# Patient Record
Sex: FEMALE | Race: OTHER | NOT HISPANIC OR LATINO | URBAN - METROPOLITAN AREA
[De-identification: names, ages, dates, MRNs, and addresses within clinical notes are randomized per-mention and may not be internally consistent; named-entity substitution may affect disease eponyms.]

---

## 2017-01-01 ENCOUNTER — INPATIENT (INPATIENT)
Facility: HOSPITAL | Age: 82
LOS: 6 days | Discharge: NURSING, REHAB OR LONG-TERM CARE | DRG: 482 | End: 2017-11-13
Attending: INTERNAL MEDICINE | Admitting: INTERNAL MEDICINE
Payer: MEDICARE

## 2017-01-01 ENCOUNTER — INPATIENT (INPATIENT)
Facility: HOSPITAL | Age: 82
LOS: 12 days | DRG: 870 | End: 2017-12-11
Attending: INTERNAL MEDICINE | Admitting: INTERNAL MEDICINE
Payer: MEDICARE

## 2017-01-01 VITALS
TEMPERATURE: 97 F | HEART RATE: 84 BPM | OXYGEN SATURATION: 99 % | SYSTOLIC BLOOD PRESSURE: 136 MMHG | RESPIRATION RATE: 16 BRPM | DIASTOLIC BLOOD PRESSURE: 61 MMHG

## 2017-01-01 VITALS
DIASTOLIC BLOOD PRESSURE: 73 MMHG | RESPIRATION RATE: 26 BRPM | OXYGEN SATURATION: 94 % | HEART RATE: 130 BPM | SYSTOLIC BLOOD PRESSURE: 126 MMHG | TEMPERATURE: 100 F

## 2017-01-01 VITALS
HEART RATE: 66 BPM | HEIGHT: 63 IN | TEMPERATURE: 98 F | OXYGEN SATURATION: 99 % | RESPIRATION RATE: 18 BRPM | WEIGHT: 130.07 LBS | DIASTOLIC BLOOD PRESSURE: 73 MMHG | SYSTOLIC BLOOD PRESSURE: 182 MMHG

## 2017-01-01 DIAGNOSIS — A41.9 SEPSIS, UNSPECIFIED ORGANISM: ICD-10-CM

## 2017-01-01 DIAGNOSIS — R19.7 DIARRHEA, UNSPECIFIED: ICD-10-CM

## 2017-01-01 DIAGNOSIS — M25.551 PAIN IN RIGHT HIP: ICD-10-CM

## 2017-01-01 DIAGNOSIS — R53.81 OTHER MALAISE: ICD-10-CM

## 2017-01-01 DIAGNOSIS — E05.90 THYROTOXICOSIS, UNSPECIFIED WITHOUT THYROTOXIC CRISIS OR STORM: ICD-10-CM

## 2017-01-01 DIAGNOSIS — Z29.9 ENCOUNTER FOR PROPHYLACTIC MEASURES, UNSPECIFIED: ICD-10-CM

## 2017-01-01 DIAGNOSIS — R94.31 ABNORMAL ELECTROCARDIOGRAM [ECG] [EKG]: ICD-10-CM

## 2017-01-01 DIAGNOSIS — L40.9 PSORIASIS, UNSPECIFIED: ICD-10-CM

## 2017-01-01 DIAGNOSIS — S72.001A FRACTURE OF UNSPECIFIED PART OF NECK OF RIGHT FEMUR, INITIAL ENCOUNTER FOR CLOSED FRACTURE: ICD-10-CM

## 2017-01-01 DIAGNOSIS — F03.90 UNSPECIFIED DEMENTIA WITHOUT BEHAVIORAL DISTURBANCE: ICD-10-CM

## 2017-01-01 DIAGNOSIS — G89.18 OTHER ACUTE POSTPROCEDURAL PAIN: ICD-10-CM

## 2017-01-01 DIAGNOSIS — Z51.5 ENCOUNTER FOR PALLIATIVE CARE: ICD-10-CM

## 2017-01-01 DIAGNOSIS — I50.21 ACUTE SYSTOLIC (CONGESTIVE) HEART FAILURE: ICD-10-CM

## 2017-01-01 DIAGNOSIS — E87.0 HYPEROSMOLALITY AND HYPERNATREMIA: ICD-10-CM

## 2017-01-01 DIAGNOSIS — J96.90 RESPIRATORY FAILURE, UNSPECIFIED, UNSPECIFIED WHETHER WITH HYPOXIA OR HYPERCAPNIA: ICD-10-CM

## 2017-01-01 DIAGNOSIS — G30.1 ALZHEIMER'S DISEASE WITH LATE ONSET: ICD-10-CM

## 2017-01-01 DIAGNOSIS — J96.01 ACUTE RESPIRATORY FAILURE WITH HYPOXIA: ICD-10-CM

## 2017-01-01 DIAGNOSIS — D72.829 ELEVATED WHITE BLOOD CELL COUNT, UNSPECIFIED: ICD-10-CM

## 2017-01-01 LAB
-  AMPICILLIN: SIGNIFICANT CHANGE UP
-  CEFTRIAXONE: SIGNIFICANT CHANGE UP
-  CHLORAMPHENICOL: SIGNIFICANT CHANGE UP
-  LEVOFLOXACIN: SIGNIFICANT CHANGE UP
-  MEROPENEM: SIGNIFICANT CHANGE UP
24R-OH-CALCIDIOL SERPL-MCNC: 14.5 NG/ML — LOW (ref 30–80)
ABO RH CONFIRMATION: SIGNIFICANT CHANGE UP
ALBUMIN SERPL ELPH-MCNC: 1.7 G/DL — LOW (ref 3.5–5)
ALBUMIN SERPL ELPH-MCNC: 2.5 G/DL — LOW (ref 3.5–5)
ALBUMIN SERPL ELPH-MCNC: 2.6 G/DL — LOW (ref 3.5–5)
ALBUMIN SERPL ELPH-MCNC: 2.7 G/DL — LOW (ref 3.5–5)
ALBUMIN SERPL ELPH-MCNC: 3 G/DL — LOW (ref 3.5–5)
ALP SERPL-CCNC: 119 U/L — SIGNIFICANT CHANGE UP (ref 40–120)
ALP SERPL-CCNC: 119 U/L — SIGNIFICANT CHANGE UP (ref 40–120)
ALP SERPL-CCNC: 65 U/L — SIGNIFICANT CHANGE UP (ref 40–120)
ALP SERPL-CCNC: 66 U/L — SIGNIFICANT CHANGE UP (ref 40–120)
ALP SERPL-CCNC: 75 U/L — SIGNIFICANT CHANGE UP (ref 40–120)
ALT FLD-CCNC: 18 U/L DA — SIGNIFICANT CHANGE UP (ref 10–60)
ALT FLD-CCNC: 19 U/L DA — SIGNIFICANT CHANGE UP (ref 10–60)
ALT FLD-CCNC: 20 U/L DA — SIGNIFICANT CHANGE UP (ref 10–60)
ALT FLD-CCNC: 24 U/L DA — SIGNIFICANT CHANGE UP (ref 10–60)
ALT FLD-CCNC: 29 U/L DA — SIGNIFICANT CHANGE UP (ref 10–60)
ANION GAP SERPL CALC-SCNC: 10 MMOL/L — SIGNIFICANT CHANGE UP (ref 5–17)
ANION GAP SERPL CALC-SCNC: 10 MMOL/L — SIGNIFICANT CHANGE UP (ref 5–17)
ANION GAP SERPL CALC-SCNC: 15 MMOL/L — SIGNIFICANT CHANGE UP (ref 5–17)
ANION GAP SERPL CALC-SCNC: 2 MMOL/L — LOW (ref 5–17)
ANION GAP SERPL CALC-SCNC: 3 MMOL/L — LOW (ref 5–17)
ANION GAP SERPL CALC-SCNC: 4 MMOL/L — LOW (ref 5–17)
ANION GAP SERPL CALC-SCNC: 4 MMOL/L — LOW (ref 5–17)
ANION GAP SERPL CALC-SCNC: 5 MMOL/L — SIGNIFICANT CHANGE UP (ref 5–17)
ANION GAP SERPL CALC-SCNC: 6 MMOL/L — SIGNIFICANT CHANGE UP (ref 5–17)
ANION GAP SERPL CALC-SCNC: 7 MMOL/L — SIGNIFICANT CHANGE UP (ref 5–17)
ANION GAP SERPL CALC-SCNC: 8 MMOL/L — SIGNIFICANT CHANGE UP (ref 5–17)
APPEARANCE UR: ABNORMAL
APTT BLD: 30.9 SEC — SIGNIFICANT CHANGE UP (ref 27.5–37.4)
APTT BLD: 41.2 SEC — HIGH (ref 27.5–37.4)
AST SERPL-CCNC: 13 U/L — SIGNIFICANT CHANGE UP (ref 10–40)
AST SERPL-CCNC: 22 U/L — SIGNIFICANT CHANGE UP (ref 10–40)
AST SERPL-CCNC: 24 U/L — SIGNIFICANT CHANGE UP (ref 10–40)
AST SERPL-CCNC: 24 U/L — SIGNIFICANT CHANGE UP (ref 10–40)
AST SERPL-CCNC: 39 U/L — SIGNIFICANT CHANGE UP (ref 10–40)
B PERT IGG+IGM PNL SER: ABNORMAL
BASE EXCESS BLDA CALC-SCNC: -0.3 MMOL/L — SIGNIFICANT CHANGE UP (ref -2–2)
BASE EXCESS BLDA CALC-SCNC: -1.5 MMOL/L — SIGNIFICANT CHANGE UP (ref -2–2)
BASE EXCESS BLDA CALC-SCNC: -2.4 MMOL/L — LOW (ref -2–2)
BASE EXCESS BLDA CALC-SCNC: 0.4 MMOL/L — SIGNIFICANT CHANGE UP (ref -2–2)
BASE EXCESS BLDA CALC-SCNC: 1.7 MMOL/L — SIGNIFICANT CHANGE UP (ref -2–2)
BASE EXCESS BLDA CALC-SCNC: 12.9 MMOL/L — HIGH (ref -2–2)
BASE EXCESS BLDA CALC-SCNC: 13 MMOL/L — HIGH (ref -2–2)
BASE EXCESS BLDA CALC-SCNC: 13.5 MMOL/L — HIGH (ref -2–2)
BASE EXCESS BLDA CALC-SCNC: 4.7 MMOL/L — HIGH (ref -2–2)
BASE EXCESS BLDA CALC-SCNC: 5.8 MMOL/L — HIGH (ref -2–2)
BASE EXCESS BLDA CALC-SCNC: 6 MMOL/L — HIGH (ref -2–2)
BASE EXCESS BLDA CALC-SCNC: 8.3 MMOL/L — HIGH (ref -2–2)
BASE EXCESS BLDA CALC-SCNC: 9.1 MMOL/L — HIGH (ref -2–2)
BASE EXCESS BLDA CALC-SCNC: 9.6 MMOL/L — HIGH (ref -2–2)
BASE EXCESS BLDV CALC-SCNC: -0.9 MMOL/L — SIGNIFICANT CHANGE UP (ref -2–2)
BASOPHILS # BLD AUTO: 0 K/UL — SIGNIFICANT CHANGE UP (ref 0–0.2)
BASOPHILS # BLD AUTO: 0.1 K/UL — SIGNIFICANT CHANGE UP (ref 0–0.2)
BASOPHILS # BLD AUTO: 0.2 K/UL — SIGNIFICANT CHANGE UP (ref 0–0.2)
BASOPHILS NFR BLD AUTO: 0.1 % — SIGNIFICANT CHANGE UP (ref 0–2)
BASOPHILS NFR BLD AUTO: 0.7 % — SIGNIFICANT CHANGE UP (ref 0–2)
BASOPHILS NFR BLD AUTO: 0.7 % — SIGNIFICANT CHANGE UP (ref 0–2)
BASOPHILS NFR BLD AUTO: 1.2 % — SIGNIFICANT CHANGE UP (ref 0–2)
BASOPHILS NFR BLD AUTO: 1.4 % — SIGNIFICANT CHANGE UP (ref 0–2)
BILIRUB DIRECT SERPL-MCNC: 0.3 MG/DL — HIGH (ref 0–0.2)
BILIRUB INDIRECT FLD-MCNC: 1 MG/DL — SIGNIFICANT CHANGE UP (ref 0.2–1)
BILIRUB SERPL-MCNC: 0.3 MG/DL — SIGNIFICANT CHANGE UP (ref 0.2–1.2)
BILIRUB SERPL-MCNC: 0.6 MG/DL — SIGNIFICANT CHANGE UP (ref 0.2–1.2)
BILIRUB SERPL-MCNC: 0.8 MG/DL — SIGNIFICANT CHANGE UP (ref 0.2–1.2)
BILIRUB SERPL-MCNC: 1.3 MG/DL — HIGH (ref 0.2–1.2)
BILIRUB SERPL-MCNC: 1.3 MG/DL — HIGH (ref 0.2–1.2)
BILIRUB UR-MCNC: NEGATIVE — SIGNIFICANT CHANGE UP
BLD GP AB SCN SERPL QL: SIGNIFICANT CHANGE UP
BLOOD GAS COMMENTS ARTERIAL: SIGNIFICANT CHANGE UP
BLOOD GAS COMMENTS, VENOUS: SIGNIFICANT CHANGE UP
BUN SERPL-MCNC: 11 MG/DL — SIGNIFICANT CHANGE UP (ref 7–18)
BUN SERPL-MCNC: 11 MG/DL — SIGNIFICANT CHANGE UP (ref 7–18)
BUN SERPL-MCNC: 12 MG/DL — SIGNIFICANT CHANGE UP (ref 7–18)
BUN SERPL-MCNC: 13 MG/DL — SIGNIFICANT CHANGE UP (ref 7–18)
BUN SERPL-MCNC: 14 MG/DL — SIGNIFICANT CHANGE UP (ref 7–18)
BUN SERPL-MCNC: 16 MG/DL — SIGNIFICANT CHANGE UP (ref 7–18)
BUN SERPL-MCNC: 18 MG/DL — SIGNIFICANT CHANGE UP (ref 7–18)
BUN SERPL-MCNC: 19 MG/DL — HIGH (ref 7–18)
BUN SERPL-MCNC: 22 MG/DL — HIGH (ref 7–18)
BUN SERPL-MCNC: 24 MG/DL — HIGH (ref 7–18)
BUN SERPL-MCNC: 24 MG/DL — HIGH (ref 7–18)
BUN SERPL-MCNC: 25 MG/DL — HIGH (ref 7–18)
BUN SERPL-MCNC: 29 MG/DL — HIGH (ref 7–18)
BUN SERPL-MCNC: 29 MG/DL — HIGH (ref 7–18)
BUN SERPL-MCNC: 31 MG/DL — HIGH (ref 7–18)
BUN SERPL-MCNC: 33 MG/DL — HIGH (ref 7–18)
BUN SERPL-MCNC: 41 MG/DL — HIGH (ref 7–18)
BUN SERPL-MCNC: 45 MG/DL — HIGH (ref 7–18)
BUN SERPL-MCNC: 7 MG/DL — SIGNIFICANT CHANGE UP (ref 7–18)
BUN SERPL-MCNC: 9 MG/DL — SIGNIFICANT CHANGE UP (ref 7–18)
C DIFF BY PCR RESULT: DETECTED
C DIFF TOX GENS STL QL NAA+PROBE: SIGNIFICANT CHANGE UP
CALCIUM SERPL-MCNC: 6.2 MG/DL — CRITICAL LOW (ref 8.4–10.5)
CALCIUM SERPL-MCNC: 7.3 MG/DL — LOW (ref 8.4–10.5)
CALCIUM SERPL-MCNC: 7.8 MG/DL — LOW (ref 8.4–10.5)
CALCIUM SERPL-MCNC: 7.8 MG/DL — LOW (ref 8.4–10.5)
CALCIUM SERPL-MCNC: 7.9 MG/DL — LOW (ref 8.4–10.5)
CALCIUM SERPL-MCNC: 8 MG/DL — LOW (ref 8.4–10.5)
CALCIUM SERPL-MCNC: 8.1 MG/DL — LOW (ref 8.4–10.5)
CALCIUM SERPL-MCNC: 8.1 MG/DL — LOW (ref 8.4–10.5)
CALCIUM SERPL-MCNC: 8.2 MG/DL — LOW (ref 8.4–10.5)
CALCIUM SERPL-MCNC: 8.3 MG/DL — LOW (ref 8.4–10.5)
CALCIUM SERPL-MCNC: 8.3 MG/DL — LOW (ref 8.4–10.5)
CALCIUM SERPL-MCNC: 8.5 MG/DL — SIGNIFICANT CHANGE UP (ref 8.4–10.5)
CALCIUM SERPL-MCNC: 8.6 MG/DL — SIGNIFICANT CHANGE UP (ref 8.4–10.5)
CALCIUM SERPL-MCNC: 8.6 MG/DL — SIGNIFICANT CHANGE UP (ref 8.4–10.5)
CALCIUM SERPL-MCNC: 8.7 MG/DL — SIGNIFICANT CHANGE UP (ref 8.4–10.5)
CALCIUM SERPL-MCNC: 9.5 MG/DL — SIGNIFICANT CHANGE UP (ref 8.4–10.5)
CHLORIDE SERPL-SCNC: 103 MMOL/L — SIGNIFICANT CHANGE UP (ref 96–108)
CHLORIDE SERPL-SCNC: 103 MMOL/L — SIGNIFICANT CHANGE UP (ref 96–108)
CHLORIDE SERPL-SCNC: 104 MMOL/L — SIGNIFICANT CHANGE UP (ref 96–108)
CHLORIDE SERPL-SCNC: 104 MMOL/L — SIGNIFICANT CHANGE UP (ref 96–108)
CHLORIDE SERPL-SCNC: 106 MMOL/L — SIGNIFICANT CHANGE UP (ref 96–108)
CHLORIDE SERPL-SCNC: 106 MMOL/L — SIGNIFICANT CHANGE UP (ref 96–108)
CHLORIDE SERPL-SCNC: 107 MMOL/L — SIGNIFICANT CHANGE UP (ref 96–108)
CHLORIDE SERPL-SCNC: 108 MMOL/L — SIGNIFICANT CHANGE UP (ref 96–108)
CHLORIDE SERPL-SCNC: 109 MMOL/L — HIGH (ref 96–108)
CHLORIDE SERPL-SCNC: 112 MMOL/L — HIGH (ref 96–108)
CHLORIDE SERPL-SCNC: 113 MMOL/L — HIGH (ref 96–108)
CHLORIDE SERPL-SCNC: 114 MMOL/L — HIGH (ref 96–108)
CHLORIDE SERPL-SCNC: 115 MMOL/L — HIGH (ref 96–108)
CHLORIDE SERPL-SCNC: 116 MMOL/L — HIGH (ref 96–108)
CHLORIDE SERPL-SCNC: 118 MMOL/L — HIGH (ref 96–108)
CHOLEST SERPL-MCNC: 133 MG/DL — SIGNIFICANT CHANGE UP (ref 10–199)
CK MB BLD-MCNC: 1.9 % — SIGNIFICANT CHANGE UP (ref 0–3.5)
CK MB BLD-MCNC: 12.1 % — HIGH (ref 0–3.5)
CK MB BLD-MCNC: 2.1 % — SIGNIFICANT CHANGE UP (ref 0–3.5)
CK MB BLD-MCNC: 5.7 % — HIGH (ref 0–3.5)
CK MB CFR SERPL CALC: 3.6 NG/ML — SIGNIFICANT CHANGE UP (ref 0–3.6)
CK MB CFR SERPL CALC: 3.6 NG/ML — SIGNIFICANT CHANGE UP (ref 0–3.6)
CK MB CFR SERPL CALC: 4.7 NG/ML — HIGH (ref 0–3.6)
CK MB CFR SERPL CALC: 5.2 NG/ML — HIGH (ref 0–3.6)
CK SERPL-CCNC: 175 U/L — SIGNIFICANT CHANGE UP (ref 21–215)
CK SERPL-CCNC: 187 U/L — SIGNIFICANT CHANGE UP (ref 21–215)
CK SERPL-CCNC: 39 U/L — SIGNIFICANT CHANGE UP (ref 21–215)
CK SERPL-CCNC: 91 U/L — SIGNIFICANT CHANGE UP (ref 21–215)
CO2 SERPL-SCNC: 15 MMOL/L — LOW (ref 22–31)
CO2 SERPL-SCNC: 25 MMOL/L — SIGNIFICANT CHANGE UP (ref 22–31)
CO2 SERPL-SCNC: 25 MMOL/L — SIGNIFICANT CHANGE UP (ref 22–31)
CO2 SERPL-SCNC: 26 MMOL/L — SIGNIFICANT CHANGE UP (ref 22–31)
CO2 SERPL-SCNC: 26 MMOL/L — SIGNIFICANT CHANGE UP (ref 22–31)
CO2 SERPL-SCNC: 27 MMOL/L — SIGNIFICANT CHANGE UP (ref 22–31)
CO2 SERPL-SCNC: 27 MMOL/L — SIGNIFICANT CHANGE UP (ref 22–31)
CO2 SERPL-SCNC: 28 MMOL/L — SIGNIFICANT CHANGE UP (ref 22–31)
CO2 SERPL-SCNC: 29 MMOL/L — SIGNIFICANT CHANGE UP (ref 22–31)
CO2 SERPL-SCNC: 30 MMOL/L — SIGNIFICANT CHANGE UP (ref 22–31)
CO2 SERPL-SCNC: 30 MMOL/L — SIGNIFICANT CHANGE UP (ref 22–31)
CO2 SERPL-SCNC: 31 MMOL/L — SIGNIFICANT CHANGE UP (ref 22–31)
CO2 SERPL-SCNC: 31 MMOL/L — SIGNIFICANT CHANGE UP (ref 22–31)
CO2 SERPL-SCNC: 33 MMOL/L — HIGH (ref 22–31)
CO2 SERPL-SCNC: 34 MMOL/L — HIGH (ref 22–31)
CO2 SERPL-SCNC: 34 MMOL/L — HIGH (ref 22–31)
CO2 SERPL-SCNC: 35 MMOL/L — HIGH (ref 22–31)
CO2 SERPL-SCNC: 36 MMOL/L — HIGH (ref 22–31)
CO2 SERPL-SCNC: 37 MMOL/L — HIGH (ref 22–31)
COLOR FLD: YELLOW — SIGNIFICANT CHANGE UP
COLOR SPEC: YELLOW — SIGNIFICANT CHANGE UP
CREAT SERPL-MCNC: 0.2 MG/DL — LOW (ref 0.5–1.3)
CREAT SERPL-MCNC: 0.2 MG/DL — LOW (ref 0.5–1.3)
CREAT SERPL-MCNC: 0.22 MG/DL — LOW (ref 0.5–1.3)
CREAT SERPL-MCNC: 0.22 MG/DL — LOW (ref 0.5–1.3)
CREAT SERPL-MCNC: 0.24 MG/DL — LOW (ref 0.5–1.3)
CREAT SERPL-MCNC: 0.28 MG/DL — LOW (ref 0.5–1.3)
CREAT SERPL-MCNC: 0.29 MG/DL — LOW (ref 0.5–1.3)
CREAT SERPL-MCNC: 0.3 MG/DL — LOW (ref 0.5–1.3)
CREAT SERPL-MCNC: 0.3 MG/DL — LOW (ref 0.5–1.3)
CREAT SERPL-MCNC: 0.31 MG/DL — LOW (ref 0.5–1.3)
CREAT SERPL-MCNC: 0.32 MG/DL — LOW (ref 0.5–1.3)
CREAT SERPL-MCNC: 0.33 MG/DL — LOW (ref 0.5–1.3)
CREAT SERPL-MCNC: 0.34 MG/DL — LOW (ref 0.5–1.3)
CREAT SERPL-MCNC: 0.36 MG/DL — LOW (ref 0.5–1.3)
CREAT SERPL-MCNC: 0.44 MG/DL — LOW (ref 0.5–1.3)
CREAT SERPL-MCNC: 0.44 MG/DL — LOW (ref 0.5–1.3)
CREAT SERPL-MCNC: 0.46 MG/DL — LOW (ref 0.5–1.3)
CREAT SERPL-MCNC: 0.58 MG/DL — SIGNIFICANT CHANGE UP (ref 0.5–1.3)
CREAT SERPL-MCNC: 0.71 MG/DL — SIGNIFICANT CHANGE UP (ref 0.5–1.3)
CREAT SERPL-MCNC: <0.2 MG/DL — LOW (ref 0.5–1.3)
CREAT SERPL-MCNC: <0.2 MG/DL — LOW (ref 0.5–1.3)
CRP SERPL-MCNC: 7.9 MG/DL — HIGH (ref 0–0.4)
CULTURE RESULTS: NO GROWTH — SIGNIFICANT CHANGE UP
CULTURE RESULTS: NO GROWTH — SIGNIFICANT CHANGE UP
CULTURE RESULTS: SIGNIFICANT CHANGE UP
DIFF PNL FLD: ABNORMAL
EOSINOPHIL # BLD AUTO: 0 K/UL — SIGNIFICANT CHANGE UP (ref 0–0.5)
EOSINOPHIL # BLD AUTO: 0 K/UL — SIGNIFICANT CHANGE UP (ref 0–0.5)
EOSINOPHIL # BLD AUTO: 0.1 K/UL — SIGNIFICANT CHANGE UP (ref 0–0.5)
EOSINOPHIL # BLD AUTO: 0.1 K/UL — SIGNIFICANT CHANGE UP (ref 0–0.5)
EOSINOPHIL # BLD AUTO: 0.2 K/UL — SIGNIFICANT CHANGE UP (ref 0–0.5)
EOSINOPHIL NFR BLD AUTO: 0 % — SIGNIFICANT CHANGE UP (ref 0–6)
EOSINOPHIL NFR BLD AUTO: 0 % — SIGNIFICANT CHANGE UP (ref 0–6)
EOSINOPHIL NFR BLD AUTO: 0.7 % — SIGNIFICANT CHANGE UP (ref 0–6)
EOSINOPHIL NFR BLD AUTO: 1 % — SIGNIFICANT CHANGE UP (ref 0–6)
EOSINOPHIL NFR BLD AUTO: 1.6 % — SIGNIFICANT CHANGE UP (ref 0–6)
ERYTHROCYTE [SEDIMENTATION RATE] IN BLOOD: 12 MM/HR — SIGNIFICANT CHANGE UP (ref 0–20)
FLUID INTAKE SUBSTANCE CLASS: SIGNIFICANT CHANGE UP
FLUID SEGMENTED GRANULOCYTES: 78 % — SIGNIFICANT CHANGE UP
FOLATE SERPL-MCNC: 6.1 NG/ML — SIGNIFICANT CHANGE UP (ref 4.8–24.2)
GLUCOSE BLDC GLUCOMTR-MCNC: 101 MG/DL — HIGH (ref 70–99)
GLUCOSE BLDC GLUCOMTR-MCNC: 117 MG/DL — HIGH (ref 70–99)
GLUCOSE BLDC GLUCOMTR-MCNC: 67 MG/DL — LOW (ref 70–99)
GLUCOSE BLDC GLUCOMTR-MCNC: 98 MG/DL — SIGNIFICANT CHANGE UP (ref 70–99)
GLUCOSE FLD-MCNC: 86 MG/DL — SIGNIFICANT CHANGE UP
GLUCOSE SERPL-MCNC: 109 MG/DL — HIGH (ref 70–99)
GLUCOSE SERPL-MCNC: 111 MG/DL — HIGH (ref 70–99)
GLUCOSE SERPL-MCNC: 118 MG/DL — HIGH (ref 70–99)
GLUCOSE SERPL-MCNC: 118 MG/DL — HIGH (ref 70–99)
GLUCOSE SERPL-MCNC: 123 MG/DL — HIGH (ref 70–99)
GLUCOSE SERPL-MCNC: 123 MG/DL — HIGH (ref 70–99)
GLUCOSE SERPL-MCNC: 135 MG/DL — HIGH (ref 70–99)
GLUCOSE SERPL-MCNC: 139 MG/DL — HIGH (ref 70–99)
GLUCOSE SERPL-MCNC: 144 MG/DL — HIGH (ref 70–99)
GLUCOSE SERPL-MCNC: 148 MG/DL — HIGH (ref 70–99)
GLUCOSE SERPL-MCNC: 185 MG/DL — HIGH (ref 70–99)
GLUCOSE SERPL-MCNC: 79 MG/DL — SIGNIFICANT CHANGE UP (ref 70–99)
GLUCOSE SERPL-MCNC: 80 MG/DL — SIGNIFICANT CHANGE UP (ref 70–99)
GLUCOSE SERPL-MCNC: 82 MG/DL — SIGNIFICANT CHANGE UP (ref 70–99)
GLUCOSE SERPL-MCNC: 85 MG/DL — SIGNIFICANT CHANGE UP (ref 70–99)
GLUCOSE SERPL-MCNC: 87 MG/DL — SIGNIFICANT CHANGE UP (ref 70–99)
GLUCOSE SERPL-MCNC: 88 MG/DL — SIGNIFICANT CHANGE UP (ref 70–99)
GLUCOSE SERPL-MCNC: 90 MG/DL — SIGNIFICANT CHANGE UP (ref 70–99)
GLUCOSE SERPL-MCNC: 91 MG/DL — SIGNIFICANT CHANGE UP (ref 70–99)
GLUCOSE SERPL-MCNC: 93 MG/DL — SIGNIFICANT CHANGE UP (ref 70–99)
GLUCOSE SERPL-MCNC: 94 MG/DL — SIGNIFICANT CHANGE UP (ref 70–99)
GLUCOSE SERPL-MCNC: 94 MG/DL — SIGNIFICANT CHANGE UP (ref 70–99)
GLUCOSE SERPL-MCNC: 98 MG/DL — SIGNIFICANT CHANGE UP (ref 70–99)
GLUCOSE SERPL-MCNC: 99 MG/DL — SIGNIFICANT CHANGE UP (ref 70–99)
GLUCOSE SERPL-MCNC: 99 MG/DL — SIGNIFICANT CHANGE UP (ref 70–99)
GLUCOSE UR QL: NEGATIVE — SIGNIFICANT CHANGE UP
GRAM STN FLD: SIGNIFICANT CHANGE UP
HAEM INFLU DNA BLD POS QL NAA+NON-PROBE: SIGNIFICANT CHANGE UP
HBA1C BLD-MCNC: 4.8 % — SIGNIFICANT CHANGE UP (ref 4–5.6)
HBA1C BLD-MCNC: 5.3 % — SIGNIFICANT CHANGE UP (ref 4–5.6)
HCO3 BLDA-SCNC: 24 MMOL/L — SIGNIFICANT CHANGE UP (ref 23–27)
HCO3 BLDA-SCNC: 24 MMOL/L — SIGNIFICANT CHANGE UP (ref 23–27)
HCO3 BLDA-SCNC: 26 MMOL/L — SIGNIFICANT CHANGE UP (ref 23–27)
HCO3 BLDA-SCNC: 27 MMOL/L — SIGNIFICANT CHANGE UP (ref 23–27)
HCO3 BLDA-SCNC: 27 MMOL/L — SIGNIFICANT CHANGE UP (ref 23–27)
HCO3 BLDA-SCNC: 29 MMOL/L — HIGH (ref 23–27)
HCO3 BLDA-SCNC: 29 MMOL/L — HIGH (ref 23–27)
HCO3 BLDA-SCNC: 30 MMOL/L — HIGH (ref 23–27)
HCO3 BLDA-SCNC: 32 MMOL/L — HIGH (ref 23–27)
HCO3 BLDA-SCNC: 34 MMOL/L — HIGH (ref 23–27)
HCO3 BLDA-SCNC: 34 MMOL/L — HIGH (ref 23–27)
HCO3 BLDA-SCNC: 37 MMOL/L — HIGH (ref 23–27)
HCO3 BLDA-SCNC: 38 MMOL/L — HIGH (ref 23–27)
HCO3 BLDA-SCNC: 38 MMOL/L — HIGH (ref 23–27)
HCO3 BLDV-SCNC: 24 MMOL/L — SIGNIFICANT CHANGE UP (ref 21–29)
HCT VFR BLD CALC: 25.4 % — LOW (ref 34.5–45)
HCT VFR BLD CALC: 29.3 % — LOW (ref 34.5–45)
HCT VFR BLD CALC: 30.3 % — LOW (ref 34.5–45)
HCT VFR BLD CALC: 30.9 % — LOW (ref 34.5–45)
HCT VFR BLD CALC: 31 % — LOW (ref 34.5–45)
HCT VFR BLD CALC: 31.4 % — LOW (ref 34.5–45)
HCT VFR BLD CALC: 31.6 % — LOW (ref 34.5–45)
HCT VFR BLD CALC: 31.6 % — LOW (ref 34.5–45)
HCT VFR BLD CALC: 31.9 % — LOW (ref 34.5–45)
HCT VFR BLD CALC: 32.5 % — LOW (ref 34.5–45)
HCT VFR BLD CALC: 33 % — LOW (ref 34.5–45)
HCT VFR BLD CALC: 34.3 % — LOW (ref 34.5–45)
HCT VFR BLD CALC: 34.8 % — SIGNIFICANT CHANGE UP (ref 34.5–45)
HCT VFR BLD CALC: 35.6 % — SIGNIFICANT CHANGE UP (ref 34.5–45)
HCT VFR BLD CALC: 36.8 % — SIGNIFICANT CHANGE UP (ref 34.5–45)
HCT VFR BLD CALC: 37.2 % — SIGNIFICANT CHANGE UP (ref 34.5–45)
HCT VFR BLD CALC: 37.6 % — SIGNIFICANT CHANGE UP (ref 34.5–45)
HCT VFR BLD CALC: 38.3 % — SIGNIFICANT CHANGE UP (ref 34.5–45)
HCT VFR BLD CALC: 38.6 % — SIGNIFICANT CHANGE UP (ref 34.5–45)
HCT VFR BLD CALC: 43.1 % — SIGNIFICANT CHANGE UP (ref 34.5–45)
HCT VFR BLD CALC: 46.8 % — HIGH (ref 34.5–45)
HDLC SERPL-MCNC: 58 MG/DL — SIGNIFICANT CHANGE UP (ref 40–125)
HGB BLD-MCNC: 10 G/DL — LOW (ref 11.5–15.5)
HGB BLD-MCNC: 10.1 G/DL — LOW (ref 11.5–15.5)
HGB BLD-MCNC: 10.5 G/DL — LOW (ref 11.5–15.5)
HGB BLD-MCNC: 10.6 G/DL — LOW (ref 11.5–15.5)
HGB BLD-MCNC: 10.8 G/DL — LOW (ref 11.5–15.5)
HGB BLD-MCNC: 11.3 G/DL — LOW (ref 11.5–15.5)
HGB BLD-MCNC: 11.4 G/DL — LOW (ref 11.5–15.5)
HGB BLD-MCNC: 11.6 G/DL — SIGNIFICANT CHANGE UP (ref 11.5–15.5)
HGB BLD-MCNC: 11.6 G/DL — SIGNIFICANT CHANGE UP (ref 11.5–15.5)
HGB BLD-MCNC: 11.8 G/DL — SIGNIFICANT CHANGE UP (ref 11.5–15.5)
HGB BLD-MCNC: 12.2 G/DL — SIGNIFICANT CHANGE UP (ref 11.5–15.5)
HGB BLD-MCNC: 13.5 G/DL — SIGNIFICANT CHANGE UP (ref 11.5–15.5)
HGB BLD-MCNC: 13.9 G/DL — SIGNIFICANT CHANGE UP (ref 11.5–15.5)
HGB BLD-MCNC: 8.5 G/DL — LOW (ref 11.5–15.5)
HGB BLD-MCNC: 9.1 G/DL — LOW (ref 11.5–15.5)
HGB BLD-MCNC: 9.6 G/DL — LOW (ref 11.5–15.5)
HGB BLD-MCNC: 9.7 G/DL — LOW (ref 11.5–15.5)
HGB BLD-MCNC: 9.9 G/DL — LOW (ref 11.5–15.5)
HOROWITZ INDEX BLDA+IHG-RTO: 100 — SIGNIFICANT CHANGE UP
HOROWITZ INDEX BLDA+IHG-RTO: 40 — SIGNIFICANT CHANGE UP
HOROWITZ INDEX BLDA+IHG-RTO: 50 — SIGNIFICANT CHANGE UP
HOROWITZ INDEX BLDA+IHG-RTO: 50 — SIGNIFICANT CHANGE UP
HOROWITZ INDEX BLDA+IHG-RTO: 60 — SIGNIFICANT CHANGE UP
HOROWITZ INDEX BLDA+IHG-RTO: SIGNIFICANT CHANGE UP
HOROWITZ INDEX BLDV+IHG-RTO: 50 — SIGNIFICANT CHANGE UP
INR BLD: 1.04 RATIO — SIGNIFICANT CHANGE UP (ref 0.88–1.16)
INR BLD: 1.61 RATIO — HIGH (ref 0.88–1.16)
KETONES UR-MCNC: ABNORMAL
LACTATE SERPL-SCNC: 1.7 MMOL/L — SIGNIFICANT CHANGE UP (ref 0.7–2)
LACTATE SERPL-SCNC: 2.4 MMOL/L — HIGH (ref 0.7–2)
LACTATE SERPL-SCNC: 2.5 MMOL/L — HIGH (ref 0.7–2)
LACTATE SERPL-SCNC: 3.2 MMOL/L — HIGH (ref 0.7–2)
LACTATE SERPL-SCNC: 3.6 MMOL/L — HIGH (ref 0.7–2)
LACTATE SERPL-SCNC: 7.7 MMOL/L — CRITICAL HIGH (ref 0.7–2)
LDH SERPL L TO P-CCNC: 271 U/L — SIGNIFICANT CHANGE UP
LEGIONELLA AG UR QL: NEGATIVE — SIGNIFICANT CHANGE UP
LEUKOCYTE ESTERASE UR-ACNC: NEGATIVE — SIGNIFICANT CHANGE UP
LIDOCAIN IGE QN: 130 U/L — SIGNIFICANT CHANGE UP (ref 73–393)
LIPID PNL WITH DIRECT LDL SERPL: 62 MG/DL — SIGNIFICANT CHANGE UP
LYMPHOCYTES # BLD AUTO: 0.4 K/UL — LOW (ref 1–3.3)
LYMPHOCYTES # BLD AUTO: 0.6 K/UL — LOW (ref 1–3.3)
LYMPHOCYTES # BLD AUTO: 1.4 K/UL — SIGNIFICANT CHANGE UP (ref 1–3.3)
LYMPHOCYTES # BLD AUTO: 1.5 K/UL — SIGNIFICANT CHANGE UP (ref 1–3.3)
LYMPHOCYTES # BLD AUTO: 1.6 K/UL — SIGNIFICANT CHANGE UP (ref 1–3.3)
LYMPHOCYTES # BLD AUTO: 15.8 % — SIGNIFICANT CHANGE UP (ref 13–44)
LYMPHOCYTES # BLD AUTO: 3 % — LOW (ref 13–44)
LYMPHOCYTES # BLD AUTO: 4 % — LOW (ref 13–44)
LYMPHOCYTES # BLD AUTO: 4.6 % — LOW (ref 13–44)
LYMPHOCYTES # BLD AUTO: 5.3 % — LOW (ref 13–44)
LYMPHOCYTES # BLD AUTO: 7 % — LOW (ref 13–44)
LYMPHOCYTES # BLD AUTO: 9.7 % — LOW (ref 13–44)
LYMPHOCYTES # FLD: 15 % — SIGNIFICANT CHANGE UP
MAGNESIUM SERPL-MCNC: 1.2 MG/DL — LOW (ref 1.6–2.6)
MAGNESIUM SERPL-MCNC: 1.8 MG/DL — SIGNIFICANT CHANGE UP (ref 1.6–2.6)
MAGNESIUM SERPL-MCNC: 1.8 MG/DL — SIGNIFICANT CHANGE UP (ref 1.6–2.6)
MAGNESIUM SERPL-MCNC: 1.9 MG/DL — SIGNIFICANT CHANGE UP (ref 1.6–2.6)
MAGNESIUM SERPL-MCNC: 2 MG/DL — SIGNIFICANT CHANGE UP (ref 1.6–2.6)
MAGNESIUM SERPL-MCNC: 2.2 MG/DL — SIGNIFICANT CHANGE UP (ref 1.6–2.6)
MAGNESIUM SERPL-MCNC: 2.3 MG/DL — SIGNIFICANT CHANGE UP (ref 1.6–2.6)
MAGNESIUM SERPL-MCNC: 2.3 MG/DL — SIGNIFICANT CHANGE UP (ref 1.6–2.6)
MAGNESIUM SERPL-MCNC: 3.2 MG/DL — HIGH (ref 1.6–2.6)
MCHC RBC-ENTMCNC: 29.6 GM/DL — LOW (ref 32–36)
MCHC RBC-ENTMCNC: 30 PG — SIGNIFICANT CHANGE UP (ref 27–34)
MCHC RBC-ENTMCNC: 30.1 GM/DL — LOW (ref 32–36)
MCHC RBC-ENTMCNC: 30.2 GM/DL — LOW (ref 32–36)
MCHC RBC-ENTMCNC: 30.4 GM/DL — LOW (ref 32–36)
MCHC RBC-ENTMCNC: 30.5 PG — SIGNIFICANT CHANGE UP (ref 27–34)
MCHC RBC-ENTMCNC: 30.5 PG — SIGNIFICANT CHANGE UP (ref 27–34)
MCHC RBC-ENTMCNC: 30.6 GM/DL — LOW (ref 32–36)
MCHC RBC-ENTMCNC: 30.7 GM/DL — LOW (ref 32–36)
MCHC RBC-ENTMCNC: 30.7 PG — SIGNIFICANT CHANGE UP (ref 27–34)
MCHC RBC-ENTMCNC: 30.7 PG — SIGNIFICANT CHANGE UP (ref 27–34)
MCHC RBC-ENTMCNC: 30.8 PG — SIGNIFICANT CHANGE UP (ref 27–34)
MCHC RBC-ENTMCNC: 30.8 PG — SIGNIFICANT CHANGE UP (ref 27–34)
MCHC RBC-ENTMCNC: 30.9 GM/DL — LOW (ref 32–36)
MCHC RBC-ENTMCNC: 31 GM/DL — LOW (ref 32–36)
MCHC RBC-ENTMCNC: 31 PG — SIGNIFICANT CHANGE UP (ref 27–34)
MCHC RBC-ENTMCNC: 31 PG — SIGNIFICANT CHANGE UP (ref 27–34)
MCHC RBC-ENTMCNC: 31.2 GM/DL — LOW (ref 32–36)
MCHC RBC-ENTMCNC: 31.2 GM/DL — LOW (ref 32–36)
MCHC RBC-ENTMCNC: 31.2 PG — SIGNIFICANT CHANGE UP (ref 27–34)
MCHC RBC-ENTMCNC: 31.2 PG — SIGNIFICANT CHANGE UP (ref 27–34)
MCHC RBC-ENTMCNC: 31.3 PG — SIGNIFICANT CHANGE UP (ref 27–34)
MCHC RBC-ENTMCNC: 31.4 GM/DL — LOW (ref 32–36)
MCHC RBC-ENTMCNC: 31.4 PG — SIGNIFICANT CHANGE UP (ref 27–34)
MCHC RBC-ENTMCNC: 31.5 GM/DL — LOW (ref 32–36)
MCHC RBC-ENTMCNC: 31.5 PG — SIGNIFICANT CHANGE UP (ref 27–34)
MCHC RBC-ENTMCNC: 31.6 PG — SIGNIFICANT CHANGE UP (ref 27–34)
MCHC RBC-ENTMCNC: 31.7 GM/DL — LOW (ref 32–36)
MCHC RBC-ENTMCNC: 31.7 PG — SIGNIFICANT CHANGE UP (ref 27–34)
MCHC RBC-ENTMCNC: 31.8 GM/DL — LOW (ref 32–36)
MCHC RBC-ENTMCNC: 31.8 PG — SIGNIFICANT CHANGE UP (ref 27–34)
MCHC RBC-ENTMCNC: 32.8 GM/DL — SIGNIFICANT CHANGE UP (ref 32–36)
MCHC RBC-ENTMCNC: 32.8 PG — SIGNIFICANT CHANGE UP (ref 27–34)
MCHC RBC-ENTMCNC: 33 GM/DL — SIGNIFICANT CHANGE UP (ref 32–36)
MCHC RBC-ENTMCNC: 33.4 GM/DL — SIGNIFICANT CHANGE UP (ref 32–36)
MCHC RBC-ENTMCNC: 35.1 PG — HIGH (ref 27–34)
MCV RBC AUTO: 100 FL — SIGNIFICANT CHANGE UP (ref 80–100)
MCV RBC AUTO: 100.5 FL — HIGH (ref 80–100)
MCV RBC AUTO: 100.6 FL — HIGH (ref 80–100)
MCV RBC AUTO: 100.7 FL — HIGH (ref 80–100)
MCV RBC AUTO: 101.8 FL — HIGH (ref 80–100)
MCV RBC AUTO: 102 FL — HIGH (ref 80–100)
MCV RBC AUTO: 103.1 FL — HIGH (ref 80–100)
MCV RBC AUTO: 103.4 FL — HIGH (ref 80–100)
MCV RBC AUTO: 103.9 FL — HIGH (ref 80–100)
MCV RBC AUTO: 104.1 FL — HIGH (ref 80–100)
MCV RBC AUTO: 104.3 FL — HIGH (ref 80–100)
MCV RBC AUTO: 105.3 FL — HIGH (ref 80–100)
MCV RBC AUTO: 94.6 FL — SIGNIFICANT CHANGE UP (ref 80–100)
MCV RBC AUTO: 95.6 FL — SIGNIFICANT CHANGE UP (ref 80–100)
MCV RBC AUTO: 95.9 FL — SIGNIFICANT CHANGE UP (ref 80–100)
MCV RBC AUTO: 97.5 FL — SIGNIFICANT CHANGE UP (ref 80–100)
MCV RBC AUTO: 98.6 FL — SIGNIFICANT CHANGE UP (ref 80–100)
MCV RBC AUTO: 99.2 FL — SIGNIFICANT CHANGE UP (ref 80–100)
MCV RBC AUTO: 99.4 FL — SIGNIFICANT CHANGE UP (ref 80–100)
MCV RBC AUTO: 99.5 FL — SIGNIFICANT CHANGE UP (ref 80–100)
MCV RBC AUTO: 99.6 FL — SIGNIFICANT CHANGE UP (ref 80–100)
METHOD TYPE: SIGNIFICANT CHANGE UP
METHOD TYPE: SIGNIFICANT CHANGE UP
MONOCYTES # BLD AUTO: 0.6 K/UL — SIGNIFICANT CHANGE UP (ref 0–0.9)
MONOCYTES # BLD AUTO: 0.8 K/UL — SIGNIFICANT CHANGE UP (ref 0–0.9)
MONOCYTES # BLD AUTO: 1.4 K/UL — HIGH (ref 0–0.9)
MONOCYTES # BLD AUTO: 1.5 K/UL — HIGH (ref 0–0.9)
MONOCYTES # BLD AUTO: 1.5 K/UL — HIGH (ref 0–0.9)
MONOCYTES NFR BLD AUTO: 1 % — LOW (ref 2–14)
MONOCYTES NFR BLD AUTO: 10.4 % — SIGNIFICANT CHANGE UP (ref 2–14)
MONOCYTES NFR BLD AUTO: 10.5 % — SIGNIFICANT CHANGE UP (ref 2–14)
MONOCYTES NFR BLD AUTO: 3 % — SIGNIFICANT CHANGE UP (ref 2–14)
MONOCYTES NFR BLD AUTO: 6.8 % — SIGNIFICANT CHANGE UP (ref 2–14)
MONOCYTES NFR BLD AUTO: 7 % — SIGNIFICANT CHANGE UP (ref 2–14)
MONOCYTES NFR BLD AUTO: 9.6 % — SIGNIFICANT CHANGE UP (ref 2–14)
MONOS+MACROS # FLD: 7 % — SIGNIFICANT CHANGE UP
NEUTROPHILS # BLD AUTO: 11.3 K/UL — HIGH (ref 1.8–7.4)
NEUTROPHILS # BLD AUTO: 12.7 K/UL — HIGH (ref 1.8–7.4)
NEUTROPHILS # BLD AUTO: 17.7 K/UL — HIGH (ref 1.8–7.4)
NEUTROPHILS # BLD AUTO: 6.4 K/UL — SIGNIFICANT CHANGE UP (ref 1.8–7.4)
NEUTROPHILS # BLD AUTO: 6.9 K/UL — SIGNIFICANT CHANGE UP (ref 1.8–7.4)
NEUTROPHILS NFR BLD AUTO: 75.2 % — SIGNIFICANT CHANGE UP (ref 43–77)
NEUTROPHILS NFR BLD AUTO: 79.1 % — HIGH (ref 43–77)
NEUTROPHILS NFR BLD AUTO: 82.8 % — HIGH (ref 43–77)
NEUTROPHILS NFR BLD AUTO: 83.7 % — HIGH (ref 43–77)
NEUTROPHILS NFR BLD AUTO: 85.2 % — HIGH (ref 43–77)
NEUTROPHILS NFR BLD AUTO: 87 % — HIGH (ref 43–77)
NEUTROPHILS NFR BLD AUTO: 93 % — HIGH (ref 43–77)
NITRITE UR-MCNC: NEGATIVE — SIGNIFICANT CHANGE UP
ORGANISM # SPEC MICROSCOPIC CNT: SIGNIFICANT CHANGE UP
PCO2 BLDA: 39 MMHG — SIGNIFICANT CHANGE UP (ref 32–46)
PCO2 BLDA: 40 MMHG — SIGNIFICANT CHANGE UP (ref 32–46)
PCO2 BLDA: 41 MMHG — SIGNIFICANT CHANGE UP (ref 32–46)
PCO2 BLDA: 43 MMHG — SIGNIFICANT CHANGE UP (ref 32–46)
PCO2 BLDA: 44 MMHG — SIGNIFICANT CHANGE UP (ref 32–46)
PCO2 BLDA: 45 MMHG — SIGNIFICANT CHANGE UP (ref 32–46)
PCO2 BLDA: 46 MMHG — SIGNIFICANT CHANGE UP (ref 32–46)
PCO2 BLDA: 47 MMHG — HIGH (ref 32–46)
PCO2 BLDA: 50 MMHG — HIGH (ref 32–46)
PCO2 BLDA: 51 MMHG — HIGH (ref 32–46)
PCO2 BLDA: 52 MMHG — HIGH (ref 32–46)
PCO2 BLDA: 53 MMHG — HIGH (ref 32–46)
PCO2 BLDA: 53 MMHG — HIGH (ref 32–46)
PCO2 BLDA: 66 MMHG — HIGH (ref 32–46)
PCO2 BLDV: 45 MMHG — SIGNIFICANT CHANGE UP (ref 35–50)
PH BLDA: 7.22 — LOW (ref 7.35–7.45)
PH BLDA: 7.32 — LOW (ref 7.35–7.45)
PH BLDA: 7.34 — LOW (ref 7.35–7.45)
PH BLDA: 7.37 — SIGNIFICANT CHANGE UP (ref 7.35–7.45)
PH BLDA: 7.38 — SIGNIFICANT CHANGE UP (ref 7.35–7.45)
PH BLDA: 7.43 — SIGNIFICANT CHANGE UP (ref 7.35–7.45)
PH BLDA: 7.43 — SIGNIFICANT CHANGE UP (ref 7.35–7.45)
PH BLDA: 7.45 — SIGNIFICANT CHANGE UP (ref 7.35–7.45)
PH BLDA: 7.48 — HIGH (ref 7.35–7.45)
PH BLDA: 7.49 — HIGH (ref 7.35–7.45)
PH BLDA: 7.51 — HIGH (ref 7.35–7.45)
PH BLDA: 7.52 — HIGH (ref 7.35–7.45)
PH BLDV: 7.35 — SIGNIFICANT CHANGE UP (ref 7.35–7.45)
PH FLD: 7.8 — SIGNIFICANT CHANGE UP
PH UR: 5 — SIGNIFICANT CHANGE UP (ref 5–8)
PHOSPHATE SERPL-MCNC: 0.8 MG/DL — CRITICAL LOW (ref 2.5–4.5)
PHOSPHATE SERPL-MCNC: 1.3 MG/DL — LOW (ref 2.5–4.5)
PHOSPHATE SERPL-MCNC: 1.5 MG/DL — LOW (ref 2.5–4.5)
PHOSPHATE SERPL-MCNC: 1.7 MG/DL — LOW (ref 2.5–4.5)
PHOSPHATE SERPL-MCNC: 1.8 MG/DL — LOW (ref 2.5–4.5)
PHOSPHATE SERPL-MCNC: 1.9 MG/DL — LOW (ref 2.5–4.5)
PHOSPHATE SERPL-MCNC: 2 MG/DL — LOW (ref 2.5–4.5)
PHOSPHATE SERPL-MCNC: 2.1 MG/DL — LOW (ref 2.5–4.5)
PHOSPHATE SERPL-MCNC: 2.2 MG/DL — LOW (ref 2.5–4.5)
PHOSPHATE SERPL-MCNC: 2.2 MG/DL — LOW (ref 2.5–4.5)
PHOSPHATE SERPL-MCNC: 2.3 MG/DL — LOW (ref 2.5–4.5)
PHOSPHATE SERPL-MCNC: 2.5 MG/DL — SIGNIFICANT CHANGE UP (ref 2.5–4.5)
PHOSPHATE SERPL-MCNC: 2.5 MG/DL — SIGNIFICANT CHANGE UP (ref 2.5–4.5)
PLATELET # BLD AUTO: 127 K/UL — LOW (ref 150–400)
PLATELET # BLD AUTO: 127 K/UL — LOW (ref 150–400)
PLATELET # BLD AUTO: 128 K/UL — LOW (ref 150–400)
PLATELET # BLD AUTO: 135 K/UL — LOW (ref 150–400)
PLATELET # BLD AUTO: 136 K/UL — LOW (ref 150–400)
PLATELET # BLD AUTO: 140 K/UL — LOW (ref 150–400)
PLATELET # BLD AUTO: 154 K/UL — SIGNIFICANT CHANGE UP (ref 150–400)
PLATELET # BLD AUTO: 167 K/UL — SIGNIFICANT CHANGE UP (ref 150–400)
PLATELET # BLD AUTO: 179 K/UL — SIGNIFICANT CHANGE UP (ref 150–400)
PLATELET # BLD AUTO: 196 K/UL — SIGNIFICANT CHANGE UP (ref 150–400)
PLATELET # BLD AUTO: 206 K/UL — SIGNIFICANT CHANGE UP (ref 150–400)
PLATELET # BLD AUTO: 220 K/UL — SIGNIFICANT CHANGE UP (ref 150–400)
PLATELET # BLD AUTO: 222 K/UL — SIGNIFICANT CHANGE UP (ref 150–400)
PLATELET # BLD AUTO: 227 K/UL — SIGNIFICANT CHANGE UP (ref 150–400)
PLATELET # BLD AUTO: 246 K/UL — SIGNIFICANT CHANGE UP (ref 150–400)
PLATELET # BLD AUTO: 280 K/UL — SIGNIFICANT CHANGE UP (ref 150–400)
PLATELET # BLD AUTO: 313 K/UL — SIGNIFICANT CHANGE UP (ref 150–400)
PLATELET # BLD AUTO: 346 K/UL — SIGNIFICANT CHANGE UP (ref 150–400)
PLATELET # BLD AUTO: 426 K/UL — HIGH (ref 150–400)
PLATELET # BLD AUTO: 432 K/UL — HIGH (ref 150–400)
PLATELET # BLD AUTO: 445 K/UL — HIGH (ref 150–400)
PO2 BLDA: 102 MMHG — SIGNIFICANT CHANGE UP (ref 74–108)
PO2 BLDA: 106 MMHG — SIGNIFICANT CHANGE UP (ref 74–108)
PO2 BLDA: 111 MMHG — HIGH (ref 74–108)
PO2 BLDA: 116 MMHG — HIGH (ref 74–108)
PO2 BLDA: 45 MMHG — CRITICAL LOW (ref 74–108)
PO2 BLDA: 50 MMHG — CRITICAL LOW (ref 74–108)
PO2 BLDA: 67 MMHG — LOW (ref 74–108)
PO2 BLDA: 69 MMHG — LOW (ref 74–108)
PO2 BLDA: 69 MMHG — LOW (ref 74–108)
PO2 BLDA: 81 MMHG — SIGNIFICANT CHANGE UP (ref 74–108)
PO2 BLDA: 86 MMHG — SIGNIFICANT CHANGE UP (ref 74–108)
PO2 BLDA: 95 MMHG — SIGNIFICANT CHANGE UP (ref 74–108)
PO2 BLDA: 99 MMHG — SIGNIFICANT CHANGE UP (ref 74–108)
PO2 BLDA: 99 MMHG — SIGNIFICANT CHANGE UP (ref 74–108)
PO2 BLDV: 44 MMHG — SIGNIFICANT CHANGE UP (ref 25–45)
POTASSIUM SERPL-MCNC: 3.3 MMOL/L — LOW (ref 3.5–5.3)
POTASSIUM SERPL-MCNC: 3.3 MMOL/L — LOW (ref 3.5–5.3)
POTASSIUM SERPL-MCNC: 3.4 MMOL/L — LOW (ref 3.5–5.3)
POTASSIUM SERPL-MCNC: 3.5 MMOL/L — SIGNIFICANT CHANGE UP (ref 3.5–5.3)
POTASSIUM SERPL-MCNC: 3.5 MMOL/L — SIGNIFICANT CHANGE UP (ref 3.5–5.3)
POTASSIUM SERPL-MCNC: 3.6 MMOL/L — SIGNIFICANT CHANGE UP (ref 3.5–5.3)
POTASSIUM SERPL-MCNC: 3.7 MMOL/L — SIGNIFICANT CHANGE UP (ref 3.5–5.3)
POTASSIUM SERPL-MCNC: 3.8 MMOL/L — SIGNIFICANT CHANGE UP (ref 3.5–5.3)
POTASSIUM SERPL-MCNC: 3.9 MMOL/L — SIGNIFICANT CHANGE UP (ref 3.5–5.3)
POTASSIUM SERPL-MCNC: 3.9 MMOL/L — SIGNIFICANT CHANGE UP (ref 3.5–5.3)
POTASSIUM SERPL-MCNC: 4 MMOL/L — SIGNIFICANT CHANGE UP (ref 3.5–5.3)
POTASSIUM SERPL-MCNC: 4.2 MMOL/L — SIGNIFICANT CHANGE UP (ref 3.5–5.3)
POTASSIUM SERPL-MCNC: 4.2 MMOL/L — SIGNIFICANT CHANGE UP (ref 3.5–5.3)
POTASSIUM SERPL-MCNC: 4.4 MMOL/L — SIGNIFICANT CHANGE UP (ref 3.5–5.3)
POTASSIUM SERPL-SCNC: 3.3 MMOL/L — LOW (ref 3.5–5.3)
POTASSIUM SERPL-SCNC: 3.3 MMOL/L — LOW (ref 3.5–5.3)
POTASSIUM SERPL-SCNC: 3.4 MMOL/L — LOW (ref 3.5–5.3)
POTASSIUM SERPL-SCNC: 3.5 MMOL/L — SIGNIFICANT CHANGE UP (ref 3.5–5.3)
POTASSIUM SERPL-SCNC: 3.5 MMOL/L — SIGNIFICANT CHANGE UP (ref 3.5–5.3)
POTASSIUM SERPL-SCNC: 3.6 MMOL/L — SIGNIFICANT CHANGE UP (ref 3.5–5.3)
POTASSIUM SERPL-SCNC: 3.7 MMOL/L — SIGNIFICANT CHANGE UP (ref 3.5–5.3)
POTASSIUM SERPL-SCNC: 3.8 MMOL/L — SIGNIFICANT CHANGE UP (ref 3.5–5.3)
POTASSIUM SERPL-SCNC: 3.9 MMOL/L — SIGNIFICANT CHANGE UP (ref 3.5–5.3)
POTASSIUM SERPL-SCNC: 3.9 MMOL/L — SIGNIFICANT CHANGE UP (ref 3.5–5.3)
POTASSIUM SERPL-SCNC: 4 MMOL/L — SIGNIFICANT CHANGE UP (ref 3.5–5.3)
POTASSIUM SERPL-SCNC: 4.2 MMOL/L — SIGNIFICANT CHANGE UP (ref 3.5–5.3)
POTASSIUM SERPL-SCNC: 4.2 MMOL/L — SIGNIFICANT CHANGE UP (ref 3.5–5.3)
POTASSIUM SERPL-SCNC: 4.4 MMOL/L — SIGNIFICANT CHANGE UP (ref 3.5–5.3)
PROT FLD-MCNC: 2.7 G/DL — SIGNIFICANT CHANGE UP
PROT SERPL-MCNC: 4.8 G/DL — LOW (ref 6–8.3)
PROT SERPL-MCNC: 5.3 G/DL — LOW (ref 6–8.3)
PROT SERPL-MCNC: 5.7 G/DL — LOW (ref 6–8.3)
PROT SERPL-MCNC: 6.3 G/DL — SIGNIFICANT CHANGE UP (ref 6–8.3)
PROT SERPL-MCNC: 6.5 G/DL — SIGNIFICANT CHANGE UP (ref 6–8.3)
PROT UR-MCNC: 100
PROTHROM AB SERPL-ACNC: 11.4 SEC — SIGNIFICANT CHANGE UP (ref 9.8–12.7)
PROTHROM AB SERPL-ACNC: 17.7 SEC — HIGH (ref 9.8–12.7)
RAPID RVP RESULT: DETECTED
RBC # BLD: 2.41 M/UL — LOW (ref 3.8–5.2)
RBC # BLD: 2.95 M/UL — LOW (ref 3.8–5.2)
RBC # BLD: 3.07 M/UL — LOW (ref 3.8–5.2)
RBC # BLD: 3.07 M/UL — LOW (ref 3.8–5.2)
RBC # BLD: 3.1 M/UL — LOW (ref 3.8–5.2)
RBC # BLD: 3.14 M/UL — LOW (ref 3.8–5.2)
RBC # BLD: 3.15 M/UL — LOW (ref 3.8–5.2)
RBC # BLD: 3.17 M/UL — LOW (ref 3.8–5.2)
RBC # BLD: 3.2 M/UL — LOW (ref 3.8–5.2)
RBC # BLD: 3.23 M/UL — LOW (ref 3.8–5.2)
RBC # BLD: 3.27 M/UL — LOW (ref 3.8–5.2)
RBC # BLD: 3.36 M/UL — LOW (ref 3.8–5.2)
RBC # BLD: 3.45 M/UL — LOW (ref 3.8–5.2)
RBC # BLD: 3.55 M/UL — LOW (ref 3.8–5.2)
RBC # BLD: 3.69 M/UL — LOW (ref 3.8–5.2)
RBC # BLD: 3.7 M/UL — LOW (ref 3.8–5.2)
RBC # BLD: 3.76 M/UL — LOW (ref 3.8–5.2)
RBC # BLD: 3.76 M/UL — LOW (ref 3.8–5.2)
RBC # BLD: 3.88 M/UL — SIGNIFICANT CHANGE UP (ref 3.8–5.2)
RBC # BLD: 4.5 M/UL — SIGNIFICANT CHANGE UP (ref 3.8–5.2)
RBC # BLD: 4.51 M/UL — SIGNIFICANT CHANGE UP (ref 3.8–5.2)
RBC # FLD: 15 % — HIGH (ref 10.3–14.5)
RBC # FLD: 15.1 % — HIGH (ref 10.3–14.5)
RBC # FLD: 15.3 % — HIGH (ref 10.3–14.5)
RBC # FLD: 15.6 % — HIGH (ref 10.3–14.5)
RBC # FLD: 15.6 % — HIGH (ref 10.3–14.5)
RBC # FLD: 17.3 % — HIGH (ref 10.3–14.5)
RBC # FLD: 17.4 % — HIGH (ref 10.3–14.5)
RBC # FLD: 17.4 % — HIGH (ref 10.3–14.5)
RBC # FLD: 17.5 % — HIGH (ref 10.3–14.5)
RBC # FLD: 17.7 % — HIGH (ref 10.3–14.5)
RBC # FLD: 17.8 % — HIGH (ref 10.3–14.5)
RBC # FLD: 17.8 % — HIGH (ref 10.3–14.5)
RCV VOL RI: HIGH /UL (ref 0–5)
RSV RNA SPEC QL NAA+PROBE: DETECTED
SAO2 % BLDA: 80 % — LOW (ref 92–96)
SAO2 % BLDA: 83 % — LOW (ref 92–96)
SAO2 % BLDA: 94 % — SIGNIFICANT CHANGE UP (ref 92–96)
SAO2 % BLDA: 96 % — SIGNIFICANT CHANGE UP (ref 92–96)
SAO2 % BLDA: 97 % — HIGH (ref 92–96)
SAO2 % BLDA: 97 % — HIGH (ref 92–96)
SAO2 % BLDA: 98 % — HIGH (ref 92–96)
SAO2 % BLDV: 79 % — SIGNIFICANT CHANGE UP (ref 67–88)
SODIUM SERPL-SCNC: 140 MMOL/L — SIGNIFICANT CHANGE UP (ref 135–145)
SODIUM SERPL-SCNC: 140 MMOL/L — SIGNIFICANT CHANGE UP (ref 135–145)
SODIUM SERPL-SCNC: 141 MMOL/L — SIGNIFICANT CHANGE UP (ref 135–145)
SODIUM SERPL-SCNC: 141 MMOL/L — SIGNIFICANT CHANGE UP (ref 135–145)
SODIUM SERPL-SCNC: 142 MMOL/L — SIGNIFICANT CHANGE UP (ref 135–145)
SODIUM SERPL-SCNC: 143 MMOL/L — SIGNIFICANT CHANGE UP (ref 135–145)
SODIUM SERPL-SCNC: 144 MMOL/L — SIGNIFICANT CHANGE UP (ref 135–145)
SODIUM SERPL-SCNC: 145 MMOL/L — SIGNIFICANT CHANGE UP (ref 135–145)
SODIUM SERPL-SCNC: 146 MMOL/L — HIGH (ref 135–145)
SODIUM SERPL-SCNC: 147 MMOL/L — HIGH (ref 135–145)
SODIUM SERPL-SCNC: 148 MMOL/L — HIGH (ref 135–145)
SODIUM SERPL-SCNC: 149 MMOL/L — HIGH (ref 135–145)
SODIUM SERPL-SCNC: 150 MMOL/L — HIGH (ref 135–145)
SODIUM SERPL-SCNC: 151 MMOL/L — HIGH (ref 135–145)
SP GR SPEC: 1.02 — SIGNIFICANT CHANGE UP (ref 1.01–1.02)
SPECIMEN SOURCE: SIGNIFICANT CHANGE UP
T3 SERPL-MCNC: 72 NG/DL — LOW (ref 80–200)
T3FREE SERPL-MCNC: 2.15 PG/ML — SIGNIFICANT CHANGE UP (ref 1.8–4.6)
T4 AB SER-ACNC: 5.2 UG/DL — SIGNIFICANT CHANGE UP (ref 4.6–12)
T4 FREE SERPL-MCNC: 0.8 NG/DL — LOW (ref 0.9–1.8)
TOTAL CHOLESTEROL/HDL RATIO MEASUREMENT: 2.3 RATIO — LOW (ref 3.3–7.1)
TOTAL NUCLEATED CELL COUNT, BODY FLUID: 2500 /UL — HIGH (ref 0–5)
TRIGL SERPL-MCNC: 67 MG/DL — SIGNIFICANT CHANGE UP (ref 10–149)
TROPONIN I SERPL-MCNC: 0.03 NG/ML — SIGNIFICANT CHANGE UP (ref 0–0.04)
TROPONIN I SERPL-MCNC: 1.03 NG/ML — HIGH (ref 0–0.04)
TROPONIN I SERPL-MCNC: 1.54 NG/ML — HIGH (ref 0–0.04)
TROPONIN I SERPL-MCNC: 1.56 NG/ML — HIGH (ref 0–0.04)
TROPONIN I SERPL-MCNC: <0.015 NG/ML — SIGNIFICANT CHANGE UP (ref 0–0.04)
TSH SERPL-MCNC: 0.01 UU/ML — LOW (ref 0.34–4.82)
TSH SERPL-MCNC: <0.01 UU/ML — LOW (ref 0.34–4.82)
TUBE TYPE: SIGNIFICANT CHANGE UP
UROBILINOGEN FLD QL: 4
VIT B12 SERPL-MCNC: 311 PG/ML — SIGNIFICANT CHANGE UP (ref 243–894)
WBC # BLD: 11.1 K/UL — HIGH (ref 3.8–10.5)
WBC # BLD: 12.4 K/UL — HIGH (ref 3.8–10.5)
WBC # BLD: 13.2 K/UL — HIGH (ref 3.8–10.5)
WBC # BLD: 14.8 K/UL — HIGH (ref 3.8–10.5)
WBC # BLD: 16.1 K/UL — HIGH (ref 3.8–10.5)
WBC # BLD: 17.4 K/UL — HIGH (ref 3.8–10.5)
WBC # BLD: 18.3 K/UL — HIGH (ref 3.8–10.5)
WBC # BLD: 18.5 K/UL — HIGH (ref 3.8–10.5)
WBC # BLD: 20.8 K/UL — HIGH (ref 3.8–10.5)
WBC # BLD: 21.5 K/UL — HIGH (ref 3.8–10.5)
WBC # BLD: 21.9 K/UL — HIGH (ref 3.8–10.5)
WBC # BLD: 22.5 K/UL — HIGH (ref 3.8–10.5)
WBC # BLD: 24.2 K/UL — HIGH (ref 3.8–10.5)
WBC # BLD: 25.6 K/UL — HIGH (ref 3.8–10.5)
WBC # BLD: 25.8 K/UL — HIGH (ref 3.8–10.5)
WBC # BLD: 32.6 K/UL — HIGH (ref 3.8–10.5)
WBC # BLD: 40.2 K/UL — CRITICAL HIGH (ref 3.8–10.5)
WBC # BLD: 7.7 K/UL — SIGNIFICANT CHANGE UP (ref 3.8–10.5)
WBC # BLD: 8.5 K/UL — SIGNIFICANT CHANGE UP (ref 3.8–10.5)
WBC # BLD: 9.2 K/UL — SIGNIFICANT CHANGE UP (ref 3.8–10.5)
WBC # BLD: 9.6 K/UL — SIGNIFICANT CHANGE UP (ref 3.8–10.5)
WBC # FLD AUTO: 11.1 K/UL — HIGH (ref 3.8–10.5)
WBC # FLD AUTO: 12.4 K/UL — HIGH (ref 3.8–10.5)
WBC # FLD AUTO: 13.2 K/UL — HIGH (ref 3.8–10.5)
WBC # FLD AUTO: 14.8 K/UL — HIGH (ref 3.8–10.5)
WBC # FLD AUTO: 16.1 K/UL — HIGH (ref 3.8–10.5)
WBC # FLD AUTO: 17.4 K/UL — HIGH (ref 3.8–10.5)
WBC # FLD AUTO: 18.3 K/UL — HIGH (ref 3.8–10.5)
WBC # FLD AUTO: 18.5 K/UL — HIGH (ref 3.8–10.5)
WBC # FLD AUTO: 20.8 K/UL — HIGH (ref 3.8–10.5)
WBC # FLD AUTO: 21.5 K/UL — HIGH (ref 3.8–10.5)
WBC # FLD AUTO: 21.9 K/UL — HIGH (ref 3.8–10.5)
WBC # FLD AUTO: 22.5 K/UL — HIGH (ref 3.8–10.5)
WBC # FLD AUTO: 24.2 K/UL — HIGH (ref 3.8–10.5)
WBC # FLD AUTO: 25.6 K/UL — HIGH (ref 3.8–10.5)
WBC # FLD AUTO: 25.8 K/UL — HIGH (ref 3.8–10.5)
WBC # FLD AUTO: 32.6 K/UL — HIGH (ref 3.8–10.5)
WBC # FLD AUTO: 40.2 K/UL — CRITICAL HIGH (ref 3.8–10.5)
WBC # FLD AUTO: 7.7 K/UL — SIGNIFICANT CHANGE UP (ref 3.8–10.5)
WBC # FLD AUTO: 8.5 K/UL — SIGNIFICANT CHANGE UP (ref 3.8–10.5)
WBC # FLD AUTO: 9.2 K/UL — SIGNIFICANT CHANGE UP (ref 3.8–10.5)
WBC # FLD AUTO: 9.6 K/UL — SIGNIFICANT CHANGE UP (ref 3.8–10.5)

## 2017-01-01 PROCEDURE — 76705 ECHO EXAM OF ABDOMEN: CPT | Mod: 26

## 2017-01-01 PROCEDURE — 84100 ASSAY OF PHOSPHORUS: CPT

## 2017-01-01 PROCEDURE — 93306 TTE W/DOPPLER COMPLETE: CPT

## 2017-01-01 PROCEDURE — 71010: CPT | Mod: 26

## 2017-01-01 PROCEDURE — 87449 NOS EACH ORGANISM AG IA: CPT

## 2017-01-01 PROCEDURE — 31500 INSERT EMERGENCY AIRWAY: CPT

## 2017-01-01 PROCEDURE — 71010: CPT | Mod: 26,77

## 2017-01-01 PROCEDURE — 87184 SC STD DISK METHOD PER PLATE: CPT

## 2017-01-01 PROCEDURE — 87205 SMEAR GRAM STAIN: CPT

## 2017-01-01 PROCEDURE — 82803 BLOOD GASES ANY COMBINATION: CPT

## 2017-01-01 PROCEDURE — 99233 SBSQ HOSP IP/OBS HIGH 50: CPT

## 2017-01-01 PROCEDURE — 89051 BODY FLUID CELL COUNT: CPT

## 2017-01-01 PROCEDURE — 82550 ASSAY OF CK (CPK): CPT

## 2017-01-01 PROCEDURE — 84443 ASSAY THYROID STIM HORMONE: CPT

## 2017-01-01 PROCEDURE — 99223 1ST HOSP IP/OBS HIGH 75: CPT

## 2017-01-01 PROCEDURE — 99291 CRITICAL CARE FIRST HOUR: CPT | Mod: 25

## 2017-01-01 PROCEDURE — 73502 X-RAY EXAM HIP UNI 2-3 VIEWS: CPT | Mod: 26,RT

## 2017-01-01 PROCEDURE — 82962 GLUCOSE BLOOD TEST: CPT

## 2017-01-01 PROCEDURE — 87086 URINE CULTURE/COLONY COUNT: CPT

## 2017-01-01 PROCEDURE — 87486 CHLMYD PNEUM DNA AMP PROBE: CPT

## 2017-01-01 PROCEDURE — 99285 EMERGENCY DEPT VISIT HI MDM: CPT | Mod: 25

## 2017-01-01 PROCEDURE — 87581 M.PNEUMON DNA AMP PROBE: CPT

## 2017-01-01 PROCEDURE — 87075 CULTR BACTERIA EXCEPT BLOOD: CPT

## 2017-01-01 PROCEDURE — 83036 HEMOGLOBIN GLYCOSYLATED A1C: CPT

## 2017-01-01 PROCEDURE — 80053 COMPREHEN METABOLIC PANEL: CPT

## 2017-01-01 PROCEDURE — 71010: CPT | Mod: 26,76

## 2017-01-01 PROCEDURE — 76705 ECHO EXAM OF ABDOMEN: CPT

## 2017-01-01 PROCEDURE — 73700 CT LOWER EXTREMITY W/O DYE: CPT | Mod: 26,RT

## 2017-01-01 PROCEDURE — 87798 DETECT AGENT NOS DNA AMP: CPT

## 2017-01-01 PROCEDURE — 85610 PROTHROMBIN TIME: CPT

## 2017-01-01 PROCEDURE — 71260 CT THORAX DX C+: CPT

## 2017-01-01 PROCEDURE — 71260 CT THORAX DX C+: CPT | Mod: 26

## 2017-01-01 PROCEDURE — 93005 ELECTROCARDIOGRAM TRACING: CPT

## 2017-01-01 PROCEDURE — 81001 URINALYSIS AUTO W/SCOPE: CPT

## 2017-01-01 PROCEDURE — 74177 CT ABD & PELVIS W/CONTRAST: CPT

## 2017-01-01 PROCEDURE — 87493 C DIFF AMPLIFIED PROBE: CPT

## 2017-01-01 PROCEDURE — 84157 ASSAY OF PROTEIN OTHER: CPT

## 2017-01-01 PROCEDURE — 94003 VENT MGMT INPAT SUBQ DAY: CPT

## 2017-01-01 PROCEDURE — 83735 ASSAY OF MAGNESIUM: CPT

## 2017-01-01 PROCEDURE — 87070 CULTURE OTHR SPECIMN AEROBIC: CPT

## 2017-01-01 PROCEDURE — 74177 CT ABD & PELVIS W/CONTRAST: CPT | Mod: 26

## 2017-01-01 PROCEDURE — 99292 CRITICAL CARE ADDL 30 MIN: CPT

## 2017-01-01 PROCEDURE — 94002 VENT MGMT INPAT INIT DAY: CPT

## 2017-01-01 PROCEDURE — 84480 ASSAY TRIIODOTHYRONINE (T3): CPT

## 2017-01-01 PROCEDURE — 83615 LACTATE (LD) (LDH) ENZYME: CPT

## 2017-01-01 PROCEDURE — 84436 ASSAY OF TOTAL THYROXINE: CPT

## 2017-01-01 PROCEDURE — 82945 GLUCOSE OTHER FLUID: CPT

## 2017-01-01 PROCEDURE — P9047: CPT

## 2017-01-01 PROCEDURE — 87633 RESP VIRUS 12-25 TARGETS: CPT

## 2017-01-01 PROCEDURE — 71045 X-RAY EXAM CHEST 1 VIEW: CPT

## 2017-01-01 PROCEDURE — 80048 BASIC METABOLIC PNL TOTAL CA: CPT

## 2017-01-01 PROCEDURE — 99291 CRITICAL CARE FIRST HOUR: CPT

## 2017-01-01 PROCEDURE — 87150 DNA/RNA AMPLIFIED PROBE: CPT

## 2017-01-01 PROCEDURE — 85730 THROMBOPLASTIN TIME PARTIAL: CPT

## 2017-01-01 PROCEDURE — 82553 CREATINE MB FRACTION: CPT

## 2017-01-01 PROCEDURE — 85027 COMPLETE CBC AUTOMATED: CPT

## 2017-01-01 PROCEDURE — 83986 ASSAY PH BODY FLUID NOS: CPT

## 2017-01-01 PROCEDURE — 83605 ASSAY OF LACTIC ACID: CPT

## 2017-01-01 PROCEDURE — 84484 ASSAY OF TROPONIN QUANT: CPT

## 2017-01-01 PROCEDURE — 87040 BLOOD CULTURE FOR BACTERIA: CPT

## 2017-01-01 PROCEDURE — 82306 VITAMIN D 25 HYDROXY: CPT

## 2017-01-01 RX ORDER — SODIUM CHLORIDE 9 MG/ML
1000 INJECTION, SOLUTION INTRAVENOUS
Qty: 0 | Refills: 0 | Status: DISCONTINUED | OUTPATIENT
Start: 2017-01-01 | End: 2017-01-01

## 2017-01-01 RX ORDER — SCOPALAMINE 1 MG/3D
1.5 PATCH, EXTENDED RELEASE TRANSDERMAL
Qty: 0 | Refills: 0 | Status: DISCONTINUED | OUTPATIENT
Start: 2017-01-01 | End: 2017-01-01

## 2017-01-01 RX ORDER — SODIUM CHLORIDE 9 MG/ML
1000 INJECTION, SOLUTION INTRAVENOUS ONCE
Qty: 0 | Refills: 0 | Status: COMPLETED | OUTPATIENT
Start: 2017-01-01 | End: 2017-01-01

## 2017-01-01 RX ORDER — SODIUM CHLORIDE 9 MG/ML
1000 INJECTION, SOLUTION INTRAVENOUS
Qty: 0 | Refills: 0 | Status: COMPLETED | OUTPATIENT
Start: 2017-01-01 | End: 2017-01-01

## 2017-01-01 RX ORDER — FENTANYL CITRATE 50 UG/ML
100 INJECTION INTRAVENOUS ONCE
Qty: 0 | Refills: 0 | Status: DISCONTINUED | OUTPATIENT
Start: 2017-01-01 | End: 2017-01-01

## 2017-01-01 RX ORDER — DEXTROSE 50 % IN WATER 50 %
1 SYRINGE (ML) INTRAVENOUS ONCE
Qty: 0 | Refills: 0 | Status: DISCONTINUED | OUTPATIENT
Start: 2017-01-01 | End: 2017-01-01

## 2017-01-01 RX ORDER — CALCIUM GLUCONATE 100 MG/ML
1 VIAL (ML) INTRAVENOUS ONCE
Qty: 0 | Refills: 0 | Status: COMPLETED | OUTPATIENT
Start: 2017-01-01 | End: 2017-01-01

## 2017-01-01 RX ORDER — FENTANYL CITRATE 50 UG/ML
50 INJECTION INTRAVENOUS ONCE
Qty: 0 | Refills: 0 | Status: DISCONTINUED | OUTPATIENT
Start: 2017-01-01 | End: 2017-01-01

## 2017-01-01 RX ORDER — ASPIRIN/CALCIUM CARB/MAGNESIUM 324 MG
81 TABLET ORAL DAILY
Qty: 0 | Refills: 0 | Status: DISCONTINUED | OUTPATIENT
Start: 2017-01-01 | End: 2017-01-01

## 2017-01-01 RX ORDER — FUROSEMIDE 40 MG
40 TABLET ORAL EVERY 12 HOURS
Qty: 0 | Refills: 0 | Status: DISCONTINUED | OUTPATIENT
Start: 2017-01-01 | End: 2017-01-01

## 2017-01-01 RX ORDER — HYDROMORPHONE HYDROCHLORIDE 2 MG/ML
2 INJECTION INTRAMUSCULAR; INTRAVENOUS; SUBCUTANEOUS
Qty: 0 | Refills: 0 | Status: DISCONTINUED | OUTPATIENT
Start: 2017-01-01 | End: 2017-01-01

## 2017-01-01 RX ORDER — FENTANYL CITRATE 50 UG/ML
25 INJECTION INTRAVENOUS EVERY 4 HOURS
Qty: 0 | Refills: 0 | Status: DISCONTINUED | OUTPATIENT
Start: 2017-01-01 | End: 2017-01-01

## 2017-01-01 RX ORDER — POTASSIUM PHOSPHATE, MONOBASIC POTASSIUM PHOSPHATE, DIBASIC 236; 224 MG/ML; MG/ML
30 INJECTION, SOLUTION INTRAVENOUS EVERY 6 HOURS
Qty: 0 | Refills: 0 | Status: DISCONTINUED | OUTPATIENT
Start: 2017-01-01 | End: 2017-01-01

## 2017-01-01 RX ORDER — ASCORBIC ACID 60 MG
500 TABLET,CHEWABLE ORAL
Qty: 0 | Refills: 0 | Status: DISCONTINUED | OUTPATIENT
Start: 2017-01-01 | End: 2017-01-01

## 2017-01-01 RX ORDER — LEVOCETIRIZINE DIHYDROCHLORIDE 0.5 MG/ML
0 SOLUTION ORAL
Qty: 0 | Refills: 0 | COMMUNITY

## 2017-01-01 RX ORDER — MAGNESIUM SULFATE 500 MG/ML
2 VIAL (ML) INJECTION
Qty: 0 | Refills: 0 | Status: COMPLETED | OUTPATIENT
Start: 2017-01-01 | End: 2017-01-01

## 2017-01-01 RX ORDER — PIPERACILLIN AND TAZOBACTAM 4; .5 G/20ML; G/20ML
3.38 INJECTION, POWDER, LYOPHILIZED, FOR SOLUTION INTRAVENOUS EVERY 8 HOURS
Qty: 0 | Refills: 0 | Status: DISCONTINUED | OUTPATIENT
Start: 2017-01-01 | End: 2017-01-01

## 2017-01-01 RX ORDER — SODIUM,POTASSIUM PHOSPHATES 278-250MG
1 POWDER IN PACKET (EA) ORAL
Qty: 0 | Refills: 0 | Status: COMPLETED | OUTPATIENT
Start: 2017-01-01 | End: 2017-01-01

## 2017-01-01 RX ORDER — PROPYLTHIOURACIL 50 MG
0 TABLET ORAL
Qty: 30 | Refills: 0 | COMMUNITY

## 2017-01-01 RX ORDER — HYDROMORPHONE HYDROCHLORIDE 2 MG/ML
1 INJECTION INTRAMUSCULAR; INTRAVENOUS; SUBCUTANEOUS EVERY 4 HOURS
Qty: 0 | Refills: 0 | Status: DISCONTINUED | OUTPATIENT
Start: 2017-01-01 | End: 2017-01-01

## 2017-01-01 RX ORDER — POTASSIUM CHLORIDE 20 MEQ
10 PACKET (EA) ORAL
Qty: 0 | Refills: 0 | Status: COMPLETED | OUTPATIENT
Start: 2017-01-01 | End: 2017-01-01

## 2017-01-01 RX ORDER — AMIODARONE HYDROCHLORIDE 400 MG/1
0.5 TABLET ORAL
Qty: 900 | Refills: 0 | Status: DISCONTINUED | OUTPATIENT
Start: 2017-01-01 | End: 2017-01-01

## 2017-01-01 RX ORDER — FENTANYL CITRATE 50 UG/ML
3 INJECTION INTRAVENOUS
Qty: 2500 | Refills: 0 | Status: DISCONTINUED | OUTPATIENT
Start: 2017-01-01 | End: 2017-01-01

## 2017-01-01 RX ORDER — POTASSIUM PHOSPHATE, MONOBASIC POTASSIUM PHOSPHATE, DIBASIC 236; 224 MG/ML; MG/ML
15 INJECTION, SOLUTION INTRAVENOUS ONCE
Qty: 0 | Refills: 0 | Status: COMPLETED | OUTPATIENT
Start: 2017-01-01 | End: 2017-01-01

## 2017-01-01 RX ORDER — MORPHINE SULFATE 50 MG/1
4 CAPSULE, EXTENDED RELEASE ORAL ONCE
Qty: 0 | Refills: 0 | Status: DISCONTINUED | OUTPATIENT
Start: 2017-01-01 | End: 2017-01-01

## 2017-01-01 RX ORDER — PANTOPRAZOLE SODIUM 20 MG/1
40 TABLET, DELAYED RELEASE ORAL DAILY
Qty: 0 | Refills: 0 | Status: COMPLETED | OUTPATIENT
Start: 2017-01-01 | End: 2017-01-01

## 2017-01-01 RX ORDER — CEFTRIAXONE 500 MG/1
2 INJECTION, POWDER, FOR SOLUTION INTRAMUSCULAR; INTRAVENOUS ONCE
Qty: 0 | Refills: 0 | Status: COMPLETED | OUTPATIENT
Start: 2017-01-01 | End: 2017-01-01

## 2017-01-01 RX ORDER — MIDAZOLAM HYDROCHLORIDE 1 MG/ML
1 INJECTION, SOLUTION INTRAMUSCULAR; INTRAVENOUS ONCE
Qty: 0 | Refills: 0 | Status: DISCONTINUED | OUTPATIENT
Start: 2017-01-01 | End: 2017-01-01

## 2017-01-01 RX ORDER — FENTANYL CITRATE 50 UG/ML
0.5 INJECTION INTRAVENOUS
Qty: 2500 | Refills: 0 | Status: DISCONTINUED | OUTPATIENT
Start: 2017-01-01 | End: 2017-01-01

## 2017-01-01 RX ORDER — ACETAMINOPHEN 500 MG
1000 TABLET ORAL EVERY 6 HOURS
Qty: 0 | Refills: 0 | Status: COMPLETED | OUTPATIENT
Start: 2017-01-01 | End: 2017-01-01

## 2017-01-01 RX ORDER — SODIUM CHLORIDE 9 MG/ML
1000 INJECTION INTRAMUSCULAR; INTRAVENOUS; SUBCUTANEOUS ONCE
Qty: 0 | Refills: 0 | Status: COMPLETED | OUTPATIENT
Start: 2017-01-01 | End: 2017-01-01

## 2017-01-01 RX ORDER — FENTANYL CITRATE 50 UG/ML
4 INJECTION INTRAVENOUS
Qty: 2500 | Refills: 0 | Status: DISCONTINUED | OUTPATIENT
Start: 2017-01-01 | End: 2017-01-01

## 2017-01-01 RX ORDER — ENOXAPARIN SODIUM 100 MG/ML
60 INJECTION SUBCUTANEOUS ONCE
Qty: 0 | Refills: 0 | Status: COMPLETED | OUTPATIENT
Start: 2017-01-01 | End: 2017-01-01

## 2017-01-01 RX ORDER — PANTOPRAZOLE SODIUM 20 MG/1
40 TABLET, DELAYED RELEASE ORAL
Qty: 0 | Refills: 0 | Status: DISCONTINUED | OUTPATIENT
Start: 2017-01-01 | End: 2017-01-01

## 2017-01-01 RX ORDER — HYDROMORPHONE HYDROCHLORIDE 2 MG/ML
1 INJECTION INTRAMUSCULAR; INTRAVENOUS; SUBCUTANEOUS ONCE
Qty: 0 | Refills: 0 | Status: DISCONTINUED | OUTPATIENT
Start: 2017-01-01 | End: 2017-01-01

## 2017-01-01 RX ORDER — ALBUMIN HUMAN 25 %
100 VIAL (ML) INTRAVENOUS EVERY 6 HOURS
Qty: 0 | Refills: 0 | Status: COMPLETED | OUTPATIENT
Start: 2017-01-01 | End: 2017-01-01

## 2017-01-01 RX ORDER — ZOLPIDEM TARTRATE 10 MG/1
0 TABLET ORAL
Qty: 0 | Refills: 0 | COMMUNITY

## 2017-01-01 RX ORDER — KETOROLAC TROMETHAMINE 30 MG/ML
15 SYRINGE (ML) INJECTION EVERY 6 HOURS
Qty: 0 | Refills: 0 | Status: DISCONTINUED | OUTPATIENT
Start: 2017-01-01 | End: 2017-01-01

## 2017-01-01 RX ORDER — MORPHINE SULFATE 50 MG/1
2 CAPSULE, EXTENDED RELEASE ORAL EVERY 6 HOURS
Qty: 0 | Refills: 0 | Status: DISCONTINUED | OUTPATIENT
Start: 2017-01-01 | End: 2017-01-01

## 2017-01-01 RX ORDER — ENOXAPARIN SODIUM 100 MG/ML
40 INJECTION SUBCUTANEOUS DAILY
Qty: 0 | Refills: 0 | Status: DISCONTINUED | OUTPATIENT
Start: 2017-01-01 | End: 2017-01-01

## 2017-01-01 RX ORDER — DEXTROSE 50 % IN WATER 50 %
25 SYRINGE (ML) INTRAVENOUS ONCE
Qty: 0 | Refills: 0 | Status: DISCONTINUED | OUTPATIENT
Start: 2017-01-01 | End: 2017-01-01

## 2017-01-01 RX ORDER — HYDROMORPHONE HYDROCHLORIDE 2 MG/ML
0.5 INJECTION INTRAMUSCULAR; INTRAVENOUS; SUBCUTANEOUS ONCE
Qty: 0 | Refills: 0 | Status: DISCONTINUED | OUTPATIENT
Start: 2017-01-01 | End: 2017-01-01

## 2017-01-01 RX ORDER — MEROPENEM 1 G/30ML
1000 INJECTION INTRAVENOUS ONCE
Qty: 0 | Refills: 0 | Status: COMPLETED | OUTPATIENT
Start: 2017-01-01 | End: 2017-01-01

## 2017-01-01 RX ORDER — AMIODARONE HYDROCHLORIDE 400 MG/1
1 TABLET ORAL
Qty: 900 | Refills: 0 | Status: DISCONTINUED | OUTPATIENT
Start: 2017-01-01 | End: 2017-01-01

## 2017-01-01 RX ORDER — TIOTROPIUM BROMIDE 18 UG/1
1 CAPSULE ORAL; RESPIRATORY (INHALATION) DAILY
Qty: 0 | Refills: 0 | Status: DISCONTINUED | OUTPATIENT
Start: 2017-01-01 | End: 2017-01-01

## 2017-01-01 RX ORDER — DOCUSATE SODIUM 100 MG
100 CAPSULE ORAL THREE TIMES A DAY
Qty: 0 | Refills: 0 | Status: DISCONTINUED | OUTPATIENT
Start: 2017-01-01 | End: 2017-01-01

## 2017-01-01 RX ORDER — SODIUM CHLORIDE 9 MG/ML
500 INJECTION INTRAMUSCULAR; INTRAVENOUS; SUBCUTANEOUS ONCE
Qty: 0 | Refills: 0 | Status: COMPLETED | OUTPATIENT
Start: 2017-01-01 | End: 2017-01-01

## 2017-01-01 RX ORDER — CEFTRIAXONE 500 MG/1
2 INJECTION, POWDER, FOR SOLUTION INTRAMUSCULAR; INTRAVENOUS EVERY 24 HOURS
Qty: 0 | Refills: 0 | Status: DISCONTINUED | OUTPATIENT
Start: 2017-01-01 | End: 2017-01-01

## 2017-01-01 RX ORDER — FENTANYL CITRATE 50 UG/ML
50 INJECTION INTRAVENOUS EVERY 4 HOURS
Qty: 0 | Refills: 0 | Status: DISCONTINUED | OUTPATIENT
Start: 2017-01-01 | End: 2017-01-01

## 2017-01-01 RX ORDER — KETOROLAC TROMETHAMINE 30 MG/ML
15 SYRINGE (ML) INJECTION EVERY 8 HOURS
Qty: 0 | Refills: 0 | Status: DISCONTINUED | OUTPATIENT
Start: 2017-01-01 | End: 2017-01-01

## 2017-01-01 RX ORDER — PANTOPRAZOLE SODIUM 20 MG/1
40 TABLET, DELAYED RELEASE ORAL DAILY
Qty: 0 | Refills: 0 | Status: DISCONTINUED | OUTPATIENT
Start: 2017-01-01 | End: 2017-01-01

## 2017-01-01 RX ORDER — FERROUS SULFATE 325(65) MG
300 TABLET ORAL DAILY
Qty: 0 | Refills: 0 | Status: DISCONTINUED | OUTPATIENT
Start: 2017-01-01 | End: 2017-01-01

## 2017-01-01 RX ORDER — GLUCAGON INJECTION, SOLUTION 0.5 MG/.1ML
1 INJECTION, SOLUTION SUBCUTANEOUS ONCE
Qty: 0 | Refills: 0 | Status: DISCONTINUED | OUTPATIENT
Start: 2017-01-01 | End: 2017-01-01

## 2017-01-01 RX ORDER — HYDROMORPHONE HYDROCHLORIDE 2 MG/ML
2 INJECTION INTRAMUSCULAR; INTRAVENOUS; SUBCUTANEOUS ONCE
Qty: 0 | Refills: 0 | Status: DISCONTINUED | OUTPATIENT
Start: 2017-01-01 | End: 2017-01-01

## 2017-01-01 RX ORDER — HALOPERIDOL DECANOATE 100 MG/ML
0.5 INJECTION INTRAMUSCULAR ONCE
Qty: 0 | Refills: 0 | Status: DISCONTINUED | OUTPATIENT
Start: 2017-01-01 | End: 2017-01-01

## 2017-01-01 RX ORDER — SODIUM CHLORIDE 9 MG/ML
1000 INJECTION INTRAMUSCULAR; INTRAVENOUS; SUBCUTANEOUS
Qty: 0 | Refills: 0 | Status: DISCONTINUED | OUTPATIENT
Start: 2017-01-01 | End: 2017-01-01

## 2017-01-01 RX ORDER — ENOXAPARIN SODIUM 100 MG/ML
40 INJECTION SUBCUTANEOUS
Qty: 0 | Refills: 0 | COMMUNITY
Start: 2017-01-01

## 2017-01-01 RX ORDER — ASCORBIC ACID 60 MG
1 TABLET,CHEWABLE ORAL
Qty: 0 | Refills: 0 | COMMUNITY
Start: 2017-01-01

## 2017-01-01 RX ORDER — POTASSIUM PHOSPHATE, MONOBASIC POTASSIUM PHOSPHATE, DIBASIC 236; 224 MG/ML; MG/ML
15 INJECTION, SOLUTION INTRAVENOUS ONCE
Qty: 0 | Refills: 0 | Status: DISCONTINUED | OUTPATIENT
Start: 2017-01-01 | End: 2017-01-01

## 2017-01-01 RX ORDER — MORPHINE SULFATE 50 MG/1
2 CAPSULE, EXTENDED RELEASE ORAL ONCE
Qty: 0 | Refills: 0 | Status: DISCONTINUED | OUTPATIENT
Start: 2017-01-01 | End: 2017-01-01

## 2017-01-01 RX ORDER — METOPROLOL TARTRATE 50 MG
5 TABLET ORAL ONCE
Qty: 0 | Refills: 0 | Status: COMPLETED | OUTPATIENT
Start: 2017-01-01 | End: 2017-01-01

## 2017-01-01 RX ORDER — MEROPENEM 1 G/30ML
1000 INJECTION INTRAVENOUS EVERY 8 HOURS
Qty: 0 | Refills: 0 | Status: DISCONTINUED | OUTPATIENT
Start: 2017-01-01 | End: 2017-01-01

## 2017-01-01 RX ORDER — ZOLPIDEM TARTRATE 10 MG/1
1 TABLET ORAL
Qty: 0 | Refills: 0 | COMMUNITY

## 2017-01-01 RX ORDER — PROPYLTHIOURACIL 50 MG
50 TABLET ORAL DAILY
Qty: 0 | Refills: 0 | Status: DISCONTINUED | OUTPATIENT
Start: 2017-01-01 | End: 2017-01-01

## 2017-01-01 RX ORDER — QUETIAPINE FUMARATE 200 MG/1
25 TABLET, FILM COATED ORAL AT BEDTIME
Qty: 0 | Refills: 0 | Status: DISCONTINUED | OUTPATIENT
Start: 2017-01-01 | End: 2017-01-01

## 2017-01-01 RX ORDER — ACETAMINOPHEN 500 MG
650 TABLET ORAL EVERY 6 HOURS
Qty: 0 | Refills: 0 | Status: DISCONTINUED | OUTPATIENT
Start: 2017-01-01 | End: 2017-01-01

## 2017-01-01 RX ORDER — ROBINUL 0.2 MG/ML
0.2 INJECTION INTRAMUSCULAR; INTRAVENOUS EVERY 6 HOURS
Qty: 0 | Refills: 0 | Status: DISCONTINUED | OUTPATIENT
Start: 2017-01-01 | End: 2017-01-01

## 2017-01-01 RX ORDER — PROPYLTHIOURACIL 50 MG
1 TABLET ORAL
Qty: 0 | Refills: 0 | COMMUNITY

## 2017-01-01 RX ORDER — FOLIC ACID 0.8 MG
1 TABLET ORAL DAILY
Qty: 0 | Refills: 0 | Status: DISCONTINUED | OUTPATIENT
Start: 2017-01-01 | End: 2017-01-01

## 2017-01-01 RX ORDER — HEPARIN SODIUM 5000 [USP'U]/ML
5000 INJECTION INTRAVENOUS; SUBCUTANEOUS EVERY 8 HOURS
Qty: 0 | Refills: 0 | Status: DISCONTINUED | OUTPATIENT
Start: 2017-01-01 | End: 2017-01-01

## 2017-01-01 RX ORDER — PIPERACILLIN AND TAZOBACTAM 4; .5 G/20ML; G/20ML
3.38 INJECTION, POWDER, LYOPHILIZED, FOR SOLUTION INTRAVENOUS ONCE
Qty: 0 | Refills: 0 | Status: COMPLETED | OUTPATIENT
Start: 2017-01-01 | End: 2017-01-01

## 2017-01-01 RX ORDER — KETOROLAC TROMETHAMINE 30 MG/ML
10 SYRINGE (ML) INJECTION EVERY 6 HOURS
Qty: 0 | Refills: 0 | Status: DISCONTINUED | OUTPATIENT
Start: 2017-01-01 | End: 2017-01-01

## 2017-01-01 RX ORDER — FERROUS SULFATE 325(65) MG
325 TABLET ORAL
Qty: 0 | Refills: 0 | Status: DISCONTINUED | OUTPATIENT
Start: 2017-01-01 | End: 2017-01-01

## 2017-01-01 RX ORDER — AMIODARONE HYDROCHLORIDE 400 MG/1
150 TABLET ORAL ONCE
Qty: 0 | Refills: 0 | Status: COMPLETED | OUTPATIENT
Start: 2017-01-01 | End: 2017-01-01

## 2017-01-01 RX ORDER — SODIUM CHLORIDE 9 MG/ML
3 INJECTION INTRAMUSCULAR; INTRAVENOUS; SUBCUTANEOUS ONCE
Qty: 0 | Refills: 0 | Status: COMPLETED | OUTPATIENT
Start: 2017-01-01 | End: 2017-01-01

## 2017-01-01 RX ORDER — CEFAZOLIN SODIUM 1 G
2000 VIAL (EA) INJECTION EVERY 8 HOURS
Qty: 0 | Refills: 0 | Status: COMPLETED | OUTPATIENT
Start: 2017-01-01 | End: 2017-01-01

## 2017-01-01 RX ORDER — HYDROMORPHONE HYDROCHLORIDE 2 MG/ML
1 INJECTION INTRAMUSCULAR; INTRAVENOUS; SUBCUTANEOUS
Qty: 0 | Refills: 0 | Status: DISCONTINUED | OUTPATIENT
Start: 2017-01-01 | End: 2017-01-01

## 2017-01-01 RX ORDER — SODIUM CHLORIDE 9 MG/ML
2000 INJECTION INTRAMUSCULAR; INTRAVENOUS; SUBCUTANEOUS ONCE
Qty: 0 | Refills: 0 | Status: COMPLETED | OUTPATIENT
Start: 2017-01-01 | End: 2017-01-01

## 2017-01-01 RX ORDER — POTASSIUM CHLORIDE 20 MEQ
40 PACKET (EA) ORAL ONCE
Qty: 0 | Refills: 0 | Status: COMPLETED | OUTPATIENT
Start: 2017-01-01 | End: 2017-01-01

## 2017-01-01 RX ORDER — POTASSIUM PHOSPHATE, MONOBASIC POTASSIUM PHOSPHATE, DIBASIC 236; 224 MG/ML; MG/ML
30 INJECTION, SOLUTION INTRAVENOUS ONCE
Qty: 0 | Refills: 0 | Status: DISCONTINUED | OUTPATIENT
Start: 2017-01-01 | End: 2017-01-01

## 2017-01-01 RX ORDER — FUROSEMIDE 40 MG
20 TABLET ORAL ONCE
Qty: 0 | Refills: 0 | Status: COMPLETED | OUTPATIENT
Start: 2017-01-01 | End: 2017-01-01

## 2017-01-01 RX ORDER — NOREPINEPHRINE BITARTRATE/D5W 8 MG/250ML
0.5 PLASTIC BAG, INJECTION (ML) INTRAVENOUS
Qty: 16 | Refills: 0 | Status: DISCONTINUED | OUTPATIENT
Start: 2017-01-01 | End: 2017-01-01

## 2017-01-01 RX ORDER — FENTANYL CITRATE 50 UG/ML
25 INJECTION INTRAVENOUS EVERY 8 HOURS
Qty: 0 | Refills: 0 | Status: DISCONTINUED | OUTPATIENT
Start: 2017-01-01 | End: 2017-01-01

## 2017-01-01 RX ORDER — VANCOMYCIN HCL 1 G
1000 VIAL (EA) INTRAVENOUS ONCE
Qty: 0 | Refills: 0 | Status: COMPLETED | OUTPATIENT
Start: 2017-01-01 | End: 2017-01-01

## 2017-01-01 RX ORDER — FUROSEMIDE 40 MG
40 TABLET ORAL DAILY
Qty: 0 | Refills: 0 | Status: DISCONTINUED | OUTPATIENT
Start: 2017-01-01 | End: 2017-01-01

## 2017-01-01 RX ORDER — ACETAZOLAMIDE 250 MG/1
250 TABLET ORAL EVERY 12 HOURS
Qty: 0 | Refills: 0 | Status: DISCONTINUED | OUTPATIENT
Start: 2017-01-01 | End: 2017-01-01

## 2017-01-01 RX ORDER — POTASSIUM PHOSPHATE, MONOBASIC POTASSIUM PHOSPHATE, DIBASIC 236; 224 MG/ML; MG/ML
15 INJECTION, SOLUTION INTRAVENOUS EVERY 6 HOURS
Qty: 0 | Refills: 0 | Status: COMPLETED | OUTPATIENT
Start: 2017-01-01 | End: 2017-01-01

## 2017-01-01 RX ORDER — POTASSIUM PHOSPHATE, MONOBASIC POTASSIUM PHOSPHATE, DIBASIC 236; 224 MG/ML; MG/ML
15 INJECTION, SOLUTION INTRAVENOUS EVERY 6 HOURS
Qty: 0 | Refills: 0 | Status: DISCONTINUED | OUTPATIENT
Start: 2017-01-01 | End: 2017-01-01

## 2017-01-01 RX ORDER — ASCORBIC ACID 60 MG
500 TABLET,CHEWABLE ORAL DAILY
Qty: 0 | Refills: 0 | Status: DISCONTINUED | OUTPATIENT
Start: 2017-01-01 | End: 2017-01-01

## 2017-01-01 RX ORDER — SODIUM CHLORIDE 9 MG/ML
250 INJECTION, SOLUTION INTRAVENOUS
Qty: 0 | Refills: 0 | Status: COMPLETED | OUTPATIENT
Start: 2017-01-01 | End: 2017-01-01

## 2017-01-01 RX ORDER — SODIUM,POTASSIUM PHOSPHATES 278-250MG
1 POWDER IN PACKET (EA) ORAL
Qty: 0 | Refills: 0 | Status: DISCONTINUED | OUTPATIENT
Start: 2017-01-01 | End: 2017-01-01

## 2017-01-01 RX ORDER — DOCUSATE SODIUM 100 MG
1 CAPSULE ORAL
Qty: 0 | Refills: 0 | COMMUNITY
Start: 2017-01-01

## 2017-01-01 RX ORDER — VANCOMYCIN HCL 1 G
1000 VIAL (EA) INTRAVENOUS EVERY 12 HOURS
Qty: 0 | Refills: 0 | Status: DISCONTINUED | OUTPATIENT
Start: 2017-01-01 | End: 2017-01-01

## 2017-01-01 RX ORDER — VASOPRESSIN 20 [USP'U]/ML
0.04 INJECTION INTRAVENOUS
Qty: 100 | Refills: 0 | Status: DISCONTINUED | OUTPATIENT
Start: 2017-01-01 | End: 2017-01-01

## 2017-01-01 RX ORDER — ALBUTEROL 90 UG/1
1 AEROSOL, METERED ORAL EVERY 4 HOURS
Qty: 0 | Refills: 0 | Status: DISCONTINUED | OUTPATIENT
Start: 2017-01-01 | End: 2017-01-01

## 2017-01-01 RX ORDER — ONDANSETRON 8 MG/1
4 TABLET, FILM COATED ORAL ONCE
Qty: 0 | Refills: 0 | Status: COMPLETED | OUTPATIENT
Start: 2017-01-01 | End: 2017-01-01

## 2017-01-01 RX ORDER — FOLIC ACID 0.8 MG
1 TABLET ORAL
Qty: 0 | Refills: 0 | COMMUNITY
Start: 2017-01-01

## 2017-01-01 RX ORDER — DEXMEDETOMIDINE HYDROCHLORIDE IN 0.9% SODIUM CHLORIDE 4 UG/ML
0.2 INJECTION INTRAVENOUS
Qty: 200 | Refills: 0 | Status: DISCONTINUED | OUTPATIENT
Start: 2017-01-01 | End: 2017-01-01

## 2017-01-01 RX ORDER — NOREPINEPHRINE BITARTRATE/D5W 8 MG/250ML
0.3 PLASTIC BAG, INJECTION (ML) INTRAVENOUS
Qty: 16 | Refills: 0 | Status: DISCONTINUED | OUTPATIENT
Start: 2017-01-01 | End: 2017-01-01

## 2017-01-01 RX ORDER — ATORVASTATIN CALCIUM 80 MG/1
40 TABLET, FILM COATED ORAL AT BEDTIME
Qty: 0 | Refills: 0 | Status: DISCONTINUED | OUTPATIENT
Start: 2017-01-01 | End: 2017-01-01

## 2017-01-01 RX ORDER — ERGOCALCIFEROL 1.25 MG/1
50000 CAPSULE ORAL
Qty: 0 | Refills: 0 | Status: DISCONTINUED | OUTPATIENT
Start: 2017-01-01 | End: 2017-01-01

## 2017-01-01 RX ORDER — PROPOFOL 10 MG/ML
5 INJECTION, EMULSION INTRAVENOUS
Qty: 1000 | Refills: 0 | Status: DISCONTINUED | OUTPATIENT
Start: 2017-01-01 | End: 2017-01-01

## 2017-01-01 RX ORDER — MORPHINE SULFATE 50 MG/1
2 CAPSULE, EXTENDED RELEASE ORAL
Qty: 0 | Refills: 0 | Status: DISCONTINUED | OUTPATIENT
Start: 2017-01-01 | End: 2017-01-01

## 2017-01-01 RX ORDER — IPRATROPIUM/ALBUTEROL SULFATE 18-103MCG
3 AEROSOL WITH ADAPTER (GRAM) INHALATION EVERY 6 HOURS
Qty: 0 | Refills: 0 | Status: DISCONTINUED | OUTPATIENT
Start: 2017-01-01 | End: 2017-01-01

## 2017-01-01 RX ORDER — PHENYLEPHRINE HYDROCHLORIDE 10 MG/ML
0.5 INJECTION INTRAVENOUS
Qty: 160 | Refills: 0 | Status: DISCONTINUED | OUTPATIENT
Start: 2017-01-01 | End: 2017-01-01

## 2017-01-01 RX ORDER — DEXTROSE 50 % IN WATER 50 %
12.5 SYRINGE (ML) INTRAVENOUS ONCE
Qty: 0 | Refills: 0 | Status: DISCONTINUED | OUTPATIENT
Start: 2017-01-01 | End: 2017-01-01

## 2017-01-01 RX ORDER — DEXMEDETOMIDINE HYDROCHLORIDE IN 0.9% SODIUM CHLORIDE 4 UG/ML
0.5 INJECTION INTRAVENOUS
Qty: 200 | Refills: 0 | Status: DISCONTINUED | OUTPATIENT
Start: 2017-01-01 | End: 2017-01-01

## 2017-01-01 RX ORDER — ONDANSETRON 8 MG/1
4 TABLET, FILM COATED ORAL ONCE
Qty: 0 | Refills: 0 | Status: DISCONTINUED | OUTPATIENT
Start: 2017-01-01 | End: 2017-01-01

## 2017-01-01 RX ORDER — FERROUS SULFATE 325(65) MG
1 TABLET ORAL
Qty: 90 | Refills: 0 | OUTPATIENT
Start: 2017-01-01 | End: 2017-12-13

## 2017-01-01 RX ORDER — INSULIN LISPRO 100/ML
VIAL (ML) SUBCUTANEOUS
Qty: 0 | Refills: 0 | Status: DISCONTINUED | OUTPATIENT
Start: 2017-01-01 | End: 2017-01-01

## 2017-01-01 RX ORDER — AMIODARONE HYDROCHLORIDE 400 MG/1
150 TABLET ORAL ONCE
Qty: 0 | Refills: 0 | Status: DISCONTINUED | OUTPATIENT
Start: 2017-01-01 | End: 2017-01-01

## 2017-01-01 RX ORDER — MEROPENEM 1 G/30ML
INJECTION INTRAVENOUS
Qty: 0 | Refills: 0 | Status: DISCONTINUED | OUTPATIENT
Start: 2017-01-01 | End: 2017-01-01

## 2017-01-01 RX ORDER — ENOXAPARIN SODIUM 100 MG/ML
60 INJECTION SUBCUTANEOUS ONCE
Qty: 0 | Refills: 0 | Status: DISCONTINUED | OUTPATIENT
Start: 2017-01-01 | End: 2017-01-01

## 2017-01-01 RX ORDER — ACETAMINOPHEN 500 MG
2 TABLET ORAL
Qty: 24 | Refills: 0 | OUTPATIENT
Start: 2017-01-01 | End: 2017-01-01

## 2017-01-01 RX ORDER — CEFTRIAXONE 500 MG/1
INJECTION, POWDER, FOR SOLUTION INTRAMUSCULAR; INTRAVENOUS
Qty: 0 | Refills: 0 | Status: DISCONTINUED | OUTPATIENT
Start: 2017-01-01 | End: 2017-01-01

## 2017-01-01 RX ORDER — FENTANYL CITRATE 50 UG/ML
100 INJECTION INTRAVENOUS
Qty: 0 | Refills: 0 | Status: DISCONTINUED | OUTPATIENT
Start: 2017-01-01 | End: 2017-01-01

## 2017-01-01 RX ADMIN — Medication 1 TABLET(S): at 11:08

## 2017-01-01 RX ADMIN — HYDROMORPHONE HYDROCHLORIDE 1 MILLIGRAM(S): 2 INJECTION INTRAMUSCULAR; INTRAVENOUS; SUBCUTANEOUS at 11:19

## 2017-01-01 RX ADMIN — Medication 81 MILLIGRAM(S): at 12:29

## 2017-01-01 RX ADMIN — DEXMEDETOMIDINE HYDROCHLORIDE IN 0.9% SODIUM CHLORIDE 3 MICROGRAM(S)/KG/HR: 4 INJECTION INTRAVENOUS at 20:00

## 2017-01-01 RX ADMIN — Medication 500 MILLIGRAM(S): at 06:11

## 2017-01-01 RX ADMIN — HEPARIN SODIUM 5000 UNIT(S): 5000 INJECTION INTRAVENOUS; SUBCUTANEOUS at 06:47

## 2017-01-01 RX ADMIN — Medication 2 MILLIGRAM(S): at 00:12

## 2017-01-01 RX ADMIN — HYDROMORPHONE HYDROCHLORIDE 1 MILLIGRAM(S): 2 INJECTION INTRAMUSCULAR; INTRAVENOUS; SUBCUTANEOUS at 16:00

## 2017-01-01 RX ADMIN — Medication 5 MILLIGRAM(S): at 13:33

## 2017-01-01 RX ADMIN — Medication 40 MILLIGRAM(S): at 17:15

## 2017-01-01 RX ADMIN — ACETAZOLAMIDE 105 MILLIGRAM(S): 250 TABLET ORAL at 05:03

## 2017-01-01 RX ADMIN — DEXMEDETOMIDINE HYDROCHLORIDE IN 0.9% SODIUM CHLORIDE 3 MICROGRAM(S)/KG/HR: 4 INJECTION INTRAVENOUS at 08:27

## 2017-01-01 RX ADMIN — DEXMEDETOMIDINE HYDROCHLORIDE IN 0.9% SODIUM CHLORIDE 3 MICROGRAM(S)/KG/HR: 4 INJECTION INTRAVENOUS at 19:58

## 2017-01-01 RX ADMIN — FENTANYL CITRATE 2.33 MICROGRAM(S)/KG/HR: 50 INJECTION INTRAVENOUS at 22:06

## 2017-01-01 RX ADMIN — FENTANYL CITRATE 25 MICROGRAM(S): 50 INJECTION INTRAVENOUS at 02:58

## 2017-01-01 RX ADMIN — Medication 1 MILLIGRAM(S): at 11:41

## 2017-01-01 RX ADMIN — Medication 21.89 MICROGRAM(S)/KG/MIN: at 15:37

## 2017-01-01 RX ADMIN — HEPARIN SODIUM 5000 UNIT(S): 5000 INJECTION INTRAVENOUS; SUBCUTANEOUS at 21:48

## 2017-01-01 RX ADMIN — Medication 100 MILLIGRAM(S): at 22:30

## 2017-01-01 RX ADMIN — FENTANYL CITRATE 50 MICROGRAM(S): 50 INJECTION INTRAVENOUS at 15:15

## 2017-01-01 RX ADMIN — PROPOFOL 1.4 MICROGRAM(S)/KG/MIN: 10 INJECTION, EMULSION INTRAVENOUS at 20:22

## 2017-01-01 RX ADMIN — Medication 1 TABLET(S): at 17:02

## 2017-01-01 RX ADMIN — QUETIAPINE FUMARATE 25 MILLIGRAM(S): 200 TABLET, FILM COATED ORAL at 00:17

## 2017-01-01 RX ADMIN — DEXMEDETOMIDINE HYDROCHLORIDE IN 0.9% SODIUM CHLORIDE 3 MICROGRAM(S)/KG/HR: 4 INJECTION INTRAVENOUS at 09:17

## 2017-01-01 RX ADMIN — Medication 325 MILLIGRAM(S): at 12:26

## 2017-01-01 RX ADMIN — PANTOPRAZOLE SODIUM 40 MILLIGRAM(S): 20 TABLET, DELAYED RELEASE ORAL at 11:04

## 2017-01-01 RX ADMIN — Medication 500 MILLIGRAM(S): at 11:33

## 2017-01-01 RX ADMIN — Medication 81 MILLIGRAM(S): at 12:34

## 2017-01-01 RX ADMIN — DEXMEDETOMIDINE HYDROCHLORIDE IN 0.9% SODIUM CHLORIDE 3 MICROGRAM(S)/KG/HR: 4 INJECTION INTRAVENOUS at 07:29

## 2017-01-01 RX ADMIN — MEROPENEM 100 MILLIGRAM(S): 1 INJECTION INTRAVENOUS at 05:54

## 2017-01-01 RX ADMIN — Medication 1000 MILLIGRAM(S): at 14:37

## 2017-01-01 RX ADMIN — SODIUM CHLORIDE 100 MILLILITER(S): 9 INJECTION, SOLUTION INTRAVENOUS at 00:11

## 2017-01-01 RX ADMIN — Medication 300 MILLIGRAM(S): at 11:58

## 2017-01-01 RX ADMIN — ENOXAPARIN SODIUM 40 MILLIGRAM(S): 100 INJECTION SUBCUTANEOUS at 12:45

## 2017-01-01 RX ADMIN — Medication 10 MILLIGRAM(S): at 18:25

## 2017-01-01 RX ADMIN — Medication 100 MILLIGRAM(S): at 14:07

## 2017-01-01 RX ADMIN — PANTOPRAZOLE SODIUM 40 MILLIGRAM(S): 20 TABLET, DELAYED RELEASE ORAL at 12:37

## 2017-01-01 RX ADMIN — FENTANYL CITRATE 100 MICROGRAM(S): 50 INJECTION INTRAVENOUS at 13:38

## 2017-01-01 RX ADMIN — Medication 1 MILLIGRAM(S): at 11:58

## 2017-01-01 RX ADMIN — HEPARIN SODIUM 5000 UNIT(S): 5000 INJECTION INTRAVENOUS; SUBCUTANEOUS at 05:56

## 2017-01-01 RX ADMIN — HYDROMORPHONE HYDROCHLORIDE 2 MILLIGRAM(S): 2 INJECTION INTRAMUSCULAR; INTRAVENOUS; SUBCUTANEOUS at 12:30

## 2017-01-01 RX ADMIN — SODIUM CHLORIDE 100 MILLILITER(S): 9 INJECTION, SOLUTION INTRAVENOUS at 08:03

## 2017-01-01 RX ADMIN — DEXMEDETOMIDINE HYDROCHLORIDE IN 0.9% SODIUM CHLORIDE 3 MICROGRAM(S)/KG/HR: 4 INJECTION INTRAVENOUS at 18:13

## 2017-01-01 RX ADMIN — MEROPENEM 100 MILLIGRAM(S): 1 INJECTION INTRAVENOUS at 00:57

## 2017-01-01 RX ADMIN — Medication 100 MILLIEQUIVALENT(S): at 11:25

## 2017-01-01 RX ADMIN — CEFTRIAXONE 100 GRAM(S): 500 INJECTION, POWDER, FOR SOLUTION INTRAMUSCULAR; INTRAVENOUS at 18:29

## 2017-01-01 RX ADMIN — PIPERACILLIN AND TAZOBACTAM 200 GRAM(S): 4; .5 INJECTION, POWDER, LYOPHILIZED, FOR SOLUTION INTRAVENOUS at 15:56

## 2017-01-01 RX ADMIN — Medication 1 MILLIGRAM(S): at 14:59

## 2017-01-01 RX ADMIN — DEXMEDETOMIDINE HYDROCHLORIDE IN 0.9% SODIUM CHLORIDE 3 MICROGRAM(S)/KG/HR: 4 INJECTION INTRAVENOUS at 10:13

## 2017-01-01 RX ADMIN — FENTANYL CITRATE 50 MICROGRAM(S): 50 INJECTION INTRAVENOUS at 08:29

## 2017-01-01 RX ADMIN — Medication 40 MILLIGRAM(S): at 05:55

## 2017-01-01 RX ADMIN — Medication 1 MILLIGRAM(S): at 17:02

## 2017-01-01 RX ADMIN — CEFTRIAXONE 100 GRAM(S): 500 INJECTION, POWDER, FOR SOLUTION INTRAMUSCULAR; INTRAVENOUS at 18:55

## 2017-01-01 RX ADMIN — Medication 16.88 MICROGRAM(S)/KG/MIN: at 15:12

## 2017-01-01 RX ADMIN — DEXMEDETOMIDINE HYDROCHLORIDE IN 0.9% SODIUM CHLORIDE 3 MICROGRAM(S)/KG/HR: 4 INJECTION INTRAVENOUS at 07:55

## 2017-01-01 RX ADMIN — Medication 1 MILLIGRAM(S): at 20:19

## 2017-01-01 RX ADMIN — MORPHINE SULFATE 2 MILLIGRAM(S): 50 CAPSULE, EXTENDED RELEASE ORAL at 09:25

## 2017-01-01 RX ADMIN — MEROPENEM 100 MILLIGRAM(S): 1 INJECTION INTRAVENOUS at 13:24

## 2017-01-01 RX ADMIN — Medication 10 MILLIGRAM(S): at 01:58

## 2017-01-01 RX ADMIN — DEXMEDETOMIDINE HYDROCHLORIDE IN 0.9% SODIUM CHLORIDE 3 MICROGRAM(S)/KG/HR: 4 INJECTION INTRAVENOUS at 04:28

## 2017-01-01 RX ADMIN — MORPHINE SULFATE 2 MILLIGRAM(S): 50 CAPSULE, EXTENDED RELEASE ORAL at 06:38

## 2017-01-01 RX ADMIN — Medication 500 MILLIGRAM(S): at 17:32

## 2017-01-01 RX ADMIN — Medication 1 TABLET(S): at 12:37

## 2017-01-01 RX ADMIN — SODIUM CHLORIDE 1000 MILLILITER(S): 9 INJECTION INTRAMUSCULAR; INTRAVENOUS; SUBCUTANEOUS at 01:38

## 2017-01-01 RX ADMIN — FENTANYL CITRATE 25 MICROGRAM(S): 50 INJECTION INTRAVENOUS at 02:06

## 2017-01-01 RX ADMIN — MORPHINE SULFATE 2 MILLIGRAM(S): 50 CAPSULE, EXTENDED RELEASE ORAL at 06:59

## 2017-01-01 RX ADMIN — FENTANYL CITRATE 25 MICROGRAM(S): 50 INJECTION INTRAVENOUS at 11:13

## 2017-01-01 RX ADMIN — Medication 1000 MILLIGRAM(S): at 18:30

## 2017-01-01 RX ADMIN — HEPARIN SODIUM 5000 UNIT(S): 5000 INJECTION INTRAVENOUS; SUBCUTANEOUS at 05:16

## 2017-01-01 RX ADMIN — QUETIAPINE FUMARATE 25 MILLIGRAM(S): 200 TABLET, FILM COATED ORAL at 21:49

## 2017-01-01 RX ADMIN — Medication 325 MILLIGRAM(S): at 08:34

## 2017-01-01 RX ADMIN — FENTANYL CITRATE 100 MICROGRAM(S): 50 INJECTION INTRAVENOUS at 23:42

## 2017-01-01 RX ADMIN — AMIODARONE HYDROCHLORIDE 600 MILLIGRAM(S): 400 TABLET ORAL at 18:50

## 2017-01-01 RX ADMIN — MORPHINE SULFATE 4 MILLIGRAM(S): 50 CAPSULE, EXTENDED RELEASE ORAL at 01:47

## 2017-01-01 RX ADMIN — SODIUM CHLORIDE 1000 MILLILITER(S): 9 INJECTION INTRAMUSCULAR; INTRAVENOUS; SUBCUTANEOUS at 00:26

## 2017-01-01 RX ADMIN — Medication 50 MILLIGRAM(S): at 11:08

## 2017-01-01 RX ADMIN — HYDROMORPHONE HYDROCHLORIDE 2 MILLIGRAM(S): 2 INJECTION INTRAMUSCULAR; INTRAVENOUS; SUBCUTANEOUS at 20:56

## 2017-01-01 RX ADMIN — Medication 1 MILLIGRAM(S): at 12:27

## 2017-01-01 RX ADMIN — Medication 250 MILLIGRAM(S): at 06:31

## 2017-01-01 RX ADMIN — Medication 500 MILLIGRAM(S): at 17:55

## 2017-01-01 RX ADMIN — HEPARIN SODIUM 5000 UNIT(S): 5000 INJECTION INTRAVENOUS; SUBCUTANEOUS at 05:37

## 2017-01-01 RX ADMIN — CEFTRIAXONE 100 GRAM(S): 500 INJECTION, POWDER, FOR SOLUTION INTRAMUSCULAR; INTRAVENOUS at 20:07

## 2017-01-01 RX ADMIN — PANTOPRAZOLE SODIUM 40 MILLIGRAM(S): 20 TABLET, DELAYED RELEASE ORAL at 13:01

## 2017-01-01 RX ADMIN — ATORVASTATIN CALCIUM 40 MILLIGRAM(S): 80 TABLET, FILM COATED ORAL at 22:02

## 2017-01-01 RX ADMIN — HYDROMORPHONE HYDROCHLORIDE 1 MILLIGRAM(S): 2 INJECTION INTRAMUSCULAR; INTRAVENOUS; SUBCUTANEOUS at 18:15

## 2017-01-01 RX ADMIN — FENTANYL CITRATE 50 MICROGRAM(S): 50 INJECTION INTRAVENOUS at 02:28

## 2017-01-01 RX ADMIN — Medication 100 MILLIGRAM(S): at 21:06

## 2017-01-01 RX ADMIN — HYDROMORPHONE HYDROCHLORIDE 2 MILLIGRAM(S): 2 INJECTION INTRAMUSCULAR; INTRAVENOUS; SUBCUTANEOUS at 14:30

## 2017-01-01 RX ADMIN — Medication 300 MILLIGRAM(S): at 12:34

## 2017-01-01 RX ADMIN — HYDROMORPHONE HYDROCHLORIDE 2 MILLIGRAM(S): 2 INJECTION INTRAMUSCULAR; INTRAVENOUS; SUBCUTANEOUS at 08:00

## 2017-01-01 RX ADMIN — Medication 300 MILLIGRAM(S): at 17:02

## 2017-01-01 RX ADMIN — DEXMEDETOMIDINE HYDROCHLORIDE IN 0.9% SODIUM CHLORIDE 3 MICROGRAM(S)/KG/HR: 4 INJECTION INTRAVENOUS at 11:28

## 2017-01-01 RX ADMIN — FENTANYL CITRATE 2.33 MICROGRAM(S)/KG/HR: 50 INJECTION INTRAVENOUS at 16:04

## 2017-01-01 RX ADMIN — Medication 1 MILLIGRAM(S): at 12:38

## 2017-01-01 RX ADMIN — Medication 650 MILLIGRAM(S): at 10:15

## 2017-01-01 RX ADMIN — HEPARIN SODIUM 5000 UNIT(S): 5000 INJECTION INTRAVENOUS; SUBCUTANEOUS at 05:07

## 2017-01-01 RX ADMIN — Medication 325 MILLIGRAM(S): at 11:08

## 2017-01-01 RX ADMIN — Medication 50 MILLIGRAM(S): at 11:59

## 2017-01-01 RX ADMIN — PIPERACILLIN AND TAZOBACTAM 12.5 GRAM(S): 4; .5 INJECTION, POWDER, LYOPHILIZED, FOR SOLUTION INTRAVENOUS at 06:47

## 2017-01-01 RX ADMIN — HEPARIN SODIUM 5000 UNIT(S): 5000 INJECTION INTRAVENOUS; SUBCUTANEOUS at 15:03

## 2017-01-01 RX ADMIN — DEXMEDETOMIDINE HYDROCHLORIDE IN 0.9% SODIUM CHLORIDE 3 MICROGRAM(S)/KG/HR: 4 INJECTION INTRAVENOUS at 13:11

## 2017-01-01 RX ADMIN — Medication 100 MILLIEQUIVALENT(S): at 09:09

## 2017-01-01 RX ADMIN — Medication 50 MILLIGRAM(S): at 12:37

## 2017-01-01 RX ADMIN — FENTANYL CITRATE 100 MICROGRAM(S): 50 INJECTION INTRAVENOUS at 15:38

## 2017-01-01 RX ADMIN — PROPOFOL 1.4 MICROGRAM(S)/KG/MIN: 10 INJECTION, EMULSION INTRAVENOUS at 06:32

## 2017-01-01 RX ADMIN — HYDROMORPHONE HYDROCHLORIDE 2 MILLIGRAM(S): 2 INJECTION INTRAMUSCULAR; INTRAVENOUS; SUBCUTANEOUS at 21:17

## 2017-01-01 RX ADMIN — DEXMEDETOMIDINE HYDROCHLORIDE IN 0.9% SODIUM CHLORIDE 3 MICROGRAM(S)/KG/HR: 4 INJECTION INTRAVENOUS at 08:11

## 2017-01-01 RX ADMIN — HYDROMORPHONE HYDROCHLORIDE 2 MILLIGRAM(S): 2 INJECTION INTRAMUSCULAR; INTRAVENOUS; SUBCUTANEOUS at 07:44

## 2017-01-01 RX ADMIN — DEXMEDETOMIDINE HYDROCHLORIDE IN 0.9% SODIUM CHLORIDE 3 MICROGRAM(S)/KG/HR: 4 INJECTION INTRAVENOUS at 02:51

## 2017-01-01 RX ADMIN — HEPARIN SODIUM 5000 UNIT(S): 5000 INJECTION INTRAVENOUS; SUBCUTANEOUS at 23:40

## 2017-01-01 RX ADMIN — Medication 100 MILLIGRAM(S): at 06:11

## 2017-01-01 RX ADMIN — ENOXAPARIN SODIUM 40 MILLIGRAM(S): 100 INJECTION SUBCUTANEOUS at 11:41

## 2017-01-01 RX ADMIN — MEROPENEM 100 MILLIGRAM(S): 1 INJECTION INTRAVENOUS at 21:54

## 2017-01-01 RX ADMIN — Medication 1 TABLET(S): at 17:32

## 2017-01-01 RX ADMIN — HYDROMORPHONE HYDROCHLORIDE 1 MILLIGRAM(S): 2 INJECTION INTRAMUSCULAR; INTRAVENOUS; SUBCUTANEOUS at 07:15

## 2017-01-01 RX ADMIN — FENTANYL CITRATE 100 MICROGRAM(S): 50 INJECTION INTRAVENOUS at 00:58

## 2017-01-01 RX ADMIN — Medication 650 MILLIGRAM(S): at 11:03

## 2017-01-01 RX ADMIN — Medication 100 MILLIGRAM(S): at 05:29

## 2017-01-01 RX ADMIN — Medication 100 MILLIGRAM(S): at 21:51

## 2017-01-01 RX ADMIN — DEXMEDETOMIDINE HYDROCHLORIDE IN 0.9% SODIUM CHLORIDE 3 MICROGRAM(S)/KG/HR: 4 INJECTION INTRAVENOUS at 21:00

## 2017-01-01 RX ADMIN — QUETIAPINE FUMARATE 25 MILLIGRAM(S): 200 TABLET, FILM COATED ORAL at 21:07

## 2017-01-01 RX ADMIN — FENTANYL CITRATE 100 MICROGRAM(S): 50 INJECTION INTRAVENOUS at 15:59

## 2017-01-01 RX ADMIN — Medication 1 TABLET(S): at 14:58

## 2017-01-01 RX ADMIN — DEXMEDETOMIDINE HYDROCHLORIDE IN 0.9% SODIUM CHLORIDE 3 MICROGRAM(S)/KG/HR: 4 INJECTION INTRAVENOUS at 05:56

## 2017-01-01 RX ADMIN — HEPARIN SODIUM 5000 UNIT(S): 5000 INJECTION INTRAVENOUS; SUBCUTANEOUS at 22:39

## 2017-01-01 RX ADMIN — Medication 250 MILLIGRAM(S): at 05:52

## 2017-01-01 RX ADMIN — SODIUM CHLORIDE 1000 MILLILITER(S): 9 INJECTION, SOLUTION INTRAVENOUS at 19:20

## 2017-01-01 RX ADMIN — Medication 500 MILLIGRAM(S): at 06:41

## 2017-01-01 RX ADMIN — Medication 325 MILLIGRAM(S): at 08:20

## 2017-01-01 RX ADMIN — DEXMEDETOMIDINE HYDROCHLORIDE IN 0.9% SODIUM CHLORIDE 3 MICROGRAM(S)/KG/HR: 4 INJECTION INTRAVENOUS at 16:43

## 2017-01-01 RX ADMIN — Medication 500 MILLIGRAM(S): at 17:14

## 2017-01-01 RX ADMIN — FENTANYL CITRATE 100 MICROGRAM(S): 50 INJECTION INTRAVENOUS at 16:30

## 2017-01-01 RX ADMIN — ATORVASTATIN CALCIUM 40 MILLIGRAM(S): 80 TABLET, FILM COATED ORAL at 21:11

## 2017-01-01 RX ADMIN — MEROPENEM 100 MILLIGRAM(S): 1 INJECTION INTRAVENOUS at 05:03

## 2017-01-01 RX ADMIN — HEPARIN SODIUM 5000 UNIT(S): 5000 INJECTION INTRAVENOUS; SUBCUTANEOUS at 15:30

## 2017-01-01 RX ADMIN — Medication 200 GRAM(S): at 10:41

## 2017-01-01 RX ADMIN — Medication 500 MILLIGRAM(S): at 05:29

## 2017-01-01 RX ADMIN — SODIUM CHLORIDE 2000 MILLILITER(S): 9 INJECTION, SOLUTION INTRAVENOUS at 10:43

## 2017-01-01 RX ADMIN — HYDROMORPHONE HYDROCHLORIDE 0.5 MILLIGRAM(S): 2 INJECTION INTRAMUSCULAR; INTRAVENOUS; SUBCUTANEOUS at 15:31

## 2017-01-01 RX ADMIN — HYDROMORPHONE HYDROCHLORIDE 1 MILLIGRAM(S): 2 INJECTION INTRAMUSCULAR; INTRAVENOUS; SUBCUTANEOUS at 17:22

## 2017-01-01 RX ADMIN — FENTANYL CITRATE 2.33 MICROGRAM(S)/KG/HR: 50 INJECTION INTRAVENOUS at 17:49

## 2017-01-01 RX ADMIN — DEXMEDETOMIDINE HYDROCHLORIDE IN 0.9% SODIUM CHLORIDE 3 MICROGRAM(S)/KG/HR: 4 INJECTION INTRAVENOUS at 13:30

## 2017-01-01 RX ADMIN — FENTANYL CITRATE 2.33 MICROGRAM(S)/KG/HR: 50 INJECTION INTRAVENOUS at 15:43

## 2017-01-01 RX ADMIN — PANTOPRAZOLE SODIUM 40 MILLIGRAM(S): 20 TABLET, DELAYED RELEASE ORAL at 11:59

## 2017-01-01 RX ADMIN — Medication 50 MILLILITER(S): at 18:00

## 2017-01-01 RX ADMIN — ATORVASTATIN CALCIUM 40 MILLIGRAM(S): 80 TABLET, FILM COATED ORAL at 22:06

## 2017-01-01 RX ADMIN — DEXMEDETOMIDINE HYDROCHLORIDE IN 0.9% SODIUM CHLORIDE 3 MICROGRAM(S)/KG/HR: 4 INJECTION INTRAVENOUS at 13:24

## 2017-01-01 RX ADMIN — Medication 250 MILLIGRAM(S): at 17:23

## 2017-01-01 RX ADMIN — HEPARIN SODIUM 5000 UNIT(S): 5000 INJECTION INTRAVENOUS; SUBCUTANEOUS at 13:12

## 2017-01-01 RX ADMIN — POTASSIUM PHOSPHATE, MONOBASIC POTASSIUM PHOSPHATE, DIBASIC 62.5 MILLIMOLE(S): 236; 224 INJECTION, SOLUTION INTRAVENOUS at 00:06

## 2017-01-01 RX ADMIN — Medication 15 MILLIGRAM(S): at 23:01

## 2017-01-01 RX ADMIN — DEXMEDETOMIDINE HYDROCHLORIDE IN 0.9% SODIUM CHLORIDE 3 MICROGRAM(S)/KG/HR: 4 INJECTION INTRAVENOUS at 14:11

## 2017-01-01 RX ADMIN — SODIUM CHLORIDE 100 MILLILITER(S): 9 INJECTION, SOLUTION INTRAVENOUS at 12:07

## 2017-01-01 RX ADMIN — ATORVASTATIN CALCIUM 40 MILLIGRAM(S): 80 TABLET, FILM COATED ORAL at 21:38

## 2017-01-01 RX ADMIN — DEXMEDETOMIDINE HYDROCHLORIDE IN 0.9% SODIUM CHLORIDE 3 MICROGRAM(S)/KG/HR: 4 INJECTION INTRAVENOUS at 01:49

## 2017-01-01 RX ADMIN — Medication 50 MILLILITER(S): at 10:13

## 2017-01-01 RX ADMIN — DEXMEDETOMIDINE HYDROCHLORIDE IN 0.9% SODIUM CHLORIDE 3 MICROGRAM(S)/KG/HR: 4 INJECTION INTRAVENOUS at 04:22

## 2017-01-01 RX ADMIN — HEPARIN SODIUM 5000 UNIT(S): 5000 INJECTION INTRAVENOUS; SUBCUTANEOUS at 14:00

## 2017-01-01 RX ADMIN — ATORVASTATIN CALCIUM 40 MILLIGRAM(S): 80 TABLET, FILM COATED ORAL at 21:20

## 2017-01-01 RX ADMIN — HEPARIN SODIUM 5000 UNIT(S): 5000 INJECTION INTRAVENOUS; SUBCUTANEOUS at 05:53

## 2017-01-01 RX ADMIN — DEXMEDETOMIDINE HYDROCHLORIDE IN 0.9% SODIUM CHLORIDE 3 MICROGRAM(S)/KG/HR: 4 INJECTION INTRAVENOUS at 11:36

## 2017-01-01 RX ADMIN — DEXMEDETOMIDINE HYDROCHLORIDE IN 0.9% SODIUM CHLORIDE 3 MICROGRAM(S)/KG/HR: 4 INJECTION INTRAVENOUS at 04:56

## 2017-01-01 RX ADMIN — Medication 300 MILLIGRAM(S): at 11:04

## 2017-01-01 RX ADMIN — Medication 50 MILLIGRAM(S): at 15:10

## 2017-01-01 RX ADMIN — Medication 50 GRAM(S): at 11:48

## 2017-01-01 RX ADMIN — HYDROMORPHONE HYDROCHLORIDE 2 MILLIGRAM(S): 2 INJECTION INTRAMUSCULAR; INTRAVENOUS; SUBCUTANEOUS at 20:53

## 2017-01-01 RX ADMIN — HYDROMORPHONE HYDROCHLORIDE 1 MILLIGRAM(S): 2 INJECTION INTRAMUSCULAR; INTRAVENOUS; SUBCUTANEOUS at 13:15

## 2017-01-01 RX ADMIN — Medication 81 MILLIGRAM(S): at 11:59

## 2017-01-01 RX ADMIN — POTASSIUM PHOSPHATE, MONOBASIC POTASSIUM PHOSPHATE, DIBASIC 62.5 MILLIMOLE(S): 236; 224 INJECTION, SOLUTION INTRAVENOUS at 09:58

## 2017-01-01 RX ADMIN — DEXMEDETOMIDINE HYDROCHLORIDE IN 0.9% SODIUM CHLORIDE 3 MICROGRAM(S)/KG/HR: 4 INJECTION INTRAVENOUS at 23:03

## 2017-01-01 RX ADMIN — HYDROMORPHONE HYDROCHLORIDE 1 MILLIGRAM(S): 2 INJECTION INTRAMUSCULAR; INTRAVENOUS; SUBCUTANEOUS at 14:55

## 2017-01-01 RX ADMIN — DEXMEDETOMIDINE HYDROCHLORIDE IN 0.9% SODIUM CHLORIDE 3 MICROGRAM(S)/KG/HR: 4 INJECTION INTRAVENOUS at 05:40

## 2017-01-01 RX ADMIN — POTASSIUM PHOSPHATE, MONOBASIC POTASSIUM PHOSPHATE, DIBASIC 62.5 MILLIMOLE(S): 236; 224 INJECTION, SOLUTION INTRAVENOUS at 14:14

## 2017-01-01 RX ADMIN — HEPARIN SODIUM 5000 UNIT(S): 5000 INJECTION INTRAVENOUS; SUBCUTANEOUS at 21:07

## 2017-01-01 RX ADMIN — DEXMEDETOMIDINE HYDROCHLORIDE IN 0.9% SODIUM CHLORIDE 3 MICROGRAM(S)/KG/HR: 4 INJECTION INTRAVENOUS at 01:19

## 2017-01-01 RX ADMIN — Medication 500 MILLIGRAM(S): at 17:02

## 2017-01-01 RX ADMIN — SODIUM CHLORIDE 2000 MILLILITER(S): 9 INJECTION, SOLUTION INTRAVENOUS at 10:57

## 2017-01-01 RX ADMIN — Medication 650 MILLIGRAM(S): at 12:48

## 2017-01-01 RX ADMIN — HEPARIN SODIUM 5000 UNIT(S): 5000 INJECTION INTRAVENOUS; SUBCUTANEOUS at 14:16

## 2017-01-01 RX ADMIN — FENTANYL CITRATE 14.01 MICROGRAM(S)/KG/HR: 50 INJECTION INTRAVENOUS at 22:38

## 2017-01-01 RX ADMIN — Medication 21.89 MICROGRAM(S)/KG/MIN: at 21:48

## 2017-01-01 RX ADMIN — PROPOFOL 1.8 MICROGRAM(S)/KG/MIN: 10 INJECTION, EMULSION INTRAVENOUS at 17:52

## 2017-01-01 RX ADMIN — DEXMEDETOMIDINE HYDROCHLORIDE IN 0.9% SODIUM CHLORIDE 3 MICROGRAM(S)/KG/HR: 4 INJECTION INTRAVENOUS at 12:44

## 2017-01-01 RX ADMIN — HEPARIN SODIUM 5000 UNIT(S): 5000 INJECTION INTRAVENOUS; SUBCUTANEOUS at 21:38

## 2017-01-01 RX ADMIN — QUETIAPINE FUMARATE 25 MILLIGRAM(S): 200 TABLET, FILM COATED ORAL at 22:39

## 2017-01-01 RX ADMIN — DEXMEDETOMIDINE HYDROCHLORIDE IN 0.9% SODIUM CHLORIDE 3 MICROGRAM(S)/KG/HR: 4 INJECTION INTRAVENOUS at 10:00

## 2017-01-01 RX ADMIN — HYDROMORPHONE HYDROCHLORIDE 1 MILLIGRAM(S): 2 INJECTION INTRAMUSCULAR; INTRAVENOUS; SUBCUTANEOUS at 23:00

## 2017-01-01 RX ADMIN — DEXMEDETOMIDINE HYDROCHLORIDE IN 0.9% SODIUM CHLORIDE 3 MICROGRAM(S)/KG/HR: 4 INJECTION INTRAVENOUS at 06:12

## 2017-01-01 RX ADMIN — Medication 21.89 MICROGRAM(S)/KG/MIN: at 09:00

## 2017-01-01 RX ADMIN — Medication 650 MILLIGRAM(S): at 19:37

## 2017-01-01 RX ADMIN — SODIUM CHLORIDE 3 MILLILITER(S): 9 INJECTION INTRAMUSCULAR; INTRAVENOUS; SUBCUTANEOUS at 00:23

## 2017-01-01 RX ADMIN — DEXMEDETOMIDINE HYDROCHLORIDE IN 0.9% SODIUM CHLORIDE 3 MICROGRAM(S)/KG/HR: 4 INJECTION INTRAVENOUS at 07:15

## 2017-01-01 RX ADMIN — HYDROMORPHONE HYDROCHLORIDE 1 MILLIGRAM(S): 2 INJECTION INTRAMUSCULAR; INTRAVENOUS; SUBCUTANEOUS at 13:03

## 2017-01-01 RX ADMIN — Medication 500 MILLIGRAM(S): at 11:58

## 2017-01-01 RX ADMIN — PHENYLEPHRINE HYDROCHLORIDE 5.62 MICROGRAM(S)/KG/MIN: 10 INJECTION INTRAVENOUS at 17:50

## 2017-01-01 RX ADMIN — Medication 325 MILLIGRAM(S): at 13:42

## 2017-01-01 RX ADMIN — HEPARIN SODIUM 5000 UNIT(S): 5000 INJECTION INTRAVENOUS; SUBCUTANEOUS at 21:20

## 2017-01-01 RX ADMIN — DEXMEDETOMIDINE HYDROCHLORIDE IN 0.9% SODIUM CHLORIDE 3 MICROGRAM(S)/KG/HR: 4 INJECTION INTRAVENOUS at 05:21

## 2017-01-01 RX ADMIN — FENTANYL CITRATE 18.68 MICROGRAM(S)/KG/HR: 50 INJECTION INTRAVENOUS at 02:27

## 2017-01-01 RX ADMIN — Medication 50 MILLIGRAM(S): at 12:26

## 2017-01-01 RX ADMIN — ERGOCALCIFEROL 50000 UNIT(S): 1.25 CAPSULE ORAL at 12:37

## 2017-01-01 RX ADMIN — POTASSIUM PHOSPHATE, MONOBASIC POTASSIUM PHOSPHATE, DIBASIC 62.5 MILLIMOLE(S): 236; 224 INJECTION, SOLUTION INTRAVENOUS at 10:13

## 2017-01-01 RX ADMIN — DEXMEDETOMIDINE HYDROCHLORIDE IN 0.9% SODIUM CHLORIDE 3 MICROGRAM(S)/KG/HR: 4 INJECTION INTRAVENOUS at 20:55

## 2017-01-01 RX ADMIN — HYDROMORPHONE HYDROCHLORIDE 1 MILLIGRAM(S): 2 INJECTION INTRAMUSCULAR; INTRAVENOUS; SUBCUTANEOUS at 15:10

## 2017-01-01 RX ADMIN — Medication 81 MILLIGRAM(S): at 14:59

## 2017-01-01 RX ADMIN — FENTANYL CITRATE 100 MICROGRAM(S): 50 INJECTION INTRAVENOUS at 01:30

## 2017-01-01 RX ADMIN — DEXMEDETOMIDINE HYDROCHLORIDE IN 0.9% SODIUM CHLORIDE 3 MICROGRAM(S)/KG/HR: 4 INJECTION INTRAVENOUS at 17:15

## 2017-01-01 RX ADMIN — DEXMEDETOMIDINE HYDROCHLORIDE IN 0.9% SODIUM CHLORIDE 3 MICROGRAM(S)/KG/HR: 4 INJECTION INTRAVENOUS at 08:56

## 2017-01-01 RX ADMIN — ROBINUL 0.2 MILLIGRAM(S): 0.2 INJECTION INTRAMUSCULAR; INTRAVENOUS at 16:41

## 2017-01-01 RX ADMIN — Medication 1000 MILLIGRAM(S): at 06:00

## 2017-01-01 RX ADMIN — FENTANYL CITRATE 100 MICROGRAM(S): 50 INJECTION INTRAVENOUS at 18:52

## 2017-01-01 RX ADMIN — MORPHINE SULFATE 2 MILLIGRAM(S): 50 CAPSULE, EXTENDED RELEASE ORAL at 14:12

## 2017-01-01 RX ADMIN — FENTANYL CITRATE 50 MICROGRAM(S): 50 INJECTION INTRAVENOUS at 10:26

## 2017-01-01 RX ADMIN — Medication 100 MILLIEQUIVALENT(S): at 10:12

## 2017-01-01 RX ADMIN — FENTANYL CITRATE 25 MICROGRAM(S): 50 INJECTION INTRAVENOUS at 03:00

## 2017-01-01 RX ADMIN — Medication 1 MILLIGRAM(S): at 02:28

## 2017-01-01 RX ADMIN — ERGOCALCIFEROL 50000 UNIT(S): 1.25 CAPSULE ORAL at 11:41

## 2017-01-01 RX ADMIN — Medication 50 MILLIGRAM(S): at 14:59

## 2017-01-01 RX ADMIN — Medication 81 MILLIGRAM(S): at 11:58

## 2017-01-01 RX ADMIN — POTASSIUM PHOSPHATE, MONOBASIC POTASSIUM PHOSPHATE, DIBASIC 62.5 MILLIMOLE(S): 236; 224 INJECTION, SOLUTION INTRAVENOUS at 09:04

## 2017-01-01 RX ADMIN — HYDROMORPHONE HYDROCHLORIDE 1 MILLIGRAM(S): 2 INJECTION INTRAMUSCULAR; INTRAVENOUS; SUBCUTANEOUS at 12:30

## 2017-01-01 RX ADMIN — DEXMEDETOMIDINE HYDROCHLORIDE IN 0.9% SODIUM CHLORIDE 3 MICROGRAM(S)/KG/HR: 4 INJECTION INTRAVENOUS at 17:40

## 2017-01-01 RX ADMIN — HYDROMORPHONE HYDROCHLORIDE 2 MILLIGRAM(S): 2 INJECTION INTRAMUSCULAR; INTRAVENOUS; SUBCUTANEOUS at 01:15

## 2017-01-01 RX ADMIN — HYDROMORPHONE HYDROCHLORIDE 1 MILLIGRAM(S): 2 INJECTION INTRAMUSCULAR; INTRAVENOUS; SUBCUTANEOUS at 23:19

## 2017-01-01 RX ADMIN — Medication 500 MILLIGRAM(S): at 18:27

## 2017-01-01 RX ADMIN — DEXMEDETOMIDINE HYDROCHLORIDE IN 0.9% SODIUM CHLORIDE 3 MICROGRAM(S)/KG/HR: 4 INJECTION INTRAVENOUS at 22:40

## 2017-01-01 RX ADMIN — DEXMEDETOMIDINE HYDROCHLORIDE IN 0.9% SODIUM CHLORIDE 3 MICROGRAM(S)/KG/HR: 4 INJECTION INTRAVENOUS at 15:21

## 2017-01-01 RX ADMIN — HYDROMORPHONE HYDROCHLORIDE 1 MILLIGRAM(S): 2 INJECTION INTRAMUSCULAR; INTRAVENOUS; SUBCUTANEOUS at 06:14

## 2017-01-01 RX ADMIN — Medication 325 MILLIGRAM(S): at 17:40

## 2017-01-01 RX ADMIN — HYDROMORPHONE HYDROCHLORIDE 1 MILLIGRAM(S): 2 INJECTION INTRAMUSCULAR; INTRAVENOUS; SUBCUTANEOUS at 11:39

## 2017-01-01 RX ADMIN — Medication 500 MILLIGRAM(S): at 12:29

## 2017-01-01 RX ADMIN — DEXMEDETOMIDINE HYDROCHLORIDE IN 0.9% SODIUM CHLORIDE 3 MICROGRAM(S)/KG/HR: 4 INJECTION INTRAVENOUS at 21:38

## 2017-01-01 RX ADMIN — ACETAZOLAMIDE 105 MILLIGRAM(S): 250 TABLET ORAL at 17:33

## 2017-01-01 RX ADMIN — Medication 1 TABLET(S): at 11:41

## 2017-01-01 RX ADMIN — Medication 50 MILLIGRAM(S): at 12:57

## 2017-01-01 RX ADMIN — DEXMEDETOMIDINE HYDROCHLORIDE IN 0.9% SODIUM CHLORIDE 3 MICROGRAM(S)/KG/HR: 4 INJECTION INTRAVENOUS at 23:37

## 2017-01-01 RX ADMIN — HEPARIN SODIUM 5000 UNIT(S): 5000 INJECTION INTRAVENOUS; SUBCUTANEOUS at 13:24

## 2017-01-01 RX ADMIN — FENTANYL CITRATE 25 MICROGRAM(S): 50 INJECTION INTRAVENOUS at 18:01

## 2017-01-01 RX ADMIN — Medication 15 MILLIGRAM(S): at 08:25

## 2017-01-01 RX ADMIN — PIPERACILLIN AND TAZOBACTAM 12.5 GRAM(S): 4; .5 INJECTION, POWDER, LYOPHILIZED, FOR SOLUTION INTRAVENOUS at 14:00

## 2017-01-01 RX ADMIN — DEXMEDETOMIDINE HYDROCHLORIDE IN 0.9% SODIUM CHLORIDE 3 MICROGRAM(S)/KG/HR: 4 INJECTION INTRAVENOUS at 21:57

## 2017-01-01 RX ADMIN — DEXMEDETOMIDINE HYDROCHLORIDE IN 0.9% SODIUM CHLORIDE 3 MICROGRAM(S)/KG/HR: 4 INJECTION INTRAVENOUS at 15:54

## 2017-01-01 RX ADMIN — HYDROMORPHONE HYDROCHLORIDE 2 MILLIGRAM(S): 2 INJECTION INTRAMUSCULAR; INTRAVENOUS; SUBCUTANEOUS at 11:58

## 2017-01-01 RX ADMIN — SODIUM CHLORIDE 100 MILLILITER(S): 9 INJECTION, SOLUTION INTRAVENOUS at 06:11

## 2017-01-01 RX ADMIN — Medication 40 MILLIGRAM(S): at 17:00

## 2017-01-01 RX ADMIN — Medication 500 MILLIGRAM(S): at 17:40

## 2017-01-01 RX ADMIN — Medication 50 MILLIGRAM(S): at 11:03

## 2017-01-01 RX ADMIN — FENTANYL CITRATE 100 MICROGRAM(S): 50 INJECTION INTRAVENOUS at 02:16

## 2017-01-01 RX ADMIN — PIPERACILLIN AND TAZOBACTAM 12.5 GRAM(S): 4; .5 INJECTION, POWDER, LYOPHILIZED, FOR SOLUTION INTRAVENOUS at 22:30

## 2017-01-01 RX ADMIN — FENTANYL CITRATE 25 MICROGRAM(S): 50 INJECTION INTRAVENOUS at 06:14

## 2017-01-01 RX ADMIN — ENOXAPARIN SODIUM 60 MILLIGRAM(S): 100 INJECTION SUBCUTANEOUS at 18:13

## 2017-01-01 RX ADMIN — POTASSIUM PHOSPHATE, MONOBASIC POTASSIUM PHOSPHATE, DIBASIC 62.5 MILLIMOLE(S): 236; 224 INJECTION, SOLUTION INTRAVENOUS at 06:00

## 2017-01-01 RX ADMIN — PANTOPRAZOLE SODIUM 40 MILLIGRAM(S): 20 TABLET, DELAYED RELEASE ORAL at 11:58

## 2017-01-01 RX ADMIN — FENTANYL CITRATE 25 MICROGRAM(S): 50 INJECTION INTRAVENOUS at 14:00

## 2017-01-01 RX ADMIN — Medication 50 MILLIGRAM(S): at 13:01

## 2017-01-01 RX ADMIN — PIPERACILLIN AND TAZOBACTAM 12.5 GRAM(S): 4; .5 INJECTION, POWDER, LYOPHILIZED, FOR SOLUTION INTRAVENOUS at 17:21

## 2017-01-01 RX ADMIN — HEPARIN SODIUM 5000 UNIT(S): 5000 INJECTION INTRAVENOUS; SUBCUTANEOUS at 13:10

## 2017-01-01 RX ADMIN — PIPERACILLIN AND TAZOBACTAM 12.5 GRAM(S): 4; .5 INJECTION, POWDER, LYOPHILIZED, FOR SOLUTION INTRAVENOUS at 14:14

## 2017-01-01 RX ADMIN — HYDROMORPHONE HYDROCHLORIDE 1 MILLIGRAM(S): 2 INJECTION INTRAMUSCULAR; INTRAVENOUS; SUBCUTANEOUS at 06:13

## 2017-01-01 RX ADMIN — PANTOPRAZOLE SODIUM 40 MILLIGRAM(S): 20 TABLET, DELAYED RELEASE ORAL at 12:34

## 2017-01-01 RX ADMIN — FENTANYL CITRATE 100 MICROGRAM(S): 50 INJECTION INTRAVENOUS at 05:15

## 2017-01-01 RX ADMIN — Medication 1 TABLET(S): at 12:26

## 2017-01-01 RX ADMIN — Medication 500 MILLIGRAM(S): at 12:04

## 2017-01-01 RX ADMIN — PANTOPRAZOLE SODIUM 40 MILLIGRAM(S): 20 TABLET, DELAYED RELEASE ORAL at 12:39

## 2017-01-01 RX ADMIN — Medication 10 MILLIGRAM(S): at 18:55

## 2017-01-01 RX ADMIN — Medication 325 MILLIGRAM(S): at 17:14

## 2017-01-01 RX ADMIN — Medication 250 MILLIGRAM(S): at 18:00

## 2017-01-01 RX ADMIN — DEXMEDETOMIDINE HYDROCHLORIDE IN 0.9% SODIUM CHLORIDE 3 MICROGRAM(S)/KG/HR: 4 INJECTION INTRAVENOUS at 11:03

## 2017-01-01 RX ADMIN — DEXMEDETOMIDINE HYDROCHLORIDE IN 0.9% SODIUM CHLORIDE 3 MICROGRAM(S)/KG/HR: 4 INJECTION INTRAVENOUS at 01:33

## 2017-01-01 RX ADMIN — Medication 50 GRAM(S): at 10:41

## 2017-01-01 RX ADMIN — HEPARIN SODIUM 5000 UNIT(S): 5000 INJECTION INTRAVENOUS; SUBCUTANEOUS at 05:54

## 2017-01-01 RX ADMIN — SODIUM CHLORIDE 1000 MILLILITER(S): 9 INJECTION, SOLUTION INTRAVENOUS at 09:18

## 2017-01-01 RX ADMIN — Medication 650 MILLIGRAM(S): at 05:50

## 2017-01-01 RX ADMIN — SODIUM CHLORIDE 2000 MILLILITER(S): 9 INJECTION INTRAMUSCULAR; INTRAVENOUS; SUBCUTANEOUS at 23:15

## 2017-01-01 RX ADMIN — FENTANYL CITRATE 100 MICROGRAM(S): 50 INJECTION INTRAVENOUS at 00:10

## 2017-01-01 RX ADMIN — DEXMEDETOMIDINE HYDROCHLORIDE IN 0.9% SODIUM CHLORIDE 3 MICROGRAM(S)/KG/HR: 4 INJECTION INTRAVENOUS at 13:47

## 2017-01-01 RX ADMIN — Medication 1 TABLET(S): at 10:36

## 2017-01-01 RX ADMIN — Medication 100 MILLIGRAM(S): at 05:32

## 2017-01-01 RX ADMIN — Medication 10 MILLIGRAM(S): at 10:18

## 2017-01-01 RX ADMIN — HYDROMORPHONE HYDROCHLORIDE 1 MILLIGRAM(S): 2 INJECTION INTRAMUSCULAR; INTRAVENOUS; SUBCUTANEOUS at 11:04

## 2017-01-01 RX ADMIN — DEXMEDETOMIDINE HYDROCHLORIDE IN 0.9% SODIUM CHLORIDE 3 MICROGRAM(S)/KG/HR: 4 INJECTION INTRAVENOUS at 10:55

## 2017-01-01 RX ADMIN — FENTANYL CITRATE 25 MICROGRAM(S): 50 INJECTION INTRAVENOUS at 02:05

## 2017-01-01 RX ADMIN — FENTANYL CITRATE 25 MICROGRAM(S): 50 INJECTION INTRAVENOUS at 10:59

## 2017-01-01 RX ADMIN — HEPARIN SODIUM 5000 UNIT(S): 5000 INJECTION INTRAVENOUS; SUBCUTANEOUS at 05:21

## 2017-01-01 RX ADMIN — Medication 1000 MILLIGRAM(S): at 23:21

## 2017-01-01 RX ADMIN — Medication 21.89 MICROGRAM(S)/KG/MIN: at 17:11

## 2017-01-01 RX ADMIN — ENOXAPARIN SODIUM 40 MILLIGRAM(S): 100 INJECTION SUBCUTANEOUS at 11:58

## 2017-01-01 RX ADMIN — Medication 50 MILLIGRAM(S): at 12:29

## 2017-01-01 RX ADMIN — Medication 100 MILLIGRAM(S): at 21:05

## 2017-01-01 RX ADMIN — ATORVASTATIN CALCIUM 40 MILLIGRAM(S): 80 TABLET, FILM COATED ORAL at 22:47

## 2017-01-01 RX ADMIN — Medication 500 MILLIGRAM(S): at 15:02

## 2017-01-01 RX ADMIN — HEPARIN SODIUM 5000 UNIT(S): 5000 INJECTION INTRAVENOUS; SUBCUTANEOUS at 05:49

## 2017-01-01 RX ADMIN — MEROPENEM 100 MILLIGRAM(S): 1 INJECTION INTRAVENOUS at 05:15

## 2017-01-01 RX ADMIN — HEPARIN SODIUM 5000 UNIT(S): 5000 INJECTION INTRAVENOUS; SUBCUTANEOUS at 21:54

## 2017-01-01 RX ADMIN — Medication 300 MILLIGRAM(S): at 14:59

## 2017-01-01 RX ADMIN — POTASSIUM PHOSPHATE, MONOBASIC POTASSIUM PHOSPHATE, DIBASIC 62.5 MILLIMOLE(S): 236; 224 INJECTION, SOLUTION INTRAVENOUS at 18:17

## 2017-01-01 RX ADMIN — DEXMEDETOMIDINE HYDROCHLORIDE IN 0.9% SODIUM CHLORIDE 3 MICROGRAM(S)/KG/HR: 4 INJECTION INTRAVENOUS at 22:07

## 2017-01-01 RX ADMIN — PIPERACILLIN AND TAZOBACTAM 12.5 GRAM(S): 4; .5 INJECTION, POWDER, LYOPHILIZED, FOR SOLUTION INTRAVENOUS at 23:39

## 2017-01-01 RX ADMIN — Medication 650 MILLIGRAM(S): at 08:37

## 2017-01-01 RX ADMIN — Medication 81 MILLIGRAM(S): at 11:05

## 2017-01-01 RX ADMIN — FENTANYL CITRATE 100 MICROGRAM(S): 50 INJECTION INTRAVENOUS at 21:31

## 2017-01-01 RX ADMIN — Medication 10 MILLIGRAM(S): at 02:59

## 2017-01-01 RX ADMIN — DEXMEDETOMIDINE HYDROCHLORIDE IN 0.9% SODIUM CHLORIDE 3 MICROGRAM(S)/KG/HR: 4 INJECTION INTRAVENOUS at 05:55

## 2017-01-01 RX ADMIN — Medication 400 MILLIGRAM(S): at 05:29

## 2017-01-01 RX ADMIN — HEPARIN SODIUM 5000 UNIT(S): 5000 INJECTION INTRAVENOUS; SUBCUTANEOUS at 21:02

## 2017-01-01 RX ADMIN — POTASSIUM PHOSPHATE, MONOBASIC POTASSIUM PHOSPHATE, DIBASIC 62.5 MILLIMOLE(S): 236; 224 INJECTION, SOLUTION INTRAVENOUS at 14:58

## 2017-01-01 RX ADMIN — SODIUM CHLORIDE 100 MILLILITER(S): 9 INJECTION, SOLUTION INTRAVENOUS at 06:32

## 2017-01-01 RX ADMIN — HYDROMORPHONE HYDROCHLORIDE 1 MILLIGRAM(S): 2 INJECTION INTRAMUSCULAR; INTRAVENOUS; SUBCUTANEOUS at 11:42

## 2017-01-01 RX ADMIN — PIPERACILLIN AND TAZOBACTAM 12.5 GRAM(S): 4; .5 INJECTION, POWDER, LYOPHILIZED, FOR SOLUTION INTRAVENOUS at 05:52

## 2017-01-01 RX ADMIN — Medication 50 MILLIGRAM(S): at 12:39

## 2017-01-01 RX ADMIN — FENTANYL CITRATE 14.01 MICROGRAM(S)/KG/HR: 50 INJECTION INTRAVENOUS at 11:36

## 2017-01-01 RX ADMIN — HYDROMORPHONE HYDROCHLORIDE 0.5 MILLIGRAM(S): 2 INJECTION INTRAMUSCULAR; INTRAVENOUS; SUBCUTANEOUS at 15:14

## 2017-01-01 RX ADMIN — HEPARIN SODIUM 5000 UNIT(S): 5000 INJECTION INTRAVENOUS; SUBCUTANEOUS at 05:03

## 2017-01-01 RX ADMIN — FENTANYL CITRATE 50 MICROGRAM(S): 50 INJECTION INTRAVENOUS at 09:20

## 2017-01-01 RX ADMIN — Medication 1 TABLET(S): at 12:29

## 2017-01-01 RX ADMIN — Medication 10 MILLIGRAM(S): at 10:03

## 2017-01-01 RX ADMIN — DEXMEDETOMIDINE HYDROCHLORIDE IN 0.9% SODIUM CHLORIDE 3 MICROGRAM(S)/KG/HR: 4 INJECTION INTRAVENOUS at 23:18

## 2017-01-01 RX ADMIN — Medication 325 MILLIGRAM(S): at 13:04

## 2017-01-01 RX ADMIN — Medication 400 MILLIGRAM(S): at 14:07

## 2017-01-01 RX ADMIN — Medication 50 MILLILITER(S): at 00:03

## 2017-01-01 RX ADMIN — PIPERACILLIN AND TAZOBACTAM 12.5 GRAM(S): 4; .5 INJECTION, POWDER, LYOPHILIZED, FOR SOLUTION INTRAVENOUS at 05:56

## 2017-01-01 RX ADMIN — Medication 20 MILLIGRAM(S): at 11:58

## 2017-01-01 RX ADMIN — Medication 100 MILLIEQUIVALENT(S): at 10:58

## 2017-01-01 RX ADMIN — DEXMEDETOMIDINE HYDROCHLORIDE IN 0.9% SODIUM CHLORIDE 3 MICROGRAM(S)/KG/HR: 4 INJECTION INTRAVENOUS at 18:52

## 2017-01-01 RX ADMIN — ENOXAPARIN SODIUM 40 MILLIGRAM(S): 100 INJECTION SUBCUTANEOUS at 11:08

## 2017-01-01 RX ADMIN — Medication 50 MILLIGRAM(S): at 11:04

## 2017-01-01 RX ADMIN — Medication 1 MILLIGRAM(S): at 12:29

## 2017-01-01 RX ADMIN — Medication 15 MILLIGRAM(S): at 23:17

## 2017-01-01 RX ADMIN — Medication 40 MILLIGRAM(S): at 05:49

## 2017-01-01 RX ADMIN — Medication 100 MILLIEQUIVALENT(S): at 08:50

## 2017-01-01 RX ADMIN — MORPHINE SULFATE 2 MILLIGRAM(S): 50 CAPSULE, EXTENDED RELEASE ORAL at 09:02

## 2017-01-01 RX ADMIN — ATORVASTATIN CALCIUM 40 MILLIGRAM(S): 80 TABLET, FILM COATED ORAL at 22:30

## 2017-01-01 RX ADMIN — Medication 1 MILLIGRAM(S): at 17:33

## 2017-01-01 RX ADMIN — POTASSIUM PHOSPHATE, MONOBASIC POTASSIUM PHOSPHATE, DIBASIC 62.5 MILLIMOLE(S): 236; 224 INJECTION, SOLUTION INTRAVENOUS at 11:03

## 2017-01-01 RX ADMIN — Medication 81 MILLIGRAM(S): at 11:34

## 2017-01-01 RX ADMIN — DEXMEDETOMIDINE HYDROCHLORIDE IN 0.9% SODIUM CHLORIDE 3 MICROGRAM(S)/KG/HR: 4 INJECTION INTRAVENOUS at 17:00

## 2017-01-01 RX ADMIN — ATORVASTATIN CALCIUM 40 MILLIGRAM(S): 80 TABLET, FILM COATED ORAL at 23:40

## 2017-01-01 RX ADMIN — Medication 50 MILLIGRAM(S): at 14:34

## 2017-01-01 RX ADMIN — DEXMEDETOMIDINE HYDROCHLORIDE IN 0.9% SODIUM CHLORIDE 3 MICROGRAM(S)/KG/HR: 4 INJECTION INTRAVENOUS at 04:26

## 2017-01-01 RX ADMIN — Medication 300 MILLIGRAM(S): at 11:34

## 2017-01-01 RX ADMIN — MEROPENEM 100 MILLIGRAM(S): 1 INJECTION INTRAVENOUS at 21:49

## 2017-01-01 RX ADMIN — Medication 50 MILLILITER(S): at 05:37

## 2017-01-01 RX ADMIN — Medication 1 TABLET(S): at 11:59

## 2017-01-01 RX ADMIN — FENTANYL CITRATE 14.01 MICROGRAM(S)/KG/HR: 50 INJECTION INTRAVENOUS at 18:06

## 2017-01-01 RX ADMIN — SODIUM CHLORIDE 100 MILLILITER(S): 9 INJECTION, SOLUTION INTRAVENOUS at 20:22

## 2017-01-01 RX ADMIN — Medication 50 MILLIGRAM(S): at 17:22

## 2017-01-01 RX ADMIN — HEPARIN SODIUM 5000 UNIT(S): 5000 INJECTION INTRAVENOUS; SUBCUTANEOUS at 14:55

## 2017-01-01 RX ADMIN — Medication 400 MILLIGRAM(S): at 23:01

## 2017-01-01 RX ADMIN — Medication 81 MILLIGRAM(S): at 12:37

## 2017-01-01 RX ADMIN — ENOXAPARIN SODIUM 40 MILLIGRAM(S): 100 INJECTION SUBCUTANEOUS at 12:57

## 2017-01-01 RX ADMIN — Medication 500 MILLIGRAM(S): at 12:34

## 2017-01-01 RX ADMIN — FENTANYL CITRATE 25 MICROGRAM(S): 50 INJECTION INTRAVENOUS at 14:09

## 2017-01-01 RX ADMIN — Medication 15 MILLIGRAM(S): at 08:40

## 2017-01-01 RX ADMIN — DEXMEDETOMIDINE HYDROCHLORIDE IN 0.9% SODIUM CHLORIDE 3 MICROGRAM(S)/KG/HR: 4 INJECTION INTRAVENOUS at 22:44

## 2017-01-01 RX ADMIN — PANTOPRAZOLE SODIUM 40 MILLIGRAM(S): 20 TABLET, DELAYED RELEASE ORAL at 12:04

## 2017-01-01 RX ADMIN — FENTANYL CITRATE 18.68 MICROGRAM(S)/KG/HR: 50 INJECTION INTRAVENOUS at 18:46

## 2017-01-01 RX ADMIN — Medication 250 MILLIGRAM(S): at 16:56

## 2017-01-01 RX ADMIN — SODIUM CHLORIDE 2000 MILLILITER(S): 9 INJECTION INTRAMUSCULAR; INTRAVENOUS; SUBCUTANEOUS at 07:28

## 2017-01-01 RX ADMIN — HEPARIN SODIUM 5000 UNIT(S): 5000 INJECTION INTRAVENOUS; SUBCUTANEOUS at 13:03

## 2017-01-01 RX ADMIN — Medication 15 MILLIGRAM(S): at 14:34

## 2017-01-01 RX ADMIN — FENTANYL CITRATE 100 MICROGRAM(S): 50 INJECTION INTRAVENOUS at 17:39

## 2017-01-01 RX ADMIN — POTASSIUM PHOSPHATE, MONOBASIC POTASSIUM PHOSPHATE, DIBASIC 62.5 MILLIMOLE(S): 236; 224 INJECTION, SOLUTION INTRAVENOUS at 18:02

## 2017-01-01 RX ADMIN — Medication 650 MILLIGRAM(S): at 16:29

## 2017-01-01 RX ADMIN — DEXMEDETOMIDINE HYDROCHLORIDE IN 0.9% SODIUM CHLORIDE 3 MICROGRAM(S)/KG/HR: 4 INJECTION INTRAVENOUS at 14:54

## 2017-01-01 RX ADMIN — Medication 40 MILLIEQUIVALENT(S): at 12:39

## 2017-01-01 RX ADMIN — DEXMEDETOMIDINE HYDROCHLORIDE IN 0.9% SODIUM CHLORIDE 3 MICROGRAM(S)/KG/HR: 4 INJECTION INTRAVENOUS at 15:35

## 2017-01-01 RX ADMIN — Medication 50 MILLIGRAM(S): at 12:33

## 2017-01-01 RX ADMIN — POTASSIUM PHOSPHATE, MONOBASIC POTASSIUM PHOSPHATE, DIBASIC 62.5 MILLIMOLE(S): 236; 224 INJECTION, SOLUTION INTRAVENOUS at 12:31

## 2017-01-01 RX ADMIN — VASOPRESSIN 2.4 UNIT(S)/MIN: 20 INJECTION INTRAVENOUS at 17:52

## 2017-01-01 RX ADMIN — DEXMEDETOMIDINE HYDROCHLORIDE IN 0.9% SODIUM CHLORIDE 3 MICROGRAM(S)/KG/HR: 4 INJECTION INTRAVENOUS at 15:02

## 2017-01-01 RX ADMIN — FENTANYL CITRATE 25 MICROGRAM(S): 50 INJECTION INTRAVENOUS at 18:15

## 2017-01-01 RX ADMIN — HYDROMORPHONE HYDROCHLORIDE 1 MILLIGRAM(S): 2 INJECTION INTRAMUSCULAR; INTRAVENOUS; SUBCUTANEOUS at 11:58

## 2017-01-01 RX ADMIN — FENTANYL CITRATE 2.33 MICROGRAM(S)/KG/HR: 50 INJECTION INTRAVENOUS at 11:50

## 2017-01-01 RX ADMIN — Medication 1 MILLIGRAM(S): at 12:34

## 2017-01-01 RX ADMIN — DEXMEDETOMIDINE HYDROCHLORIDE IN 0.9% SODIUM CHLORIDE 3 MICROGRAM(S)/KG/HR: 4 INJECTION INTRAVENOUS at 18:29

## 2017-01-01 RX ADMIN — FENTANYL CITRATE 50 MICROGRAM(S): 50 INJECTION INTRAVENOUS at 14:11

## 2017-01-01 RX ADMIN — PIPERACILLIN AND TAZOBACTAM 12.5 GRAM(S): 4; .5 INJECTION, POWDER, LYOPHILIZED, FOR SOLUTION INTRAVENOUS at 14:58

## 2017-01-01 RX ADMIN — Medication 300 MILLIGRAM(S): at 12:37

## 2017-01-01 RX ADMIN — Medication 100 MILLIEQUIVALENT(S): at 09:56

## 2017-01-01 RX ADMIN — HYDROMORPHONE HYDROCHLORIDE 1 MILLIGRAM(S): 2 INJECTION INTRAMUSCULAR; INTRAVENOUS; SUBCUTANEOUS at 16:15

## 2017-01-01 RX ADMIN — Medication 1 TABLET(S): at 11:04

## 2017-01-01 RX ADMIN — Medication 1 TABLET(S): at 12:30

## 2017-01-01 RX ADMIN — HEPARIN SODIUM 5000 UNIT(S): 5000 INJECTION INTRAVENOUS; SUBCUTANEOUS at 14:15

## 2017-01-01 RX ADMIN — FENTANYL CITRATE 100 MICROGRAM(S): 50 INJECTION INTRAVENOUS at 02:17

## 2017-01-01 RX ADMIN — Medication 40 MILLIGRAM(S): at 05:21

## 2017-01-01 RX ADMIN — Medication 500 MILLIGRAM(S): at 05:32

## 2017-01-01 RX ADMIN — Medication 650 MILLIGRAM(S): at 00:20

## 2017-01-01 RX ADMIN — MORPHINE SULFATE 2 MILLIGRAM(S): 50 CAPSULE, EXTENDED RELEASE ORAL at 13:29

## 2017-01-01 RX ADMIN — FENTANYL CITRATE 100 MICROGRAM(S): 50 INJECTION INTRAVENOUS at 06:34

## 2017-01-01 RX ADMIN — Medication 1 MILLIGRAM(S): at 11:08

## 2017-01-01 RX ADMIN — MIDAZOLAM HYDROCHLORIDE 1 MILLIGRAM(S): 1 INJECTION, SOLUTION INTRAMUSCULAR; INTRAVENOUS at 20:41

## 2017-01-01 RX ADMIN — DEXMEDETOMIDINE HYDROCHLORIDE IN 0.9% SODIUM CHLORIDE 3 MICROGRAM(S)/KG/HR: 4 INJECTION INTRAVENOUS at 00:44

## 2017-01-01 RX ADMIN — Medication 650 MILLIGRAM(S): at 20:59

## 2017-01-01 RX ADMIN — Medication 300 MILLIGRAM(S): at 12:30

## 2017-01-01 RX ADMIN — DEXMEDETOMIDINE HYDROCHLORIDE IN 0.9% SODIUM CHLORIDE 3 MICROGRAM(S)/KG/HR: 4 INJECTION INTRAVENOUS at 11:58

## 2017-01-01 RX ADMIN — POTASSIUM PHOSPHATE, MONOBASIC POTASSIUM PHOSPHATE, DIBASIC 62.5 MILLIMOLE(S): 236; 224 INJECTION, SOLUTION INTRAVENOUS at 10:25

## 2017-01-01 RX ADMIN — Medication 325 MILLIGRAM(S): at 08:03

## 2017-01-01 RX ADMIN — HYDROMORPHONE HYDROCHLORIDE 2 MILLIGRAM(S): 2 INJECTION INTRAMUSCULAR; INTRAVENOUS; SUBCUTANEOUS at 19:55

## 2017-01-01 RX ADMIN — ATORVASTATIN CALCIUM 40 MILLIGRAM(S): 80 TABLET, FILM COATED ORAL at 21:02

## 2017-01-01 RX ADMIN — FENTANYL CITRATE 100 MICROGRAM(S): 50 INJECTION INTRAVENOUS at 21:07

## 2017-01-01 RX ADMIN — HYDROMORPHONE HYDROCHLORIDE 2 MILLIGRAM(S): 2 INJECTION INTRAMUSCULAR; INTRAVENOUS; SUBCUTANEOUS at 14:23

## 2017-01-01 RX ADMIN — DEXMEDETOMIDINE HYDROCHLORIDE IN 0.9% SODIUM CHLORIDE 3 MICROGRAM(S)/KG/HR: 4 INJECTION INTRAVENOUS at 21:07

## 2017-01-01 RX ADMIN — FENTANYL CITRATE 50 MICROGRAM(S): 50 INJECTION INTRAVENOUS at 02:34

## 2017-01-01 RX ADMIN — HEPARIN SODIUM 5000 UNIT(S): 5000 INJECTION INTRAVENOUS; SUBCUTANEOUS at 21:11

## 2017-01-01 RX ADMIN — DEXMEDETOMIDINE HYDROCHLORIDE IN 0.9% SODIUM CHLORIDE 3 MICROGRAM(S)/KG/HR: 4 INJECTION INTRAVENOUS at 10:16

## 2017-01-01 RX ADMIN — Medication 50 MILLIGRAM(S): at 11:43

## 2017-01-01 RX ADMIN — HEPARIN SODIUM 5000 UNIT(S): 5000 INJECTION INTRAVENOUS; SUBCUTANEOUS at 22:02

## 2017-01-01 RX ADMIN — Medication 325 MILLIGRAM(S): at 18:27

## 2017-01-01 RX ADMIN — ONDANSETRON 4 MILLIGRAM(S): 8 TABLET, FILM COATED ORAL at 00:27

## 2017-01-01 RX ADMIN — FENTANYL CITRATE 50 MICROGRAM(S): 50 INJECTION INTRAVENOUS at 09:57

## 2017-01-01 RX ADMIN — PIPERACILLIN AND TAZOBACTAM 12.5 GRAM(S): 4; .5 INJECTION, POWDER, LYOPHILIZED, FOR SOLUTION INTRAVENOUS at 22:47

## 2017-01-01 RX ADMIN — PIPERACILLIN AND TAZOBACTAM 12.5 GRAM(S): 4; .5 INJECTION, POWDER, LYOPHILIZED, FOR SOLUTION INTRAVENOUS at 06:31

## 2017-01-01 RX ADMIN — DEXMEDETOMIDINE HYDROCHLORIDE IN 0.9% SODIUM CHLORIDE 3 MICROGRAM(S)/KG/HR: 4 INJECTION INTRAVENOUS at 01:35

## 2017-01-01 RX ADMIN — FENTANYL CITRATE 50 MICROGRAM(S): 50 INJECTION INTRAVENOUS at 19:57

## 2017-01-01 RX ADMIN — PANTOPRAZOLE SODIUM 40 MILLIGRAM(S): 20 TABLET, DELAYED RELEASE ORAL at 15:00

## 2017-01-01 RX ADMIN — HEPARIN SODIUM 5000 UNIT(S): 5000 INJECTION INTRAVENOUS; SUBCUTANEOUS at 22:47

## 2017-01-01 RX ADMIN — Medication 40 MILLIGRAM(S): at 08:54

## 2017-01-01 RX ADMIN — HYDROMORPHONE HYDROCHLORIDE 1 MILLIGRAM(S): 2 INJECTION INTRAMUSCULAR; INTRAVENOUS; SUBCUTANEOUS at 13:08

## 2017-01-01 RX ADMIN — POTASSIUM PHOSPHATE, MONOBASIC POTASSIUM PHOSPHATE, DIBASIC 62.5 MILLIMOLE(S): 236; 224 INJECTION, SOLUTION INTRAVENOUS at 08:20

## 2017-01-01 RX ADMIN — Medication 40 MILLIGRAM(S): at 06:12

## 2017-01-01 RX ADMIN — HEPARIN SODIUM 5000 UNIT(S): 5000 INJECTION INTRAVENOUS; SUBCUTANEOUS at 13:11

## 2017-01-01 RX ADMIN — FENTANYL CITRATE 50 MICROGRAM(S): 50 INJECTION INTRAVENOUS at 20:03

## 2017-01-01 RX ADMIN — HYDROMORPHONE HYDROCHLORIDE 1 MILLIGRAM(S): 2 INJECTION INTRAMUSCULAR; INTRAVENOUS; SUBCUTANEOUS at 10:13

## 2017-01-01 RX ADMIN — Medication 325 MILLIGRAM(S): at 17:32

## 2017-01-01 RX ADMIN — Medication 500 MILLIGRAM(S): at 11:05

## 2017-01-01 RX ADMIN — MEROPENEM 100 MILLIGRAM(S): 1 INJECTION INTRAVENOUS at 13:12

## 2017-01-01 RX ADMIN — Medication 400 MILLIGRAM(S): at 17:40

## 2017-01-01 RX ADMIN — DEXMEDETOMIDINE HYDROCHLORIDE IN 0.9% SODIUM CHLORIDE 3 MICROGRAM(S)/KG/HR: 4 INJECTION INTRAVENOUS at 07:18

## 2017-01-01 RX ADMIN — MORPHINE SULFATE 4 MILLIGRAM(S): 50 CAPSULE, EXTENDED RELEASE ORAL at 00:27

## 2017-01-01 RX ADMIN — Medication 40 MILLIGRAM(S): at 17:39

## 2017-01-01 RX ADMIN — HEPARIN SODIUM 5000 UNIT(S): 5000 INJECTION INTRAVENOUS; SUBCUTANEOUS at 17:01

## 2017-01-01 RX ADMIN — Medication 15 MILLIGRAM(S): at 16:23

## 2017-01-01 RX ADMIN — MORPHINE SULFATE 4 MILLIGRAM(S): 50 CAPSULE, EXTENDED RELEASE ORAL at 01:08

## 2017-01-01 RX ADMIN — PIPERACILLIN AND TAZOBACTAM 12.5 GRAM(S): 4; .5 INJECTION, POWDER, LYOPHILIZED, FOR SOLUTION INTRAVENOUS at 22:07

## 2017-01-01 RX ADMIN — SODIUM CHLORIDE 125 MILLILITER(S): 9 INJECTION INTRAMUSCULAR; INTRAVENOUS; SUBCUTANEOUS at 03:51

## 2017-01-01 RX ADMIN — Medication 325 MILLIGRAM(S): at 17:55

## 2017-01-01 RX ADMIN — CEFTRIAXONE 100 GRAM(S): 500 INJECTION, POWDER, FOR SOLUTION INTRAMUSCULAR; INTRAVENOUS at 19:03

## 2017-01-01 RX ADMIN — Medication 1 TABLET(S): at 12:33

## 2017-01-01 RX ADMIN — Medication 1 MILLIGRAM(S): at 11:27

## 2017-01-01 RX ADMIN — MORPHINE SULFATE 4 MILLIGRAM(S): 50 CAPSULE, EXTENDED RELEASE ORAL at 01:28

## 2017-01-01 RX ADMIN — Medication 1 TABLET(S): at 11:27

## 2017-01-01 RX ADMIN — HEPARIN SODIUM 5000 UNIT(S): 5000 INJECTION INTRAVENOUS; SUBCUTANEOUS at 06:11

## 2017-01-01 RX ADMIN — ENOXAPARIN SODIUM 40 MILLIGRAM(S): 100 INJECTION SUBCUTANEOUS at 12:26

## 2017-01-01 RX ADMIN — Medication 100 MILLIGRAM(S): at 02:36

## 2017-01-01 RX ADMIN — HYDROMORPHONE HYDROCHLORIDE 1 MILLIGRAM(S): 2 INJECTION INTRAMUSCULAR; INTRAVENOUS; SUBCUTANEOUS at 11:46

## 2017-01-01 RX ADMIN — HYDROMORPHONE HYDROCHLORIDE 1 MILLIGRAM(S): 2 INJECTION INTRAMUSCULAR; INTRAVENOUS; SUBCUTANEOUS at 06:46

## 2017-01-01 RX ADMIN — Medication 81 MILLIGRAM(S): at 17:02

## 2017-01-01 RX ADMIN — DEXMEDETOMIDINE HYDROCHLORIDE IN 0.9% SODIUM CHLORIDE 3 MICROGRAM(S)/KG/HR: 4 INJECTION INTRAVENOUS at 00:47

## 2017-01-01 RX ADMIN — PANTOPRAZOLE SODIUM 40 MILLIGRAM(S): 20 TABLET, DELAYED RELEASE ORAL at 12:30

## 2017-01-01 RX ADMIN — Medication 1 MILLIGRAM(S): at 11:04

## 2017-01-01 RX ADMIN — POTASSIUM PHOSPHATE, MONOBASIC POTASSIUM PHOSPHATE, DIBASIC 62.5 MILLIMOLE(S): 236; 224 INJECTION, SOLUTION INTRAVENOUS at 11:43

## 2017-01-01 RX ADMIN — CEFTRIAXONE 100 GRAM(S): 500 INJECTION, POWDER, FOR SOLUTION INTRAMUSCULAR; INTRAVENOUS at 21:11

## 2017-01-01 RX ADMIN — SODIUM CHLORIDE 62.5 MILLILITER(S): 9 INJECTION, SOLUTION INTRAVENOUS at 22:38

## 2017-01-01 RX ADMIN — MEROPENEM 100 MILLIGRAM(S): 1 INJECTION INTRAVENOUS at 13:10

## 2017-01-01 RX ADMIN — HEPARIN SODIUM 5000 UNIT(S): 5000 INJECTION INTRAVENOUS; SUBCUTANEOUS at 22:07

## 2017-01-01 RX ADMIN — HYDROMORPHONE HYDROCHLORIDE 0.5 MILLIGRAM(S): 2 INJECTION INTRAMUSCULAR; INTRAVENOUS; SUBCUTANEOUS at 13:16

## 2017-01-01 RX ADMIN — MEROPENEM 100 MILLIGRAM(S): 1 INJECTION INTRAVENOUS at 22:43

## 2017-01-01 RX ADMIN — Medication 500 MILLIGRAM(S): at 12:37

## 2017-01-01 RX ADMIN — MEROPENEM 100 MILLIGRAM(S): 1 INJECTION INTRAVENOUS at 06:12

## 2017-01-01 RX ADMIN — Medication 100 MILLIGRAM(S): at 13:42

## 2017-01-01 RX ADMIN — Medication 100 MILLIGRAM(S): at 06:41

## 2017-01-01 RX ADMIN — DEXMEDETOMIDINE HYDROCHLORIDE IN 0.9% SODIUM CHLORIDE 3 MICROGRAM(S)/KG/HR: 4 INJECTION INTRAVENOUS at 06:13

## 2017-01-01 RX ADMIN — CEFTRIAXONE 100 GRAM(S): 500 INJECTION, POWDER, FOR SOLUTION INTRAMUSCULAR; INTRAVENOUS at 18:28

## 2017-01-01 RX ADMIN — FENTANYL CITRATE 100 MICROGRAM(S): 50 INJECTION INTRAVENOUS at 14:29

## 2017-01-01 RX ADMIN — DEXMEDETOMIDINE HYDROCHLORIDE IN 0.9% SODIUM CHLORIDE 3 MICROGRAM(S)/KG/HR: 4 INJECTION INTRAVENOUS at 04:27

## 2017-01-01 RX ADMIN — SODIUM CHLORIDE 1000 MILLILITER(S): 9 INJECTION INTRAMUSCULAR; INTRAVENOUS; SUBCUTANEOUS at 14:55

## 2017-01-01 RX ADMIN — FENTANYL CITRATE 14.01 MICROGRAM(S)/KG/HR: 50 INJECTION INTRAVENOUS at 04:56

## 2017-01-01 RX ADMIN — Medication 100 MILLIGRAM(S): at 15:05

## 2017-01-01 RX ADMIN — Medication 40 MILLIGRAM(S): at 05:15

## 2017-01-01 RX ADMIN — QUETIAPINE FUMARATE 25 MILLIGRAM(S): 200 TABLET, FILM COATED ORAL at 21:54

## 2017-01-01 RX ADMIN — SODIUM CHLORIDE 1000 MILLILITER(S): 9 INJECTION, SOLUTION INTRAVENOUS at 17:04

## 2017-01-01 RX ADMIN — Medication 1 TABLET(S): at 11:58

## 2017-11-06 NOTE — ED ADULT NURSE NOTE - OBJECTIVE STATEMENT
pt states she fell at her house while walking w/o her walker, c/o right hip pain pt states she fell at her house while walking w/o her walker, c/o right hip pain, pt refused morales catheter

## 2017-11-07 NOTE — ADVANCED PRACTICE NURSE CONSULT - ASSESSMENT
This is a 88yr old female patient admitted for R. Femur Fracture, presenting with evidence of Psoriasis and hypopigmentation areas to the Bilateral Lower Legs

## 2017-11-07 NOTE — ADVANCED PRACTICE NURSE CONSULT - RECOMMEDATIONS
-Please consult Dermatology for further evaluation  -Elevate/float the patients heels using heel protectors and reposition the patient Q 2hrs using wedges or pillows

## 2017-11-07 NOTE — H&P ADULT - PROBLEM SELECTOR PLAN 1
Closed right hip fracture  Coags, Type/cross obtained. EKG: TWI in inferior leads, however no CP, Negative T1  Will get TTE  Ortho consult requested, awaiting official note  NPO, IVF Closed right hip fracture  Coags, Type/cross obtained. EKG: TWI in inferior leads, however no CP, Negative T1, will get 1 more set of CE  Will get TTE  Ortho consult requested, awaiting official note  NPO, IVF

## 2017-11-07 NOTE — ED PROVIDER NOTE - CONSTITUTIONAL, MLM
normal... Well appearing, well nourished, awake, alert, oriented to person, place, time/situation and in no apparent distress. Well appearing, well nourished, awake, alert, oriented to person, place, time/situation and in mild distress from pain

## 2017-11-07 NOTE — ED PROVIDER NOTE - MUSCULOSKELETAL MINIMAL EXAM
pelvis stable, moderate R lateral hip tenderness. Unable to range hip 2/2 pain. R knee & lower leg - non tender, no swelling. 2+ DP pulses. RLE w normal distal strength and sensation.

## 2017-11-07 NOTE — H&P ADULT - PROBLEM SELECTOR PLAN 2
Patient takes PTU at home, However herself id not sure about h/O Hyperthyroidism  Will Check TSH  and restart home dose

## 2017-11-07 NOTE — CHART NOTE - NSCHARTNOTEFT_GEN_A_CORE
Pt complained of  Rt LE pain and Rt calf pain. Pt denied chest pain, SOB. recheck vitals normal, afebrile. Physical exam: Rt LE darker and warmer than left LE, Johnson sign positive.   -Will f/u US doppler to r/o DVT.  -Will repeat cbc , bmp, lactate, esr, BCx to r/o cellulitis.  -start zosyn now. Pt complained of  Rt LE pain and Rt calf pain. Pt denied chest pain, SOB. recheck vitals normal, afebrile. Physical exam: Rt LE darker and warmer than left LE, Johnson sign positive.   -Will f/u US doppler to r/o DVT.  -Will repeat cbc , bmp, esr, crp in AM.

## 2017-11-07 NOTE — ED PROVIDER NOTE - OBJECTIVE STATEMENT
89 y/o F w/ no significant history presents to ED c/o R hip pain, sharp, severe, since fall at 2100 tonight. Pt states she was walking in living room when she slipped & fell. Pt normally walks w/ walker and did not have it tonight when she fell. Denies any head trauma, headache, neck pain, chest pain, back pain, numbness, weakness, or any other complaints. NKDA. Denies smoking.

## 2017-11-07 NOTE — CONSULT NOTE ADULT - SUBJECTIVE AND OBJECTIVE BOX
JOSE MOORE  MRN-[id number]     ORTHOPEDIC CONSULT:    Diagnosis:  R Hip Basicervical Fracture     88yFemale    Patient was seen and evaluated at bedside. Pt has a hx of dementia although states she fell last night, otherwise states she does not remember. As per H&P- pt ambulates w/walker at baseline and fell at home while walking in the living room.  Denies CP/SOB, dyspnea, paresthesias, N/V/D, palpitations.     Vital Signs Last 24 Hrs  T(C): 36.7 (07 Nov 2017 14:26), Max: 37.2 (07 Nov 2017 03:22)  T(F): 98.1 (07 Nov 2017 14:26), Max: 98.9 (07 Nov 2017 03:22)  HR: 96 (07 Nov 2017 14:26) (66 - 97)  BP: 156/81 (07 Nov 2017 14:26) (150/84 - 182/73)  BP(mean): --  RR: 17 (07 Nov 2017 14:26) (17 - 20)  SpO2: 98% (07 Nov 2017 14:26) (92% - 99%)  I&O's Detail      Physical Exam:    General: AAOx1-place, NAD, resting comfortably in bed.    R Hip:   Skin is pink and warm. Tender at the fracture site. Decreased ROM of the affected hip due to pain. SILT.  Positive logroll test. Unable to Straight leg raise due to hip pain.   Lower extremity:  No calf tenderness, calves are soft. 1+pulses. NVI. (+)EHL/FHl/ADF/APF.  Good capillary refill. Warm, well-perfused. Hypopigmentation areas noted to distal legs B/L- as per wound nurse- signs of psoriasis.                           13.5   9.2   )-----------( 220      ( 07 Nov 2017 00:27 )             43.1     11-07    141  |  108  |  29<H>  ----------------------------<  123<H>  4.4   |  28  |  0.44<L>    Ca    8.7      07 Nov 2017 00:27    TPro  6.3  /  Alb  3.0<L>  /  TBili  0.3  /  DBili  x   /  AST  13  /  ALT  20  /  AlkPhos  75  11-07    PT/INR - ( 07 Nov 2017 00:27 )   PT: 11.4 sec;   INR: 1.04 ratio         PTT - ( 07 Nov 2017 00:27 )  PTT:30.9 sec      Impression:  88yFemale with R Hip Basicervical Fracture    Plan:  -  D/w - Recommends fixation in the OR when medically optimized for surgery- likely thursday AM.   -  Pain management  -  Dvt prophylaxis   -  Medical Optimization  -  Consent: Pending- unable to speak to daughter no telephone on record- notified nurse to obtain number of any visitors   -  NWB to RLE

## 2017-11-07 NOTE — PROGRESS NOTE ADULT - ASSESSMENT
Patient is a 89 y/o F, from home, walks with a walker with PMHx of ? Hyperthyroidism ( pt on PTU at home) presents to ED c/o R hip pain, sharp, severe, since fall at 9 PM yesterday night. Patient has stable vitals in the ED, initially saturating well, however later saturating in low 90's on the floor without any resp distress, CXR: No focal lung consolidation.    No pneumothorax or pleural effusion. Placed on 2L NC with improvement in saturation, Labs WNL, except elevated BUN (? Dehydration), Coags WNL, type cross obtained. s/p 1 L NS and pain management in the ED. X ray Right Hip: Femoral Neck Fracture, admitted for management of femoral neck Fracture.

## 2017-11-07 NOTE — CONSULT NOTE ADULT - PROBLEM SELECTOR RECOMMENDATION 9
88yFemale with R Hip Basicervical Fracture  NWB to RLE  Medical optimization for OR on Thursday possibly  Will obtain consent from daughter

## 2017-11-07 NOTE — ED PROVIDER NOTE - MEDICAL DECISION MAKING DETAILS
87 yo F presenting R hip pain s/p mechanical fall. Will check x-rays, r/o hip fx, give IV analgesics and re-assess.

## 2017-11-07 NOTE — ED PROVIDER NOTE - ENMT, MLM
HEAD: no swelling, tenderness or discoloration. Airway patent, Nasal mucosa clear. Mouth with normal mucosa. Throat has no vesicles, no oropharyngeal exudates and uvula is midline.

## 2017-11-07 NOTE — H&P ADULT - NSHPPHYSICALEXAM_GEN_ALL_CORE
GENERAL: elderly female, moaning in pain and not giving appropriate answers at this time  HEAD: atraumatic, normocephalic   EYES: PERRLA, white sclera.   ENMT: nasal mucosa- Oral cavity- moist  NECK: supple, no JVD, thyroid- not palpable  LYMPH: no palpable lymph nodes     SKIN:  warm and dry  CHEST/LUNG: No Chest deformity , no chest tenderness. bilateral breath sounds,no  adventitious sounds  HEART: RRR, no m/r/g   ABDOMEN: soft, nontender, nondistended; bowel sounds+  EXTREMITIES: No pedal edema, no cyanosis, no clubbing. right lower extremity, very restricted ROM 2/2 to pain  NEURO: AA0X2 GENERAL: elderly female, moaning in pain and not giving appropriate answers at this time  HEAD: atraumatic, normocephalic   EYES: PERRLA, white sclera.   ENMT: nasal mucosa- Oral cavity- moist  NECK: supple, no JVD, thyroid- not palpable  LYMPH: no palpable lymph nodes     SKIN:  warm and dry  CHEST/LUNG: No Chest deformity , no chest tenderness. bilateral breath sounds,no  adventitious sounds  HEART: RRR, no m/r/g   ABDOMEN: soft, nontender, nondistended; bowel sounds+  EXTREMITIES: No pedal edema, no cyanosis, no clubbing. right lower extremity, very restricted ROM 2/2 to pain. Chronic scaling and skin changes on both legs  NEURO: AA0X2

## 2017-11-07 NOTE — PROGRESS NOTE ADULT - PROBLEM SELECTOR PLAN 2
Patient takes PTU at home, However herself id not sure about h/O Hyperthyroidism  -f/u TSH  -will restart home dose

## 2017-11-07 NOTE — H&P ADULT - HISTORY OF PRESENT ILLNESS
Patient is a 87 y/o F, from home, walks with a walker with PMHx of ? Hyperthyroidism ( pt on PTU at home) presents to ED c/o R hip pain, sharp, severe, since fall at 9 PM yesterday night. History has been obtained from the ED provider note as patient is in pain and says "I Dont know to all questions" and not providing any history or ROS. As per ED provider note, Patient states she was walking in living room when she slipped & fell. Patient did not use a walker tonight when she fell. Denies any head trauma, headache, neck pain, chest pain, back pain, numbness, weakness, or any other complaints. NKDA. Denies smoking. Patient is a 89 y/o F, from home, walks with a walker with PMHx of ? Hyperthyroidism ( pt on PTU at home) presents to ED c/o R hip pain, sharp, severe, since fall at 9 PM yesterday night. History has been obtained from the ED provider note as patient is in pain and says "I Dont know to all questions" and not providing any history or ROS. As per ED provider note, Patient states she was walking in living room when she slipped & fell. Patient did not use a walker tonight when she fell. Denies any head trauma, headache, neck pain, chest pain, back pain, numbness, weakness, or any other complaints. NKDA. Denies smoking.  Tried to discuss GOC with patient however she is unable to answer/comprehend questions at this time. She is in excessive pain and asking for pain relief Patient is a 87 y/o F, from home, walks with a walker with PMHx of ? Hyperthyroidism ( pt on PTU at home) presents to ED c/o R hip pain, sharp, severe, since fall at 9 PM yesterday night. History has been obtained from the ED provider note as patient is in pain and says "I Dont know to all questions" and not providing any history or ROS. As per ED provider note, Patient states she was walking in living room when she slipped & fell. Patient did not use a walker tonight when she fell. Denies any head trauma, headache, neck pain, chest pain, back pain, numbness, weakness, or any other complaints. NKDA. Denies smoking.  Tried to discuss GOC with patient however she is unable to answer/comprehend questions at this time. She is in excessive pain and asking for pain relief  Also tried to reach daughter however the number listed on [facility] is incorrect and patient does not know the correct Ph No

## 2017-11-07 NOTE — H&P ADULT - ASSESSMENT
Patient is a 89 y/o F, from home, walks with a walker with PMHx of ? Hyperthyroidism ( pt on PTU at home) presents to ED c/o R hip pain, sharp, severe, since fall at 9 PM yesterday night.     Patient has stable vitals in the ED, initially saturating well, however later saturating in low 90's on the floor without any resp distress, CXR: No focal lung consolidation.  No pneumothorax or pleural effusion. Placed on 2L NC with improvement in saturation, Labs WNL, except elevated BUN (? Dehydration), Coags WNL, type cross obtained. s/p 1 L NS and pain management in the ED. X ray Right Hip: Femoral Neck Fracture  Admitted for management of femoral neck Fracture

## 2017-11-08 NOTE — CHART NOTE - NSCHARTNOTEFT_GEN_A_CORE
Pt need surgery tomorrow. Orthopedics can not reach her daughter to get the consent for OR, will need two physicians signature for consent. , and cardio Dr. Garrison were informed.

## 2017-11-08 NOTE — PROGRESS NOTE ADULT - PROBLEM SELECTOR PLAN 2
Patient takes PTU at home, However herself id not sure about h/O Hyperthyroidism  -TSH 0.01  -f/u T3,T4  -restart home dose

## 2017-11-09 NOTE — CHART NOTE - NSCHARTNOTEFT_GEN_A_CORE
Paged by RN that currently pt has no IV access and is not cooperative with staff in putting new IV line    Primary team to follow. Pt scheduled for OR today. Paged by RN that currently pt has no IV access and is not cooperative with staff in putting new IV line.    Pt refused AM blood work.    Primary team to follow. Pt scheduled for OR today.

## 2017-11-09 NOTE — PROGRESS NOTE ADULT - ATTENDING COMMENTS
Miguel Garrison MD,FACC.  [address] [facility].  [facility].  [phone]

## 2017-11-09 NOTE — PROGRESS NOTE ADULT - ATTENDING COMMENTS
Proposed surgical procedure henok nelson and mejia rercomended to minimise risk of complications of hip fracture.Due to nature of her condition without surgery conditionj can be life threatening

## 2017-11-09 NOTE — CONSULT NOTE ADULT - ATTENDING COMMENTS
Thank you for the courtesy of the consultation,I would be available for any further discussion if needed.  Miguel Garrison MD,FACC.  [address] [place], [place] [id number] [phone]
Patient seen and evaluated  Confused  B/L LE questionable flexion contracture at knees  Right LE: Positive log roll/heel strike    Xrays : Right hip Femoral neck fracture    Plan: If patient is non-ambulatory, unlikely surgical candidate  CT hip ordered  will f/u

## 2017-11-09 NOTE — PROGRESS NOTE ADULT - PROBLEM SELECTOR PLAN 2
Patient takes PTU at home, However herself id not sure about h/O Hyperthyroidism  -TSH 0.01  -restart home dose

## 2017-11-09 NOTE — CONSULT NOTE ADULT - ASSESSMENT
Patient is a 89 y/o F, from home, walks with a walker with PMHx of Dementia, ? Hyperthyroidism ( pt on PTU at home) presents to ED c/o R hip pain, since fall at home,no LOC noted,likely mechanical,sustained RIGHT hip fracture,scheduled for ORIF.    Problem/Plan - 1:  ·  Problem: Closed fracture of right hip. Plan:Scheduled for ORIF RIGHT IT fracture,no recent history of CAD,Heart Failure or arrhythmias,.  Patient's Revised Cardiac Risk Index (RCRI) score is Class 1 (0.4 % risk) . Patient is at LOW risk of  adverse perioperative cardiac events undergoing LOW risk surgery. No further cardiac testing is needed prior to patient's surgery.   DVT prophylaxis post op.      Problem/Plan - 2:  ·  Problem: Hyperthyroidism.  Plan:On PTU,clinically Euthyroid. Patient is a 89 y/o F, from home, walks with a walker with PMHx of Dementia, ? Hyperthyroidism ( pt on PTU at home) presents to ED c/o R hip pain, since fall at home,no LOC noted,likely mechanical,sustained RIGHT hip fracture,scheduled for ORIF.    Problem/Plan - 1:  ·  Problem: Closed fracture of right hip. Plan:Scheduled for ORIF RIGHT IT fracture,no recent history of CAD,Heart Failure or arrhythmias,TTE [id number]%,Normal LV Systolic Function.  Patient's Revised Cardiac Risk Index (RCRI) score is Class 1 (0.4 % risk) . Patient is at LOW risk of  adverse perioperative cardiac events undergoing LOW risk surgery. No further cardiac testing is needed prior to patient's surgery.   DVT prophylaxis post op.      Problem/Plan - 2:  ·  Problem: Hyperthyroidism.  Plan:On PTU,clinically Euthyroid.

## 2017-11-09 NOTE — CONSULT NOTE ADULT - SUBJECTIVE AND OBJECTIVE BOX
CHIEF COMPLAINT: Right Hip Pain s/p Fall    HPI:--Patient is a 87 y/o F, from home, walks with a walker with PMHx of Dementia, Hyperthyroidism ( pt on PTU at home) presents to ED c/o R hip pain, sharp, severe, since fall at home.  As per ED provider note, patient states she was walking in living room when she slipped & fell. Patient did not use her  walker when she fell. Denies any head trauma, headache, neck pain, chest pain, back pain, numbness, weakness, or any other complaints. No LOC reported,sustained RIGHT hip fracture scheduled for ORIF.No recent history of CAD,Heart Failure noted.c/o right hip pain,she is a poor historian,no dyspnea,denies chest pain.      PAST MEDICAL & SURGICAL HISTORY:  Psoriasis  Dementia  Hyperthyroidism?  No significant past surgical history      MEDICATIONS  (STANDING):  dextrose 5%. 1000 milliLiter(s) (50 mL/Hr) IV Continuous <Continuous>  enoxaparin Injectable 40 milliGRAM(s) SubCutaneous daily  insulin lispro (HumaLOG) corrective regimen sliding scale   SubCutaneous three times a day before meals  propylthiouracil 50 milliGRAM(s) Oral daily  sodium chloride 0.9%. 1000 milliLiter(s) (60 mL/Hr) IV Continuous <Continuous>    MEDICATIONS  (PRN):  dextrose Gel 1 Dose(s) Oral once PRN Blood Glucose LESS THAN 70 milliGRAM(s)/deciLiter  glucagon  Injectable 1 milliGRAM(s) IntraMuscular once PRN Glucose <70 milliGRAM(s)/deciLiter  ketorolac 10 milliGRAM(s) Oral every 6 hours PRN Moderate Pain (4 - 6)  morphine  - Injectable 2 milliGRAM(s) IV Push every 6 hours PRN Severe Pain (7 - 10)      FAMILY HISTORY:  No family history of premature coronary artery disease or sudden cardiac death    SOCIAL HISTORY:  Smoking-Non smoker  Alcohol-Denies  Ilicit Drug use-Denies    REVIEW OF SYSTEMS:  Constitutional: [ ] fever, [ ]weight loss, [x ]fatigue Activity [ ] Bedbound,[x ] Ambulates [ ] Unassisted[x] Cane/Walker [x ] Assistence.  Eyes: [ ] visual changes  Respiratory: [ ]shortness of breath;  [ ] cough, [ ]wheezing, [ ]chills, [ ]hemoptysis  Cardiovascular: [ ] chest pain, [ ]palpitations, [ ]dizziness,  [ ]leg swelling[ ]orthopnea [ ]PND  Gastrointestinal: [ ] abdominal pain, [ ]nausea, [ ]vomiting,  [ ]diarrhea,[ ]constipation  Genitourinary: [ ] dysuria, [ ] hematuria  Neurologic: [ ] headaches [ ] tremors[ ] weakness  Skin: [ ] itching, [ ]burning, [ ] rashes  Endocrine: [ ] heat or cold intolerance  Musculoskeletal: [x ] joint pain or swelling; [ ] muscle, back, or extremity pain  Psychiatric: [x ] depression, [ ]anxiety, [ ]mood swings, or [ ]difficulty sleeping  Hematologic: [ ] easy bruising, [ ] bleeding gums       [ x] All others negative	  [ ] Unable to obtain    Vital Signs Last 24 Hrs  T(C): 36.6 (09 Nov 2017 04:50), Max: 36.7 (08 Nov 2017 14:34)  T(F): 97.8 (09 Nov 2017 04:50), Max: 98.1 (08 Nov 2017 14:34)  HR: 85 (09 Nov 2017 04:50) (55 - 104)  BP: 159/74 (09 Nov 2017 04:50) (135/100 - 159/78)  RR: 18 (09 Nov 2017 04:50) (18 - 18)  SpO2: 97% (09 Nov 2017 04:50) (95% - 97%)  I&O's Summary      PHYSICAL EXAM:  General: No acute distress BMI-23  HEENT: EOMI, PERRL[ ] Icteric  Neck: Supple, No JVD  Lungs: Fair air entry bilaterally; [ ] Rales [ ] Rhonchi [ ] Wheezing  Heart: Regular rate and rhythm;[x ] Murmurs-  2 /6 [x ] Systolic [ ] Diastolic [ ] Radiation,No rubs, or gallops  Abdomen: Nontender, bowel sounds present  Extremities: No clubbing, cyanosis, or edema[ ] Calf tenderness.[x] Right LE Ext rotated  Nervous system:  Alert & Oriented X2, no focal deficits  Psychiatric: Normal affect  Skin: No rashes ,Hypopigmented  lesions lower ext.      LABS:  11-08    140  |  108  |  19<H>  ----------------------------<  94  4.0   |  27  |  0.32<L>    Ca    8.5      08 Nov 2017 07:33  Phos  1.8     11-08  Mg     2.0     11-08    TPro  5.7<L>  /  Alb  2.7<L>  /  TBili  1.3<H>  /  DBili  0.3<H>  /  AST  24  /  ALT  18  /  AlkPhos  66  11-08    Creatinine Trend: 0.32<--, 0.44<--                        11.8   12.4  )-----------( 135      ( 08 Nov 2017 07:33 )             35.6     Lipid Panel: Cholesterol, Serum 133  Direct LDL 62  HDL Cholesterol, Serum 58  Triglycerides, Serum 67    Cardiac Enzymes: CARDIAC MARKERS ( 07 Nov 2017 10:50 )  0.034 ng/mL / x     / 175 U/L / x     / 3.6 ng/mL      Thyroid Stimulating Hormone, Serum: 0.01 uU/mL    Free Thyroxine, Serum: 0.8 ng/dL    11-08 [id number] 5.3      RADIOLOGY: CT LWR EXT RTIMPRESSION:  Comminuted intertrochanteric fracture of the right proximal femur. Right hip osteoarthritis.    CHEST SINGLE AP OR PA     IMPRESSION:No focal lung consolidation. No pneumothorax or pleural effusion.    ECG [my interpretation]:Sinus Rhythm at 94 bpm RAD Non specific ST abnormalities.    TELEMETRY:Sinus rhythm no arrhythmias.    ECHO: CHIEF COMPLAINT: Right Hip Pain s/p Fall    HPI:--Patient is a 89 y/o F, from home, walks with a walker with PMHx of Dementia, Hyperthyroidism ( pt on PTU at home) presents to ED c/o R hip pain, sharp, severe, since fall at home.  As per ED provider note, patient states she was walking in living room when she slipped & fell. Patient did not use her  walker when she fell. Denies any head trauma, headache, neck pain, chest pain, back pain, numbness, weakness, or any other complaints. No LOC reported,sustained RIGHT hip fracture scheduled for ORIF.No recent history of CAD,Heart Failure noted.c/o right hip pain,she is a poor historian,no dyspnea,denies chest pain.      PAST MEDICAL & SURGICAL HISTORY:  Psoriasis  Dementia  Hyperthyroidism?  No significant past surgical history      MEDICATIONS  (STANDING):  dextrose 5%. 1000 milliLiter(s) (50 mL/Hr) IV Continuous <Continuous>  enoxaparin Injectable 40 milliGRAM(s) SubCutaneous daily  insulin lispro (HumaLOG) corrective regimen sliding scale   SubCutaneous three times a day before meals  propylthiouracil 50 milliGRAM(s) Oral daily  sodium chloride 0.9%. 1000 milliLiter(s) (60 mL/Hr) IV Continuous <Continuous>    MEDICATIONS  (PRN):  dextrose Gel 1 Dose(s) Oral once PRN Blood Glucose LESS THAN 70 milliGRAM(s)/deciLiter  glucagon  Injectable 1 milliGRAM(s) IntraMuscular once PRN Glucose <70 milliGRAM(s)/deciLiter  ketorolac 10 milliGRAM(s) Oral every 6 hours PRN Moderate Pain (4 - 6)  morphine  - Injectable 2 milliGRAM(s) IV Push every 6 hours PRN Severe Pain (7 - 10)      FAMILY HISTORY:  No family history of premature coronary artery disease or sudden cardiac death    SOCIAL HISTORY:  Smoking-Non smoker  Alcohol-Denies  Ilicit Drug use-Denies    REVIEW OF SYSTEMS:  Constitutional: [ ] fever, [ ]weight loss, [x ]fatigue Activity [ ] Bedbound,[x ] Ambulates [ ] Unassisted[x] Cane/Walker [x ] Assistence.  Eyes: [ ] visual changes  Respiratory: [ ]shortness of breath;  [ ] cough, [ ]wheezing, [ ]chills, [ ]hemoptysis  Cardiovascular: [ ] chest pain, [ ]palpitations, [ ]dizziness,  [ ]leg swelling[ ]orthopnea [ ]PND  Gastrointestinal: [ ] abdominal pain, [ ]nausea, [ ]vomiting,  [ ]diarrhea,[ ]constipation  Genitourinary: [ ] dysuria, [ ] hematuria  Neurologic: [ ] headaches [ ] tremors[ ] weakness  Skin: [ ] itching, [ ]burning, [ ] rashes  Endocrine: [ ] heat or cold intolerance  Musculoskeletal: [x ] joint pain or swelling; [ ] muscle, back, or extremity pain  Psychiatric: [x ] depression, [ ]anxiety, [ ]mood swings, or [ ]difficulty sleeping  Hematologic: [ ] easy bruising, [ ] bleeding gums       [ x] All others negative	  [ ] Unable to obtain    Vital Signs Last 24 Hrs  T(C): 36.6 (09 Nov 2017 04:50), Max: 36.7 (08 Nov 2017 14:34)  T(F): 97.8 (09 Nov 2017 04:50), Max: 98.1 (08 Nov 2017 14:34)  HR: 85 (09 Nov 2017 04:50) (55 - 104)  BP: 159/74 (09 Nov 2017 04:50) (135/100 - 159/78)  RR: 18 (09 Nov 2017 04:50) (18 - 18)  SpO2: 97% (09 Nov 2017 04:50) (95% - 97%)  I&O's Summary      PHYSICAL EXAM:  General: No acute distress BMI-23  HEENT: EOMI, PERRL[ ] Icteric  Neck: Supple, No JVD  Lungs: Fair air entry bilaterally; [ ] Rales [ ] Rhonchi [ ] Wheezing  Heart: Regular rate and rhythm;[x ] Murmurs-  2 /6 [x ] Systolic [ ] Diastolic [ ] Radiation,No rubs, or gallops  Abdomen: Nontender, bowel sounds present  Extremities: No clubbing, cyanosis, or edema[ ] Calf tenderness.[x] Right LE Ext rotated  Nervous system:  Alert & Oriented X2, no focal deficits  Psychiatric: Normal affect  Skin: No rashes ,Hypopigmented  lesions lower ext.      LABS:  11-08    140  |  108  |  19<H>  ----------------------------<  94  4.0   |  27  |  0.32<L>    Ca    8.5      08 Nov 2017 07:33  Phos  1.8     11-08  Mg     2.0     11-08    TPro  5.7<L>  /  Alb  2.7<L>  /  TBili  1.3<H>  /  DBili  0.3<H>  /  AST  24  /  ALT  18  /  AlkPhos  66  11-08    Creatinine Trend: 0.32<--, 0.44<--                        11.8   12.4  )-----------( 135      ( 08 Nov 2017 07:33 )             35.6     Lipid Panel: Cholesterol, Serum 133  Direct LDL 62  HDL Cholesterol, Serum 58  Triglycerides, Serum 67    Cardiac Enzymes: CARDIAC MARKERS ( 07 Nov 2017 10:50 )  0.034 ng/mL / x     / 175 U/L / x     / 3.6 ng/mL      Thyroid Stimulating Hormone, Serum: 0.01 uU/mL    Free Thyroxine, Serum: 0.8 ng/dL    11-08 [id number] 5.3      RADIOLOGY: CT LWR EXT RTIMPRESSION:  Comminuted intertrochanteric fracture of the right proximal femur. Right hip osteoarthritis.    CHEST SINGLE AP OR PA     IMPRESSION:No focal lung consolidation. No pneumothorax or pleural effusion.    ECG [my interpretation]:Sinus Rhythm at 94 bpm RAD Non specific ST abnormalities.    TELEMETRY:Sinus rhythm no arrhythmias.    ECHO:PRELIMINARY:-EF 55%,Normal LV Systolic Function,No significant valvular abnormalities noted.

## 2017-11-10 NOTE — PROGRESS NOTE ADULT - PROBLEM SELECTOR PROBLEM 1
Closed fracture of right hip, initial encounter
Acute hip pain, right

## 2017-11-10 NOTE — PHYSICAL THERAPY INITIAL EVALUATION ADULT - DIAGNOSIS, PT EVAL
Patient presented with pain,generalized weakness, limited ROM/muscle strength, impaired sitting balance and was unable to perform ambulation at this time

## 2017-11-10 NOTE — PROGRESS NOTE ADULT - PROBLEM SELECTOR PLAN 1
Closed right hip fracture  -x ray hip: Rt femoral neck fracutre  -EKG: TWI in inferior leads, however no CP, Negative T1  -f/u TTE  -f/u dopper LE r/o DVT  -Ortho consult requested, plan OR on 11/09 AM
Closed right hip fracture  -x ray hip: Rt femoral neck fracutre  -EKG: TWI in inferior leads, however no CP, Negative T1, T2  -f/u TTE  -dopper LE r/o DVT, cancelled by US department due to pt cannot tolerate the test.  -Ortho consult requested, plan OR on 11/09 AM
Closed right hip fracture  -x ray hip: Rt femoral neck fracutre  -EKG: TWI in inferior leads, however no CP, Negative T1, T2  -f/u TTE  -dopper LE r/o DVT, cancelled by US department due to pt cannot tolerate the test.  -Ortho consult requested, plan OR on 11/09 AM
- iv acetaminophen atc for 4 doses  - dc toradol po  - toradol iv for 24 hours  - avoid opioids at this time  - oob

## 2017-11-10 NOTE — PHYSICAL THERAPY INITIAL EVALUATION ADULT - LIVES WITH, PROFILE
Patient was unable to provide information;unable to reach through phone as there was no correct number

## 2017-11-12 NOTE — PROGRESS NOTE ADULT - PROVIDER SPECIALTY LIST ADULT
Cardiology
Internal Medicine
Orthopedics
Pain Medicine

## 2017-11-12 NOTE — PROGRESS NOTE ADULT - SUBJECTIVE AND OBJECTIVE BOX
88yFemale    Diagnosis:  S/p R Hip IM Nailing POD#1    Patient was seen and evaluated at bedside. Patient with no acute complaints.   Pain is  well controlled.  Denies CP/SOB, dyspnea, paresthesias, N/V/D, palpitations.     Vital Signs Last 24 Hrs  T(C): 36.8 (10 Nov 2017 05:11), Max: 36.9 (10 Nov 2017 00:48)  T(F): 98.2 (10 Nov 2017 05:11), Max: 98.4 (10 Nov 2017 00:48)  HR: 78 (10 Nov 2017 05:11) (65 - 92)  BP: 161/69 (10 Nov 2017 05:11) (100/64 - 161/69)  BP(mean): 86 (09 Nov 2017 14:54) (69 - 86)  RR: 17 (10 Nov 2017 05:11) (16 - 24)  SpO2: 95% (10 Nov 2017 05:11) (95% - 100%)  I&O's Detail    09 Nov 2017 07:01  -  10 Nov 2017 07:00  --------------------------------------------------------  IN:    0.9% NaCl: 250 mL    Lactated Ringers IV Bolus: 1500 mL    lactated ringers.: 100 mL    Packed Red Blood Cells: 250 mL  Total IN: 2100 mL    OUT:    Estimated Blood Loss: 150 mL    Indwelling Catheter - Urethral: 1250 mL    Indwelling Catheter - Urethral: 700 mL  Total OUT: 2100 mL    Total NET: 0 mL          Physical Exam:    General: AAOx3, NAD, resting comfortably in bed.    R Hip:  Dressing is C/D/I. Skin is pink and warm. Staples intact. No erythema. SILT.  Wound with no drainage, healing well.   Lower extremity:  No calf tenderness, calves are soft. NVI. EHL/TA/gastrocnemius intact B/L.  Good capillary refill. Warm, well-perfused.               Impression:  88yFemale S/p R Hip IM Nailing POD#1  Plan:  -  Pain management  -  Dvt prophylaxis with Lovenox  -  Daily Physical Theapy:  WBAT   -  Discharge planning: Home Vs. Rehab pending Physical therapy eval.  -  Continue with Post-op Antibiotics x 24hrs  -  Discontinue Tejeda catheter today
88yFemale    Diagnosis:  S/p R Hip IM Nailing POD#2    Patient was seen and evaluated at bedside. Patient with no acute complaints.   Pain is  well controlled.  Denies CP/SOB, dyspnea, paresthesias, N/V/D, palpitations.     Vital Signs Last 24 Hrs  T(C): 36.4 (11 Nov 2017 06:07), Max: 37 (10 Nov 2017 22:38)  T(F): 97.5 (11 Nov 2017 06:07), Max: 98.6 (10 Nov 2017 22:38)  HR: 94 (11 Nov 2017 11:06) (82 - 108)  BP: 123/95 (11 Nov 2017 11:06) (121/64 - 139/73)  BP(mean): --  RR: 16 (11 Nov 2017 06:07) (16 - 17)  SpO2: 97% (11 Nov 2017 11:06) (97% - 99%)  I&O's Detail    10 Nov 2017 07:01  -  11 Nov 2017 07:00  --------------------------------------------------------  IN:  Total IN: 0 mL    OUT:    Indwelling Catheter - Urethral: 600 mL  Total OUT: 600 mL    Total NET: -600 mL          Physical Exam:    General: AAOx3, NAD, resting comfortably in bed.    R Hip:  Dressing is C/D/I. Skin is pink and warm. Staples intact. No erythema. SILT.  Wound with no drainage, healing well.   Lower extremity:  No calf tenderness, calves are soft. 2+pulses. NVI.  EHL/TA/gastrocnemius intact B/L.  Good capillary refill. Warm, well-perfused.                           12.2   14.8  )-----------( 127      ( 10 Nov 2017 09:14 )             37.2     11-10    141  |  109<H>  |  14  ----------------------------<  90  4.0   |  26  |  0.33<L>    Ca    8.0<L>      10 Nov 2017 09:38        Impression:  88yFemale S/p R Hip IM Nailing POD#2  Plan:  -  Pain management  -  Dvt prophylaxis with Lovenox  -  Daily Physical Theapy:  WBAT  -  Discharge planning: Home Vs. Rehab pending Physical therapy eval.  -  Dressing changed  -  Patient is Orthopaedically stable  -  D/c staples 11/24/17  -  Patient to follow up with Dr. Delgado upon d/c from rehab at [phone]
Attending Attestation:   Proposed surgical procedure  is medically nessesary and higly reccomended to minimise risk of complications of untreated hip fracture.Due to nature of her condition without surgery condition can be life threatening . Agree with 2 Physician consent to proceed with surgery.
INTERVAL HPI/OVERNIGHT EVENTS:no acute events,awake,episodic pain,some confusion    VITAL SIGNS:  T(F): 97.5 (11-11-17 @ 06:07)  HR: 88 (11-11-17 @ 06:07)  BP: 131/70 (11-11-17 @ 06:07)  RR: 16 (11-11-17 @ 06:07)  SpO2: 99% (11-11-17 @ 06:07)  Wt(kg): --    PHYSICAL EXAM:awake    Constitutional:  Eyes:  ENMT:perrla,eom intact  Neck:  Respiratory:clear  Cardiovascular:s1 s2,m-none  Gastrointestinal:soft,non-tender  Extremities:r l/extrem with mild tenderness,decrease rom  Vascular:  Neurological:  Musculoskeletal:    MEDICATIONS  (STANDING):  ascorbic acid 500 milliGRAM(s) Oral two times a day  dextrose 5%. 1000 milliLiter(s) (50 mL/Hr) IV Continuous <Continuous>  dextrose 50% Injectable 12.5 Gram(s) IV Push once  dextrose 50% Injectable 25 Gram(s) IV Push once  dextrose 50% Injectable 25 Gram(s) IV Push once  docusate sodium 100 milliGRAM(s) Oral three times a day  enoxaparin Injectable 40 milliGRAM(s) SubCutaneous daily  ferrous    sulfate 325 milliGRAM(s) Oral three times a day with meals  folic acid 1 milliGRAM(s) Oral daily  multivitamin 1 Tablet(s) Oral daily  propylthiouracil 50 milliGRAM(s) Oral daily  sodium chloride 0.9%. 1000 milliLiter(s) (60 mL/Hr) IV Continuous <Continuous>    MEDICATIONS  (PRN):  aluminum hydroxide/magnesium hydroxide/simethicone Suspension 30 milliLiter(s) Oral four times a day PRN Indigestion  dextrose Gel 1 Dose(s) Oral once PRN Blood Glucose LESS THAN 70 milliGRAM(s)/deciLiter  glucagon  Injectable 1 milliGRAM(s) IntraMuscular once PRN Glucose <70 milliGRAM(s)/deciLiter  ketorolac   Injectable 15 milliGRAM(s) IV Push every 6 hours PRN Moderate Pain (4 - 6)  morphine  - Injectable 2 milliGRAM(s) IV Push every 6 hours PRN Severe Pain (7 - 10)      Allergies    No Known Allergies    Intolerances        LABS:                        12.2   14.8  )-----------( 127      ( 10 Nov 2017 09:14 )             37.2     11-10    141  |  109<H>  |  14  ----------------------------<  90  4.0   |  26  |  0.33<L>    Ca    8.0<L>      10 Nov 2017 09:38          Assessment and Plan:   · Assessment		  Patient is a 87 y/o F, from home, walks with a walker with PMHx of ? Hyperthyroidism ( pt on PTU at home) presents to ED c/o R hip pain, sharp, severe, since fall at 9 PM yesterday night. Patient has stable vitals in the ED, initially saturating well, however later saturating in low 90's on the floor without any resp distress, CXR: No focal lung consolidation.    No pneumothorax or pleural effusion. Placed on 2L NC with improvement in saturation, Labs WNL, except elevated BUN (? Dehydration), Coags WNL, type cross obtained. s/p 1 L NS and pain management in the ED. X ray Right Hip: Femoral Neck Fracture, admitted for management of femoral neck Fracture.       Problem/Plan - 1:  ·  Problem: Closed fracture of right hip, initial encounter.  Plan: Closed right hip fracture  -x ray hip: Rt femoral neck fracutre  -s/p pod #3  -pain control  -ortho- f/up  -pt f/up  -d/c plan recomendec to [facility]     Problem/Plan - 2:  ·  Problem: Hyperthyroidism.  Plan: Patient takes PTU at home, However herself id not sure about h/O Hyperthyroidism  -TSH 0.01  -restart home dose.      Problem/Plan - 3:  ·  Problem: Prophylactic measure.  Plan: IMPROVE VTE score:2   -need DVT ppx, on lovenox
INTERVAL HPI/OVERNIGHT EVENTS:no acute evtns for overnight    VITAL SIGNS:  T(F): 97.8 (11-09-17 @ 04:50)  HR: 85 (11-09-17 @ 04:50)  BP: 159/74 (11-09-17 @ 04:50)  RR: 18 (11-09-17 @ 04:50)  SpO2: 97% (11-09-17 @ 04:50)  Wt(kg): --    PHYSICAL EXAM:  awake,poor historian  Constitutional:  Eyes:  ENMT:perrla  Neck:  Respiratory:clear  Cardiovascular:s1 s2,m-none  Gastrointestinal:soft non-tender  Extremities:  Vascular:  Neurological:  Musculoskeletal:    MEDICATIONS  (STANDING):  dextrose 5%. 1000 milliLiter(s) (50 mL/Hr) IV Continuous <Continuous>  dextrose 50% Injectable 12.5 Gram(s) IV Push once  dextrose 50% Injectable 25 Gram(s) IV Push once  dextrose 50% Injectable 25 Gram(s) IV Push once  enoxaparin Injectable 40 milliGRAM(s) SubCutaneous daily  insulin lispro (HumaLOG) corrective regimen sliding scale   SubCutaneous three times a day before meals  propylthiouracil 50 milliGRAM(s) Oral daily  sodium chloride 0.9%. 1000 milliLiter(s) (60 mL/Hr) IV Continuous <Continuous>    MEDICATIONS  (PRN):  dextrose Gel 1 Dose(s) Oral once PRN Blood Glucose LESS THAN 70 milliGRAM(s)/deciLiter  glucagon  Injectable 1 milliGRAM(s) IntraMuscular once PRN Glucose <70 milliGRAM(s)/deciLiter  ketorolac 10 milliGRAM(s) Oral every 6 hours PRN Moderate Pain (4 - 6)  morphine  - Injectable 2 milliGRAM(s) IV Push every 6 hours PRN Severe Pain (7 - 10)      Allergies    No Known Allergies    Intolerances        LABS:                        11.8   12.4  )-----------( 135      ( 08 Nov 2017 07:33 )             35.6     11-08    140  |  108  |  19<H>  ----------------------------<  94  4.0   |  27  |  0.32<L>    Ca    8.5      08 Nov 2017 07:33  Phos  1.8     11-08  Mg     2.0     11-08    TPro  5.7<L>  /  Alb  2.7<L>  /  TBili  1.3<H>  /  DBili  0.3<H>  /  AST  24  /  ALT  18  /  AlkPhos  66  11-08        Assessment and Plan:   · Assessment		  Patient is a 89 y/o F, from home, walks with a walker with PMHx of ? Hyperthyroidism ( pt on PTU at home) presents to ED c/o R hip pain, sharp, severe, since fall at 9 PM yesterday night. Patient has stable vitals in the ED, initially saturating well, however later saturating in low 90's on the floor without any resp distress, CXR: No focal lung consolidation.    No pneumothorax or pleural effusion. Placed on 2L NC with improvement in saturation, Labs WNL, except elevated BUN (? Dehydration), Coags WNL, type cross obtained. s/p 1 L NS and pain management in the ED. X ray Right Hip: Femoral Neck Fracture, admitted for management of femoral neck Fracture.       Problem/Plan - 1:  ·  Problem: Closed fracture of right hip, initial encounter.  Plan: Closed right hip fracture  -x ray hip: Rt femoral neck fracutre  -f/u TTE  -Ortho consult requested, plan OR today  -cardiology evaluation and clearence appretiate  -PATIENT MEDICALLY CLEARED FOR LOW TO MODERATE RISK PROCEDURE     Problem/Plan - 2:  ·  Problem: Hyperthyroidism.    Plan: Patient takes PTU at home, However herself id not sure about h/O Hyperthyroidism  -restart home dose.      Problem/Plan - 3:  ·  Problem: Prophylactic measure.  Plan: IMPROVE VTE score:2   -need DVT ppx, on lovenox  -no need for GI ppx.
INTERVAL HPI/OVERNIGHT EVENTS:patient awake,c/o pain,s/o surgery day #2    VITAL SIGNS:  T(F): 98.2 (11-10-17 @ 05:11)  HR: 78 (11-10-17 @ 05:11)  BP: 161/69 (11-10-17 @ 05:11)  RR: 17 (11-10-17 @ 05:11)  SpO2: 95% (11-10-17 @ 05:11)  Wt(kg): --    PHYSICAL EXAM:  awake,poor historian  Constitutional:  Eyes:  ENMT:perrla  Neck:  Respiratory:clear  Cardiovascular:s1s2,m-none  Gastrointestinal:soft,non-tender  Extremities:mild edema  Vascular:  Neurological:  Musculoskeletal:    MEDICATIONS  (STANDING):  ascorbic acid 500 milliGRAM(s) Oral two times a day  dextrose 5%. 1000 milliLiter(s) (50 mL/Hr) IV Continuous <Continuous>  dextrose 50% Injectable 12.5 Gram(s) IV Push once  dextrose 50% Injectable 25 Gram(s) IV Push once  dextrose 50% Injectable 25 Gram(s) IV Push once  docusate sodium 100 milliGRAM(s) Oral three times a day  enoxaparin Injectable 40 milliGRAM(s) SubCutaneous daily  ferrous    sulfate 325 milliGRAM(s) Oral three times a day with meals  folic acid 1 milliGRAM(s) Oral daily  multivitamin 1 Tablet(s) Oral daily  propylthiouracil 50 milliGRAM(s) Oral daily  sodium chloride 0.9%. 1000 milliLiter(s) (60 mL/Hr) IV Continuous <Continuous>    MEDICATIONS  (PRN):  acetaminophen   Tablet 650 milliGRAM(s) Oral every 6 hours PRN For Temp over 38.3 C (100.94 F)  aluminum hydroxide/magnesium hydroxide/simethicone Suspension 30 milliLiter(s) Oral four times a day PRN Indigestion  dextrose Gel 1 Dose(s) Oral once PRN Blood Glucose LESS THAN 70 milliGRAM(s)/deciLiter  glucagon  Injectable 1 milliGRAM(s) IntraMuscular once PRN Glucose <70 milliGRAM(s)/deciLiter  ketorolac 10 milliGRAM(s) Oral every 6 hours PRN Moderate Pain (4 - 6)  morphine  - Injectable 2 milliGRAM(s) IV Push every 6 hours PRN Severe Pain (7 - 10)      Allergies    No Known Allergies    Intolerances        Assessment and Plan:   · Assessment		  Patient is a 87 y/o F, from home, walks with a walker with PMHx of ? Hyperthyroidism ( pt on PTU at home) presents to ED c/o R hip pain, sharp, severe, since fall at 9 PM yesterday night. Patient has stable vitals in the ED, initially saturating well, however later saturating in low 90's on the floor without any resp distress, CXR: No focal lung consolidation.    No pneumothorax or pleural effusion. Placed on 2L NC with improvement in saturation, Labs WNL, except elevated BUN (? Dehydration), Coags WNL, type cross obtained. s/p 1 L NS and pain management in the ED. X ray Right Hip: Femoral Neck Fracture, admitted for management of femoral neck Fracture.       Problem/Plan - 1:  ·  Problem: Closed fracture of right hip, initial encounter.  Plan: Closed right hip fracture  -x ray hip: Rt femoral neck fracutre  -s/p pod #2  -pain control  -ortho- f/up  -pt f/up     Problem/Plan - 2:  ·  Problem: Hyperthyroidism.  Plan: Patient takes PTU at home, However herself id not sure about h/O Hyperthyroidism  -TSH 0.01  -restart home dose.      Problem/Plan - 3:  ·  Problem: Prophylactic measure.  Plan: IMPROVE VTE score:2   -need DVT ppx, on lovenox
Ortho Note POD# 3  88yFemale    Diagnosis:  S/p IM nailing Right hip fx POD# 3    Patient is seen and evaluated at bedside; offers no acute complaints. Pain is mild; well controlled.  Has been OOB with PT; ambulation with walker    Vital Signs Last 24 Hrs  T(C): 37.4 (12 Nov 2017 05:38), Max: 37.4 (12 Nov 2017 05:38)  T(F): 99.3 (12 Nov 2017 05:38), Max: 99.3 (12 Nov 2017 05:38)  HR: 92 (12 Nov 2017 11:47) (87 - 101)  BP: 122/79 (12 Nov 2017 11:47) (110/83 - 145/64)  BP(mean): --  RR: 16 (12 Nov 2017 05:38) (16 - 17)  SpO2: 95% (12 Nov 2017 11:47) (95% - 100%)    Physical Exam:    General: AAOx3, in NAD, resting in bed.    Right hip:  In nl. alignment. Wound C/D/I; healing well.  Staples intact. Calves are soft, non-tender. 2+pulses. NVI.                          9.6    11.1  )-----------( 179      ( 12 Nov 2017 11:02 )             30.3     11-12    142  |  108  |  29<H>  ----------------------------<  123<H>  3.7   |  28  |  0.36<L>    Ca    8.3<L>      12 Nov 2017 11:02        Impression:  88yFemale S/p Im nailing Right hip fx POD# 3  Plan:  -  Continue pain management and home meds  -  DVT prophylaxis with Lovenox  -  Daily Physical Therapy:  WBAT on RLE with walker  -  Orthopedically stable for Rehab placement  -  Dressing changed  -  D/c Lovenox and d/c staples on 11/23/17 and Apply Steri-Strips  -  Encouraged use of incentive spirometer  -  Case d/w , Follow up in office after discharge  call for an appointment [phone]
PGY1 Note discussed with supervising resident and primary attending.    Patient is a 88y old  Female who presents with a chief complaint of Mechanical fall, right hip fracture (07 Nov 2017 09:03)      INTERVAL HPI/OVERNIGHT EVENTS: No overnight events.     MEDICATIONS  (STANDING):  dextrose 5%. 1000 milliLiter(s) (50 mL/Hr) IV Continuous <Continuous>  dextrose 50% Injectable 12.5 Gram(s) IV Push once  dextrose 50% Injectable 25 Gram(s) IV Push once  dextrose 50% Injectable 25 Gram(s) IV Push once  insulin lispro (HumaLOG) corrective regimen sliding scale   SubCutaneous three times a day before meals  sodium chloride 0.9%. 1000 milliLiter(s) (60 mL/Hr) IV Continuous <Continuous>    MEDICATIONS  (PRN):  dextrose Gel 1 Dose(s) Oral once PRN Blood Glucose LESS THAN 70 milliGRAM(s)/deciLiter  glucagon  Injectable 1 milliGRAM(s) IntraMuscular once PRN Glucose <70 milliGRAM(s)/deciLiter  ketorolac   Injectable 15 milliGRAM(s) IV Push every 8 hours PRN Moderate Pain (4 - 6)  morphine  - Injectable 2 milliGRAM(s) IV Push every 6 hours PRN Severe Pain (7 - 10)      Allergies    No Known Allergies    Intolerances        REVIEW OF SYSTEMS:  CONSTITUTIONAL: No fever, weight loss, or fatigue  RESPIRATORY: No cough, wheezing, chills or hemoptysis; No shortness of breath  CARDIOVASCULAR: No chest pain, palpitations, dizziness, or leg swelling  GASTROINTESTINAL: No abdominal or epigastric pain. No nausea, vomiting, or hematemesis; No diarrhea or constipation. No melena or hematochezia.  NEUROLOGICAL: No headaches, memory loss, loss of strength, numbness, or tremors  SKIN: No itching, burning, rashes, or lesions     Vital Signs Last 24 Hrs  T(C): 36.8 (07 Nov 2017 04:45), Max: 37.2 (07 Nov 2017 03:22)  T(F): 98.2 (07 Nov 2017 04:45), Max: 98.9 (07 Nov 2017 03:22)  HR: 97 (07 Nov 2017 04:45) (66 - 97)  BP: 158/86 (07 Nov 2017 04:45) (150/84 - 182/73)  BP(mean): --  RR: 17 (07 Nov 2017 04:45) (17 - 20)  SpO2: 92% (07 Nov 2017 04:45) (92% - 99%)    PHYSICAL EXAM:  GENERAL: NAD, well-groomed, well-developed  HEAD:  Atraumatic, Normocephalic  EYES: EOMI, PERRLA, conjunctiva and sclera clear  NECK: Supple, No JVD, Normal thyroid  CHEST/LUNG: Clear to percussion bilaterally; No rales, rhonchi, wheezing, or rubs  HEART: Regular rate and rhythm; No murmurs, rubs, or gallops  ABDOMEN: Soft, Nontender, Nondistended; Bowel sounds present  NERVOUS SYSTEM:  Alert & Oriented X3, Good concentration; Motor Strength 5/5 B/L   EXTREMITIES:  2+ Peripheral Pulses, No clubbing, cyanosis, or edema  SKIN;    LABS:                        13.5   9.2   )-----------( 220      ( 07 Nov 2017 00:27 )             43.1     11-07    141  |  108  |  29<H>  ----------------------------<  123<H>  4.4   |  28  |  0.44<L>    Ca    8.7      07 Nov 2017 00:27    TPro  6.3  /  Alb  3.0<L>  /  TBili  0.3  /  DBili  x   /  AST  13  /  ALT  20  /  AlkPhos  75  11-07    PT/INR - ( 07 Nov 2017 00:27 )   PT: 11.4 sec;   INR: 1.04 ratio         PTT - ( 07 Nov 2017 00:27 )  PTT:30.9 sec    CAPILLARY BLOOD GLUCOSE          RADIOLOGY & ADDITIONAL TESTS:    Imaging Personally Reviewed:  [ x] YES  [ ] NO    Consultant(s) Notes Reviewed:  [x ] YES  [ ] NO
PGY1 Note discussed with supervising resident and primary attending.    Patient is a 88y old  Female who presents with a chief complaint of Mechanical fall, right hip fracture (07 Nov 2017 09:03)      INTERVAL HPI/OVERNIGHT EVENTS: No overnight events.    MEDICATIONS  (STANDING):  ascorbic acid 500 milliGRAM(s) Oral two times a day  ceFAZolin   IVPB 2000 milliGRAM(s) IV Intermittent every 8 hours  dextrose 5%. 1000 milliLiter(s) (50 mL/Hr) IV Continuous <Continuous>  dextrose 50% Injectable 12.5 Gram(s) IV Push once  dextrose 50% Injectable 25 Gram(s) IV Push once  dextrose 50% Injectable 25 Gram(s) IV Push once  docusate sodium 100 milliGRAM(s) Oral three times a day  enoxaparin Injectable 40 milliGRAM(s) SubCutaneous daily  ferrous    sulfate 325 milliGRAM(s) Oral three times a day with meals  folic acid 1 milliGRAM(s) Oral daily  multivitamin 1 Tablet(s) Oral daily  propylthiouracil 50 milliGRAM(s) Oral daily  sodium chloride 0.9%. 1000 milliLiter(s) (60 mL/Hr) IV Continuous <Continuous>    MEDICATIONS  (PRN):  acetaminophen   Tablet 650 milliGRAM(s) Oral every 6 hours PRN For Temp over 38.3 C (100.94 F)  aluminum hydroxide/magnesium hydroxide/simethicone Suspension 30 milliLiter(s) Oral four times a day PRN Indigestion  dextrose Gel 1 Dose(s) Oral once PRN Blood Glucose LESS THAN 70 milliGRAM(s)/deciLiter  glucagon  Injectable 1 milliGRAM(s) IntraMuscular once PRN Glucose <70 milliGRAM(s)/deciLiter  ketorolac 10 milliGRAM(s) Oral every 6 hours PRN Moderate Pain (4 - 6)  morphine  - Injectable 2 milliGRAM(s) IV Push every 6 hours PRN Severe Pain (7 - 10)      Allergies    No Known Allergies    Intolerances        REVIEW OF SYSTEMS:  CONSTITUTIONAL: No fever, weight loss, or fatigue  RESPIRATORY: No cough, wheezing, chills or hemoptysis; No shortness of breath  CARDIOVASCULAR: No chest pain, palpitations, dizziness, or leg swelling  GASTROINTESTINAL: No abdominal or epigastric pain. No nausea, vomiting, or hematemesis; No diarrhea or constipation. No melena or hematochezia.  NEUROLOGICAL: No headaches, memory loss, loss of strength, numbness, or tremors  SKIN: No itching, burning, rashes, or lesions     Vital Signs Last 24 Hrs  T(C): 36.6 (09 Nov 2017 04:50), Max: 36.7 (08 Nov 2017 14:34)  T(F): 97.8 (09 Nov 2017 04:50), Max: 98.1 (08 Nov 2017 14:34)  HR: 85 (09 Nov 2017 04:50) (55 - 104)  BP: 159/74 (09 Nov 2017 04:50) (135/100 - 159/78)  BP(mean): --  RR: 18 (09 Nov 2017 04:50) (18 - 18)  SpO2: 97% (09 Nov 2017 04:50) (95% - 97%)    PHYSICAL EXAM:  GENERAL: NAD, well-groomed, well-developed  HEAD:  Atraumatic, Normocephalic  EYES: EOMI, PERRLA, conjunctiva and sclera clear  NECK: Supple, No JVD, Normal thyroid  CHEST/LUNG: Clear to percussion bilaterally; No rales, rhonchi, wheezing, or rubs  HEART: Regular rate and rhythm; No murmurs, rubs, or gallops  ABDOMEN: Soft, Nontender, Nondistended; Bowel sounds present  NERVOUS SYSTEM:  Alert & Oriented X3, Good concentration; Motor Strength 5/5 B/L   EXTREMITIES:  2+ Peripheral Pulses, No clubbing, cyanosis, or edema  SKIN;    LABS:                        11.8   12.4  )-----------( 135      ( 08 Nov 2017 07:33 )             35.6     11-08    140  |  108  |  19<H>  ----------------------------<  94  4.0   |  27  |  0.32<L>    Ca    8.5      08 Nov 2017 07:33  Phos  1.8     11-08  Mg     2.0     11-08    TPro  5.7<L>  /  Alb  2.7<L>  /  TBili  1.3<H>  /  DBili  0.3<H>  /  AST  24  /  ALT  18  /  AlkPhos  66  11-08        CAPILLARY BLOOD GLUCOSE      POCT Blood Glucose.: 101 mg/dL (08 Nov 2017 22:28)  POCT Blood Glucose.: 98 mg/dL (08 Nov 2017 16:50)      RADIOLOGY & ADDITIONAL TESTS:    Imaging Personally Reviewed:  [ x] YES  [ ] NO    Consultant(s) Notes Reviewed:  [x ] YES  [ ] NO
PGY1 Note discussed with supervising resident and primary attending.    Patient is a 88y old  Female who presents with a chief complaint of Mechanical fall, right hip fracture (07 Nov 2017 09:03)      INTERVAL HPI/OVERNIGHT EVENTS: No overnight events.    MEDICATIONS  (STANDING):  dextrose 5%. 1000 milliLiter(s) (50 mL/Hr) IV Continuous <Continuous>  dextrose 50% Injectable 12.5 Gram(s) IV Push once  dextrose 50% Injectable 25 Gram(s) IV Push once  dextrose 50% Injectable 25 Gram(s) IV Push once  enoxaparin Injectable 40 milliGRAM(s) SubCutaneous daily  insulin lispro (HumaLOG) corrective regimen sliding scale   SubCutaneous three times a day before meals  propylthiouracil 50 milliGRAM(s) Oral daily  sodium chloride 0.9%. 1000 milliLiter(s) (60 mL/Hr) IV Continuous <Continuous>    MEDICATIONS  (PRN):  dextrose Gel 1 Dose(s) Oral once PRN Blood Glucose LESS THAN 70 milliGRAM(s)/deciLiter  glucagon  Injectable 1 milliGRAM(s) IntraMuscular once PRN Glucose <70 milliGRAM(s)/deciLiter  ketorolac   Injectable 15 milliGRAM(s) IV Push every 8 hours PRN Moderate Pain (4 - 6)  morphine  - Injectable 2 milliGRAM(s) IV Push every 6 hours PRN Severe Pain (7 - 10)      Allergies    No Known Allergies    Intolerances        REVIEW OF SYSTEMS:  CONSTITUTIONAL: No fever, weight loss, or fatigue  RESPIRATORY: No cough, wheezing, chills or hemoptysis; No shortness of breath  CARDIOVASCULAR: No chest pain, palpitations, dizziness, or leg swelling  GASTROINTESTINAL: No abdominal or epigastric pain. No nausea, vomiting, or hematemesis; No diarrhea or constipation. No melena or hematochezia.  NEUROLOGICAL: No headaches, memory loss, loss of strength, numbness, or tremors  SKIN: No itching, burning, rashes, or lesions     Vital Signs Last 24 Hrs  T(C): 36.8 (08 Nov 2017 04:53), Max: 36.8 (08 Nov 2017 04:53)  T(F): 98.3 (08 Nov 2017 04:53), Max: 98.3 (08 Nov 2017 04:53)  HR: 88 (08 Nov 2017 04:53) (88 - 96)  BP: 170/58 (08 Nov 2017 04:53) (151/54 - 170/58)  BP(mean): --  RR: 18 (08 Nov 2017 04:53) (17 - 18)  SpO2: 95% (08 Nov 2017 04:53) (92% - 98%)    PHYSICAL EXAM:  GENERAL: NAD, well-groomed, well-developed  HEAD:  Atraumatic, Normocephalic  EYES: EOMI, PERRLA, conjunctiva and sclera clear  NECK: Supple, No JVD, Normal thyroid  CHEST/LUNG: Clear to percussion bilaterally; No rales, rhonchi, wheezing, or rubs  HEART: Regular rate and rhythm; No murmurs, rubs, or gallops  ABDOMEN: Soft, Nontender, Nondistended; Bowel sounds present  NERVOUS SYSTEM:  Alert & Oriented X3, Good concentration; Motor Strength 5/5 B/L   EXTREMITIES:  2+ Peripheral Pulses, No clubbing, cyanosis, or edema  SKIN;    LABS:                        11.8   12.4  )-----------( 135      ( 08 Nov 2017 07:33 )             35.6     11-08    140  |  108  |  19<H>  ----------------------------<  94  4.0   |  27  |  0.32<L>    Ca    8.5      08 Nov 2017 07:33  Phos  1.8     11-08  Mg     2.0     11-08    TPro  5.7<L>  /  Alb  2.7<L>  /  TBili  1.3<H>  /  DBili  0.3<H>  /  AST  24  /  ALT  18  /  AlkPhos  66  11-08    PT/INR - ( 07 Nov 2017 00:27 )   PT: 11.4 sec;   INR: 1.04 ratio         PTT - ( 07 Nov 2017 00:27 )  PTT:30.9 sec    CAPILLARY BLOOD GLUCOSE          RADIOLOGY & ADDITIONAL TESTS:    Imaging Personally Reviewed:  [x ] YES  [ ] NO    Consultant(s) Notes Reviewed:  [ x] YES  [ ] NO
· Subjective and Objective: 	  INTERVAL HPI/OVERNIGHT EVENTS:no acute events,awake,episodic pain,some confusion    VITAL SIGNS:  T(F): 97.5 (11-11-17 @ 06:07)  HR: 88 (11-11-17 @ 06:07)  BP: 131/70 (11-11-17 @ 06:07)  RR: 16 (11-11-17 @ 06:07)  SpO2: 99% (11-11-17 @ 06:07)  Wt(kg): --    PHYSICAL EXAM:awake    Constitutional:  Eyes:  ENMT:perrla,eom intact  Neck:  Respiratory:clear  Cardiovascular:s1 s2,m-none  Gastrointestinal:soft,non-tender  Extremities:r l/extrem with mild tenderness,decrease rom  Vascular:  Neurological:  Musculoskeletal:    MEDICATIONS  (STANDING):  ascorbic acid 500 milliGRAM(s) Oral two times a day  dextrose 5%. 1000 milliLiter(s) (50 mL/Hr) IV Continuous <Continuous>  dextrose 50% Injectable 12.5 Gram(s) IV Push once  dextrose 50% Injectable 25 Gram(s) IV Push once  dextrose 50% Injectable 25 Gram(s) IV Push once  docusate sodium 100 milliGRAM(s) Oral three times a day  enoxaparin Injectable 40 milliGRAM(s) SubCutaneous daily  ferrous    sulfate 325 milliGRAM(s) Oral three times a day with meals  folic acid 1 milliGRAM(s) Oral daily  multivitamin 1 Tablet(s) Oral daily  propylthiouracil 50 milliGRAM(s) Oral daily  sodium chloride 0.9%. 1000 milliLiter(s) (60 mL/Hr) IV Continuous <Continuous>    MEDICATIONS  (PRN):  aluminum hydroxide/magnesium hydroxide/simethicone Suspension 30 milliLiter(s) Oral four times a day PRN Indigestion  dextrose Gel 1 Dose(s) Oral once PRN Blood Glucose LESS THAN 70 milliGRAM(s)/deciLiter  glucagon  Injectable 1 milliGRAM(s) IntraMuscular once PRN Glucose <70 milliGRAM(s)/deciLiter  ketorolac   Injectable 15 milliGRAM(s) IV Push every 6 hours PRN Moderate Pain (4 - 6)  morphine  - Injectable 2 milliGRAM(s) IV Push every 6 hours PRN Severe Pain (7 - 10)      Allergies    No Known Allergies    Intolerances        LABS:                        12.2   14.8  )-----------( 127      ( 10 Nov 2017 09:14 )             37.2     11-10    141  |  109<H>  |  14  ----------------------------<  90  4.0   |  26  |  0.33<L>    Ca    8.0<L>      10 Nov 2017 09:38          Assessment and Plan:   · Assessment		  Patient is a 89 y/o F, from home, walks with a walker with PMHx of ? Hyperthyroidism ( pt on PTU at home) presents to ED c/o R hip pain, sharp, severe, since fall at 9 PM yesterday night. Patient has stable vitals in the ED, initially saturating well, however later saturating in low 90's on the floor without any resp distress, CXR: No focal lung consolidation.    No pneumothorax or pleural effusion. Placed on 2L NC with improvement in saturation, Labs WNL, except elevated BUN (? Dehydration), Coags WNL, type cross obtained. s/p 1 L NS and pain management in the ED. X ray Right Hip: Femoral Neck Fracture, admitted for management of femoral neck Fracture.       Problem/Plan - 1:  ·  Problem: Closed fracture of right hip, initial encounter.  Plan: Closed right hip fracture  -x ray hip: Rt femoral neck fracutre  -s/p pod #3  -pain control  -ortho- f/up  -pt f/up  -d/c plan recomendec to [facility]     Problem/Plan - 2:  ·  Problem: Hyperthyroidism.  Plan: Patient takes PTU at home, However herself id not sure about h/O Hyperthyroidism  -TSH 0.01  -restart home dose.      Problem/Plan - 3:  ·  Problem: Prophylactic measure.  Plan: IMPROVE VTE score:2   -need DVT ppx, on lovenox
Chief Complaint: right hip pain    HPI:   88y Female s/p right hip im nailing POD#1.  Pt confused but complains of right hip pain on exertion.  No nausea or vomiting.  No chest pain or sob.        PAIN SCORE:   5/10      SCALE USED: (1-10 VNRS)    Allergies    No Known Allergies    Intolerances      MEDICATIONS  (STANDING):  acetaminophen  IVPB. 1000 milliGRAM(s) IV Intermittent every 6 hours  ascorbic acid 500 milliGRAM(s) Oral two times a day  dextrose 5%. 1000 milliLiter(s) (50 mL/Hr) IV Continuous <Continuous>  dextrose 50% Injectable 12.5 Gram(s) IV Push once  dextrose 50% Injectable 25 Gram(s) IV Push once  dextrose 50% Injectable 25 Gram(s) IV Push once  docusate sodium 100 milliGRAM(s) Oral three times a day  enoxaparin Injectable 40 milliGRAM(s) SubCutaneous daily  ferrous    sulfate 325 milliGRAM(s) Oral three times a day with meals  folic acid 1 milliGRAM(s) Oral daily  multivitamin 1 Tablet(s) Oral daily  propylthiouracil 50 milliGRAM(s) Oral daily  sodium chloride 0.9%. 1000 milliLiter(s) (60 mL/Hr) IV Continuous <Continuous>    MEDICATIONS  (PRN):  aluminum hydroxide/magnesium hydroxide/simethicone Suspension 30 milliLiter(s) Oral four times a day PRN Indigestion  dextrose Gel 1 Dose(s) Oral once PRN Blood Glucose LESS THAN 70 milliGRAM(s)/deciLiter  glucagon  Injectable 1 milliGRAM(s) IntraMuscular once PRN Glucose <70 milliGRAM(s)/deciLiter  ketorolac   Injectable 15 milliGRAM(s) IV Push every 6 hours PRN Moderate Pain (4 - 6)  morphine  - Injectable 2 milliGRAM(s) IV Push every 6 hours PRN Severe Pain (7 - 10)      PHYSICAL EXAM:    Vital Signs Last 24 Hrs  T(C): 36.8 (10 Nov 2017 05:11), Max: 36.9 (10 Nov 2017 00:48)  T(F): 98.2 (10 Nov 2017 05:11), Max: 98.4 (10 Nov 2017 00:48)  HR: 78 (10 Nov 2017 05:11) (65 - 92)  BP: 161/69 (10 Nov 2017 05:11) (100/64 - 161/69)  BP(mean): 86 (09 Nov 2017 14:54) (69 - 86)  RR: 17 (10 Nov 2017 05:11) (16 - 24)  SpO2: 95% (10 Nov 2017 05:11) (95% - 100%)             LABS:                          12.2   14.8  )-----------( 127      ( 10 Nov 2017 09:14 )             37.2     11-10    141  |  109<H>  |  14  ----------------------------<  90  4.0   |  26  |  0.33<L>    Ca    8.0<L>      10 Nov 2017 09:38            Drug Screen:        RADIOLOGY:

## 2017-11-13 NOTE — DISCHARGE NOTE ADULT - CARE PLAN
Principal Discharge DX:	Closed fracture of right hip, initial encounter  Goal:	to improve functionality  Instructions for follow-up, activity and diet:	s/p IM nailing post op Day #4   continue with sub cut lovenox till 11/23 for DVT ppx   Staples removal on 11/23  Secondary Diagnosis:	Hyperthyroidism  Instructions for follow-up, activity and diet:	continue with home meds

## 2017-11-13 NOTE — DISCHARGE NOTE ADULT - MEDICATION SUMMARY - MEDICATIONS TO STOP TAKING
I will STOP taking the medications listed below when I get home from the hospital:    Ambien 5 mg oral tablet  -- 1 tab(s) by mouth once a day (at bedtime), As Needed

## 2017-11-13 NOTE — DISCHARGE NOTE ADULT - PLAN OF CARE
to improve functionality s/p IM nailing post op Day #4   continue with sub cut lovenox till 11/23 for DVT ppx   Staples removal on 11/23 continue with home meds

## 2017-11-13 NOTE — DISCHARGE NOTE ADULT - HOSPITAL COURSE
Patient is a 87 y/o F, from home, walks with a walker with PMHx of ? Hyperthyroidism ( pt on PTU at home) presents to ED c/o R hip pain, sharp, severe, since fall , found to have closed right hip fracture , S/p R Hip IM Nailing POD#4 .   Patient to continue lovenox sub cut for DVT ppx till 11/23 and staples removal on 11/23 as per ortho .   Patient to continue her home meds for hyperthyroidism.   Patient medically stable to be discharged to rehab as per medical attending.

## 2017-11-13 NOTE — DISCHARGE NOTE ADULT - MEDICATION SUMMARY - MEDICATIONS TO TAKE
I will START or STAY ON the medications listed below when I get home from the hospital:    enoxaparin  -- 40 milligram(s) subcutaneous once a day till 11/23/17  -- Indication: For Post op     propylthiouracil 50 mg oral tablet  -- 1 tab(s) by mouth once a day  -- Indication: For Hyperthyroidism    ferrous sulfate 325 mg (65 mg elemental iron) oral tablet  -- 1 tab(s) by mouth 3 times a day  -- Indication: For Anemia    docusate sodium 100 mg oral capsule  -- 1 cap(s) by mouth 3 times a day  -- Indication: For Constipation     Multiple Vitamins oral tablet  -- 1 tab(s) by mouth once a day  -- Indication: For supplement     ascorbic acid 500 mg oral tablet  -- 1 tab(s) by mouth 2 times a day  -- Indication: For supplement     folic acid 1 mg oral tablet  -- 1 tab(s) by mouth once a day  -- Indication: For supplement I will START or STAY ON the medications listed below when I get home from the hospital:    Tylenol 325 mg oral tablet  -- 2 tab(s) by mouth every 6 hours, As Needed   -- This product contains acetaminophen.  Do not use  with any other product containing acetaminophen to prevent possible liver damage.    -- Indication: For Pain     enoxaparin  -- 40 milligram(s) subcutaneous once a day till 11/23/17  -- Indication: For Post op     propylthiouracil 50 mg oral tablet  -- 1 tab(s) by mouth once a day  -- Indication: For Hyperthyroidism    ferrous sulfate 325 mg (65 mg elemental iron) oral tablet  -- 1 tab(s) by mouth 3 times a day  -- Indication: For Anemia    docusate sodium 100 mg oral capsule  -- 1 cap(s) by mouth 3 times a day  -- Indication: For Constipation     Multiple Vitamins oral tablet  -- 1 tab(s) by mouth once a day  -- Indication: For supplement     ascorbic acid 500 mg oral tablet  -- 1 tab(s) by mouth 2 times a day  -- Indication: For supplement     folic acid 1 mg oral tablet  -- 1 tab(s) by mouth once a day  -- Indication: For supplement

## 2017-11-28 PROBLEM — L40.9 PSORIASIS, UNSPECIFIED: Chronic | Status: ACTIVE | Noted: 2017-01-01

## 2017-11-28 PROBLEM — F03.90 UNSPECIFIED DEMENTIA WITHOUT BEHAVIORAL DISTURBANCE: Chronic | Status: ACTIVE | Noted: 2017-01-01

## 2017-11-28 NOTE — H&P ADULT - PROBLEM SELECTOR PLAN 1
2/2 RLL pneumonia 2/2 RLL pneumonia   qsofa - 3/3   lactate - 3.9 ,tachycardic, no leucocytosis  c/w vanco + zosyn  ABG on s/p intubation on FiO2 of 100%, TV: 400, PEEP of 5 is   pH: 7.22, PaCO2: 66, PaO2: 95, Bicarb: 26, O2 Sat of 100%  c/w sedation , titrate for RASS score   c/w levophed , titrate for MAP of 65  f/u BCx, UCx  f/u repeat labs   repeat lactate 2/2 RLL pneumonia   qsofa - 3/3   lactate - 3.9 ,tachycardic, no leucocytosis, febrile 101.9  c/w vanco + zosyn  c/w LR @ 100 cc  ABG on s/p intubation on FiO2 of 100%, TV: 400, PEEP of 5 is   pH: 7.22, PaCO2: 66, PaO2: 95, Bicarb: 26, O2 Sat of 100%  c/w sedation , titrate for RASS score   c/w levophed , titrate for MAP of 65  f/u BCx, UCx  f/u repeat labs   repeat lactate  hypoxic and hypercapnia respiratory failure  addendum - changing levophed to vasopressin fo persistent tachycardia  f/u RVP, legionella

## 2017-11-28 NOTE — ED PROCEDURE NOTE - CPROC ED INFUS LINE DETAIL1
The catheter was placed using sterile technique./The guidewire was recovered./The location was identified, and the area was draped and prepped./Ultrasound guidance was used during placement./All lumen(s) aspirated and flushed without difficulty.

## 2017-11-28 NOTE — H&P ADULT - PROBLEM SELECTOR PLAN 2
hypoxic and hypercapnia respiratory failure 2/2 RLL pneumonia   suspecting aspiration   BG on s/p intubation on FiO2 of 100%, TV: 400, PEEP of 5 is   pH: 7.22, PaCO2: 66, PaO2: 95, Bicarb: 26, O2 Sat of 100%  c/w sedation , titrate for RASS score   c/w levophed , titrate for MAP of 65 hypoxic and hypercapnia respiratory failure 2/2 RLL pneumonia   suspecting aspiration   BG on s/p intubation on FiO2 of 100%, TV: 400, PEEP of 5 is   pH: 7.22, PaCO2: 66, PaO2: 95, Bicarb: 26, O2 Sat of 100%  c/w sedation , titrate for RASS score of -2  c/w levophed , titrate for MAP of 65  full dose lovenox 1 dose given suspected PE ( recent hip fracture )

## 2017-11-28 NOTE — ED PROCEDURE NOTE - CPROC ED INFORMED CONSENT1
This was an emergent procedure and consent was implied.
This was an emergent procedure and consent was implied.

## 2017-11-28 NOTE — H&P ADULT - HISTORY OF PRESENT ILLNESS
Patient is a 89/F from [facility], with PMHx of right femur neck fracture, iron deficiency anemia, Hyperthyroidism ( patient on PTU at home) constipation, difficulty walking, was BIB EMS from NH for c/o difficulty breathing. As per ED attending, patient was tachycardic, tachypneic up to 50's, hypotensive to SBP of 50's and lethargic and needed to be intubated in the ER. Patient is Full Code as per NH papers.  In ED pt has CXR showing   Social Hx: cannot obtain at this time.   Surgical Hx and Fhx cannot be obtained at this time  Next of Kin as per NH papers: Mamie Hagan, Daughter, unable to get contact number of pt's daughter Patient is a 89/F from [facility], with PMHx of right femur neck fracture, iron deficiency anemia, Hyperthyroidism ( patient on PTU at home) constipation, difficulty walking, was BIB EMS from NH for c/o difficulty breathing. As per ED attending, patient was tachycardic, tachypneic up to 50's, hypotensive to SBP of 50's and lethargic and needed to be intubated in the ER. Patient is Full Code as per NH papers.  In ED pt has CXR showing   Social Hx: cannot obtain at this time.   Surgical Hx and Fhx cannot be obtained at this time  Next of Kin as per NH papers: Mamie Hagan, Daughter, unable to get contact number of pt's daughter, full code

## 2017-11-28 NOTE — H&P ADULT - ASSESSMENT
Patient was intubated in the ED, tachycardic to 110-130's in the ED, BP improved to 156/72 after starting levophed,  No leucocytosis, but has a left shift, BMP shows hypernatremia to 151, elevated BUN to 45, lactate is 3.6. MCV elevated to 104.1.   ABG on s/p intubation on FiO2 of 100%, TV: 400, PEEP of 5 is pH: 7.22, PaCO2: 66, PaO2: 95, Bicarb: 26, O2 Sat of 100%  s/p 2L NS bolus and vanc and zosyn in the ED  CXR: Patchy right perihilar opacities, which may be due to pneumonia. Mild diffuse interstitial prominence. Small bilateral pleural effusions.  EKG - LVH , LAD , incomplete LBBB, QRS     Admitted for hypoxic and hypercapnia respiratory failure and septic shock 2/2 to [facility]

## 2017-11-28 NOTE — H&P ADULT - PROBLEM SELECTOR PLAN 4
2/2 dehydration and poor PO intake'  free water deficit - 2.1 L + 1 L  = 3.1 2/2 dehydration and poor PO intake   free water deficit - 2.1 L + 1 L  = 3.1  2000 bolus was given   giving LR @ 100 cc  monitor Na levels in next BMP

## 2017-11-28 NOTE — H&P ADULT - ATTENDING COMMENTS
88yo F w/ recent femur fracture, sent to the ED from nursing home w/ fever, chills, and respiratory distress.  Pt required ET intubation and TLC insertion in the ED.  CXR w/ RLL infiltrate.    Imp:  1.  Acute hypoxic resp failure  2.  septic shock secondary to pneumonia  3.  atrial fibrillation w/ RVR  4.  elevated troponin    Plan:  1.  mechanical vent support. Target Vt 7ml/kg IBW  2.  propofol for vent synchrony.  repeat ABG.  3.  30cc/kg IVF bolus followed by LR   4.  zosyn/vanco/levofloxacin empirically  5.  panculture.  Check RVP, urine legionella Ag  6.  d/c levophed w/ rapid atrial fibrillation.  Now on vasopressin to maintain MAP > 65  7.  amiodarone 150mg IV over 10 min.    8.  lovenox 1mg/kg Bid for atrial fib and elevated troponin.  ASA per NG tube.  Serial Cardiac enzymes  9.  SUP w/ PPI

## 2017-11-28 NOTE — ED PROVIDER NOTE - MEDICAL DECISION MAKING DETAILS
89F from NH p/w hypoxic respiratory failure 2/2 septic shock requiring intubation and vasopressor support. cbc, pan-culture,cxr,abx,IVF,SMA7,LFTs,vbg,lactate. admit to ICU.

## 2017-11-28 NOTE — ED PROVIDER NOTE - OBJECTIVE STATEMENT
Patient is a 89/F from [facility] [place] [facility] Received in ER as notification. Upon arrival patient was lethargic, tachycardic, tachypneic, and hypotensive requiring immediate evaluation and resuscitation. History limited from patient and no other significant information obtained at that time beside full code. Patient subsequently required intubation and central line insertion immediately for airway protection and resuscitation.

## 2017-11-28 NOTE — H&P ADULT - PROBLEM SELECTOR PLAN 3
EKG - LVH , LAD , incomplete LBBB, QRS   f/u Cardiac enzymes   monitor on tele EKG - LVH , LAD , incomplete LBBB, QRS   f/u Cardiac enzymes , trend cardiac enzymes   monitor on tele  c/w aspirin , statin through NG tube   no BB for hypotension

## 2017-11-28 NOTE — ED ADULT NURSE NOTE - OBJECTIVE STATEMENT
BIB by [facility]  with acute  respiratory distress. Patient intubated by dr Serafin PANDYA 7, lip line 23 . Hypotensive, sepsis blood work initiated , Levofed started at 0.3 mcg

## 2017-11-29 NOTE — PROGRESS NOTE ADULT - PROBLEM SELECTOR PLAN 2
hypoxic and hypercapnia respiratory failure 2/2 RLL pneumonia   suspecting aspiration   BG on s/p intubation on FiO2 of 100%, TV: 400, PEEP of 5 is   pH: 7.22, PaCO2: 66, PaO2: 95, Bicarb: 26, O2 Sat of 100%  c/w sedation , titrate for RASS score of -2  c/w levophed , titrate for MAP of 65  full dose lovenox 1 dose given suspected PE ( recent hip fracture )

## 2017-11-29 NOTE — CONSULT NOTE ADULT - SUBJECTIVE AND OBJECTIVE BOX
Patient is a 89y old  Female who presents with a chief complaint of respiratory distress (2017 17:02)      HPI:  Patient is a 89/F from [facility], with PMHx of right femur neck fracture, iron deficiency anemia, Hyperthyroidism ( patient on PTU at home) constipation, difficulty walking, was BIB EMS from NH for c/o difficulty breathing. As per ED attending, patient was tachycardic, tachypneic up to 50's, hypotensive to SBP of 50's and lethargic and needed to be intubated in the ER. Patient is Full Code as per NH papers.  Social Hx: cannot obtain at this time.   Surgical Hx and Fhx cannot be obtained at this time  Next of Kin as per NH papers: Mamie Hagan, Daughter, unable to get contact number of pt's daughter, full code (2017 17:02)      PAST MEDICAL & SURGICAL HISTORY:  Psoriasis  Dementia  No significant past surgical history      HPI:                PREVIOUS DIAGNOSTIC TESTING:      ECHO  FINDINGS:   1. Mild mitral regurgitation.  2. Normal left ventricular internal dimensions and wall  thicknesses.  3. Normal left ventricular function. Mild diastolic  dysfunction (Stage I).  4. Normal right ventricular size and function.  5. RV systolic pressure is 56 mm Hg. Moderate pulmonary  hypertension.  Performed on  2017 - 10:05:46     MEDICATIONS  (STANDING):  ascorbic acid 500 milliGRAM(s) Oral daily  aspirin  chewable 81 milliGRAM(s) Oral daily  atorvastatin 40 milliGRAM(s) Oral at bedtime  calcium gluconate IVPB 1 Gram(s) IV Intermittent once  dexmedetomidine Infusion 0.2 MICROgram(s)/kG/Hr (3 mL/Hr) IV Continuous <Continuous>  fentaNYL   Infusion 0.5 MICROgram(s)/kG/Hr (2.335 mL/Hr) IV Continuous <Continuous>  ferrous    sulfate Liquid 300 milliGRAM(s) Enteral Tube daily  folic acid 1 milliGRAM(s) Oral daily  heparin  Injectable 5000 Unit(s) SubCutaneous every 8 hours  lactated ringers. 1000 milliLiter(s) (100 mL/Hr) IV Continuous <Continuous>  levoFLOXacin IVPB 750 milliGRAM(s) IV Intermittent every 24 hours  levoFLOXacin IVPB      magnesium sulfate  IVPB 2 Gram(s) IV Intermittent every 2 hours  multivitamin 1 Tablet(s) Oral daily  phenylephrine    Infusion 0.5 MICROgram(s)/kG/Min (5.625 mL/Hr) IV Continuous <Continuous>  piperacillin/tazobactam IVPB. 3.375 Gram(s) IV Intermittent every 8 hours  potassium phosphate IVPB 30 milliMole(s) IV Intermittent every 6 hours  propylthiouracil 50 milliGRAM(s) Oral daily  vancomycin  IVPB 1000 milliGRAM(s) IV Intermittent every 12 hours    MEDICATIONS  (PRN):  acetaminophen  Suppository 650 milliGRAM(s) Rectal every 6 hours PRN For Temp greater than 38 C (100.4 F)    ROS ;Patient intubated . Unable to obtain    PHYSICAL EXAM:  GENERAL:   HEAD: atraumatic, normocephalic   SKIN: warm, dry   CHEST/LUNG: No Chest deformity , no chest tenderness. bilateral breath sounds, no  adventitious sounds  HEART: RRR, no m/r/g   ABDOMEN: soft, nontender, nondistended; bowel sounds.  EXTREMITIES: +1 non-pitting edema, no cyanosis, no clubbing.    Vital Signs Last 24 Hrs  T(C): 37.3 (2017 05:25), Max: 38.9 (2017 18:44)  T(F): 99.1 (2017 05:25), Max: 102.1 (2017 18:44)  HR: 72 (2017 08:22) (59 - 160)  BP: 96/37 (2017 07:00) (57/38 - 168/51)  BP(mean): 51 (2017 07:00) (51 - 85)  RR: 24 (2017 07:00) (14 - 31)  SpO2: 100% (2017 08:22) (94% - 100%)      --------------------------------------------------------  IN:    IV PiggyBack: 50 mL    lactated ringers.: 1100 mL    phenylephrine   Infusion: 420 mL    propofol Infusion: 116.2 mL    Sodium Chloride 0.9% IV Bolus: 1000 mL    Solution: 250 mL  Total IN: 2936.2 mL    OUT:    Indwelling Catheter - Urethral: 485 mL  Total OUT: 485 mL    Total NET: 2451.2 mL          LABS:                        8.5    9.6   )-----------( 127      ( 2017 06:45 )             25.4         145  |  115<H>  |  25<H>  ----------------------------<  80  3.5   |  15<L>  |  <0.20<L>    Ca    6.2<LL>      2017 06:45  Phos  0.8       Mg     1.2         TPro  6.5  /  Alb  2.5<L>  /  TBili  1.3<H>  /  DBili  x   /  AST  22  /  ALT  19  /  AlkPhos  119      CARDIAC MARKERS ( 2017 06:45 )  1.030 ng/mL / x     / 187 U/L / x     / 3.6 ng/mL  CARDIAC MARKERS ( 2017 22:30 )  1.560 ng/mL / x     / 91 U/L / x     / 5.2 ng/mL  CARDIAC MARKERS ( 2017 16:57 )  1.540 ng/mL / x     / 39 U/L / x     / 4.7 ng/mL        Urinalysis Basic - ( 2017 16:48 )    Color: Yellow / Appearance: Slightly Turbid / S.020 / pH: x  Gluc: x / Ketone: Small  / Bili: Negative / Urobili: 4   Blood: x / Protein: 100 / Nitrite: Negative   Leuk Esterase: Negative / RBC: 5-10 /HPF / WBC 3-5 /HPF   Sq Epi: x / Non Sq Epi: Moderate /HPF / Bacteria: Many /HPF      I&O's Summary    2017 07:01  -  2017 07:00  --------------------------------------------------------  IN: 2936.2 mL / OUT: 485 mL / NET: 2451.2 mL      BNP  RADIOLOGY & ADDITIONAL STUDIES:

## 2017-11-29 NOTE — PROGRESS NOTE ADULT - SUBJECTIVE AND OBJECTIVE BOX
INTERVAL /OVERNIGHT EVENTS: Pt tried to pull out ET tube overnight    PRESSORS: [ x] YES [ ] NO  WHICH: phenylephrine    ANTIBIOTICS: vancomycin, zosyn, levaquin                 DATE STARTED:                  Antimicrobial:  levoFLOXacin IVPB 750 milliGRAM(s) IV Intermittent every 24 hours  levoFLOXacin IVPB      piperacillin/tazobactam IVPB. 3.375 Gram(s) IV Intermittent every 8 hours  vancomycin  IVPB 1000 milliGRAM(s) IV Intermittent every 12 hours    Cardiovascular:  phenylephrine    Infusion 0.5 MICROgram(s)/kG/Min IV Continuous <Continuous>    Pulmonary: N/A    Hematalogic:  aspirin  chewable 81 milliGRAM(s) Oral daily    Other:  acetaminophen  Suppository 650 milliGRAM(s) Rectal every 6 hours PRN  ascorbic acid 500 milliGRAM(s) Oral daily  atorvastatin 40 milliGRAM(s) Oral at bedtime  calcium gluconate IVPB 1 Gram(s) IV Intermittent once  dexmedetomidine Infusion 0.2 MICROgram(s)/kG/Hr IV Continuous <Continuous>  ferrous    sulfate Liquid 300 milliGRAM(s) Enteral Tube daily  folic acid 1 milliGRAM(s) Oral daily  lactated ringers. 1000 milliLiter(s) IV Continuous <Continuous>  magnesium sulfate  IVPB 2 Gram(s) IV Intermittent every 2 hours  multivitamin 1 Tablet(s) Oral daily  propofol Infusion 5 MICROgram(s)/kG/Min IV Continuous <Continuous>  propylthiouracil 50 milliGRAM(s) Oral daily    acetaminophen  Suppository 650 milliGRAM(s) Rectal every 6 hours PRN  ascorbic acid 500 milliGRAM(s) Oral daily  aspirin  chewable 81 milliGRAM(s) Oral daily  atorvastatin 40 milliGRAM(s) Oral at bedtime  calcium gluconate IVPB 1 Gram(s) IV Intermittent once  dexmedetomidine Infusion 0.2 MICROgram(s)/kG/Hr IV Continuous <Continuous>  ferrous    sulfate Liquid 300 milliGRAM(s) Enteral Tube daily  folic acid 1 milliGRAM(s) Oral daily  lactated ringers. 1000 milliLiter(s) IV Continuous <Continuous>  levoFLOXacin IVPB 750 milliGRAM(s) IV Intermittent every 24 hours  levoFLOXacin IVPB      magnesium sulfate  IVPB 2 Gram(s) IV Intermittent every 2 hours  multivitamin 1 Tablet(s) Oral daily  phenylephrine    Infusion 0.5 MICROgram(s)/kG/Min IV Continuous <Continuous>  piperacillin/tazobactam IVPB. 3.375 Gram(s) IV Intermittent every 8 hours  propofol Infusion 5 MICROgram(s)/kG/Min IV Continuous <Continuous>  propylthiouracil 50 milliGRAM(s) Oral daily  vancomycin  IVPB 1000 milliGRAM(s) IV Intermittent every 12 hours    Drug Dosing Weight  Height (cm): 144.78 (2017 18:44)  Weight (kg): 46.7 (2017 18:44)  BMI (kg/m2): 22.3 (2017 18:44)  BSA (m2): 1.36 (2017 18:44)    CENTRAL LINE: [ x] YES [ ] NO  LOCATION: [facility]  DATE INSERTED:   REMOVE: [ ] YES [ x] NO  EXPLAIN: still in use, within 7 days of insertion    VOSS: [ ] YES [x ] NO        A-LINE:  [ ] YES [x ] NO       ICU Vital Signs Last 24 Hrs  T(C): 37.3 (2017 05:25), Max: 38.9 (2017 18:44)  T(F): 99.1 (2017 05:25), Max: 102.1 (2017 18:44)  HR: 87 (2017 07:00) (59 - 160)  BP: 96/37 (2017 07:00) (57/38 - 168/51)  BP(mean): 51 (2017 07:00) (51 - 85)  RR: 24 (2017 07:00) (14 - 31)  SpO2: 100% (2017 07:00) (94% - 100%)      ABG - ( 2017 05:30 )  pH: 7.38  /  pCO2: 43    /  pO2: 99    / HCO3: 24    / Base Excess: -0.3  /  SaO2: 98                     @ 07:01  -   @ 07:00  --------------------------------------------------------  IN: 2936.2 mL / OUT: 485 mL / NET: 2451.2 mL        Mode: AC/ CMV (Assist Control/ Continuous Mandatory Ventilation)  RR (machine): 16  TV (machine): 400  FiO2: 50  PEEP: 5  ITime: 1  MAP: 11  PIP: 23      PHYSICAL EXAM:    GENERAL:   HEAD: atraumatic, normocephalic   EYES: PERRLA, white sclera.   ENMT: nasal mucosa- Oral cavity-   NECK: supple, JVD/ no JVD, thyroid-   LYMPH: no palpable lymph nodes     SKIN: warm, dry   CHEST/LUNG: No Chest deformity , no chest tenderness. bilateral breath sounds, no  adventitious sounds  HEART: RRR, no m/r/g   ABDOMEN: soft, nontender, nondistended; bowel sounds.  EXTREMITIES: +1 non-pitting edema, no cyanosis, no clubbing.  NEURO: AA0X , mood/ affect-, no focal neuro deficits        LABS:  CBC Full  -  ( 2017 22:30 )  WBC Count : 13.2 K/uL  Hemoglobin : 11.6 g/dL  Hematocrit : 38.6 %  Platelet Count - Automated : 246 K/uL  Mean Cell Volume : 104.3 fl  Mean Cell Hemoglobin : 31.4 pg  Mean Cell Hemoglobin Concentration : 30.1 gm/dL  Auto Neutrophil # : 11.3 K/uL  Auto Lymphocyte # : 0.6 K/uL  Auto Monocyte # : 1.4 K/uL  Auto Eosinophil # : 0.0 K/uL  Auto Basophil # : 0.2 K/uL  Auto Neutrophil % : 83.7 %  Auto Lymphocyte % : 4.6 %  Auto Monocyte % : 10.5 %  Auto Eosinophil % : 0.0 %  Auto Basophil % : 1.2 %        145  |  115<H>  |  25<H>  ----------------------------<  80  3.5   |  15<L>  |  <0.20<L>    Ca    6.2<LL>      2017 06:45  Phos  0.8       Mg     1.2         TPro  6.5  /  Alb  2.5<L>  /  TBili  1.3<H>  /  DBili  x   /  AST  22  /  ALT  19  /  AlkPhos  119        Urinalysis Basic - ( 2017 16:48 )    Color: Yellow / Appearance: Slightly Turbid / S.020 / pH: x  Gluc: x / Ketone: Small  / Bili: Negative / Urobili: 4   Blood: x / Protein: 100 / Nitrite: Negative   Leuk Esterase: Negative / RBC: 5-10 /HPF / WBC 3-5 /HPF   Sq Epi: x / Non Sq Epi: Moderate /HPF / Bacteria: Many /HPF          RADIOLOGY & ADDITIONAL STUDIES REVIEWED:     [ ]GOALS OF CARE DISCUSSION WITH PATIENT/FAMILY/PROXY:    CRITICAL CARE TIME SPENT: 35 minutes INTERVAL /OVERNIGHT EVENTS: Pt tried to pull out ET tube overnight    PRESSORS: [ x] YES [ ] NO  WHICH: phenylephrine    ANTIBIOTICS: vancomycin, zosyn, levaquin                 DATE STARTED:                  Antimicrobial:  levoFLOXacin IVPB 750 milliGRAM(s) IV Intermittent every 24 hours  levoFLOXacin IVPB      piperacillin/tazobactam IVPB. 3.375 Gram(s) IV Intermittent every 8 hours  vancomycin  IVPB 1000 milliGRAM(s) IV Intermittent every 12 hours    Cardiovascular:  phenylephrine    Infusion 0.5 MICROgram(s)/kG/Min IV Continuous <Continuous>    Pulmonary: N/A    Hematalogic:  aspirin  chewable 81 milliGRAM(s) Oral daily    Other:  acetaminophen  Suppository 650 milliGRAM(s) Rectal every 6 hours PRN  ascorbic acid 500 milliGRAM(s) Oral daily  atorvastatin 40 milliGRAM(s) Oral at bedtime  calcium gluconate IVPB 1 Gram(s) IV Intermittent once  dexmedetomidine Infusion 0.2 MICROgram(s)/kG/Hr IV Continuous <Continuous>  ferrous    sulfate Liquid 300 milliGRAM(s) Enteral Tube daily  folic acid 1 milliGRAM(s) Oral daily  lactated ringers. 1000 milliLiter(s) IV Continuous <Continuous>  magnesium sulfate  IVPB 2 Gram(s) IV Intermittent every 2 hours  multivitamin 1 Tablet(s) Oral daily  propofol Infusion 5 MICROgram(s)/kG/Min IV Continuous <Continuous>  propylthiouracil 50 milliGRAM(s) Oral daily    acetaminophen  Suppository 650 milliGRAM(s) Rectal every 6 hours PRN  ascorbic acid 500 milliGRAM(s) Oral daily  aspirin  chewable 81 milliGRAM(s) Oral daily  atorvastatin 40 milliGRAM(s) Oral at bedtime  calcium gluconate IVPB 1 Gram(s) IV Intermittent once  dexmedetomidine Infusion 0.2 MICROgram(s)/kG/Hr IV Continuous <Continuous>  ferrous    sulfate Liquid 300 milliGRAM(s) Enteral Tube daily  folic acid 1 milliGRAM(s) Oral daily  lactated ringers. 1000 milliLiter(s) IV Continuous <Continuous>  levoFLOXacin IVPB 750 milliGRAM(s) IV Intermittent every 24 hours  levoFLOXacin IVPB      magnesium sulfate  IVPB 2 Gram(s) IV Intermittent every 2 hours  multivitamin 1 Tablet(s) Oral daily  phenylephrine    Infusion 0.5 MICROgram(s)/kG/Min IV Continuous <Continuous>  piperacillin/tazobactam IVPB. 3.375 Gram(s) IV Intermittent every 8 hours  propofol Infusion 5 MICROgram(s)/kG/Min IV Continuous <Continuous>  propylthiouracil 50 milliGRAM(s) Oral daily  vancomycin  IVPB 1000 milliGRAM(s) IV Intermittent every 12 hours    Drug Dosing Weight  Height (cm): 144.78 (2017 18:44)  Weight (kg): 46.7 (2017 18:44)  BMI (kg/m2): 22.3 (2017 18:44)  BSA (m2): 1.36 (2017 18:44)    CENTRAL LINE: [ x] YES [ ] NO  LOCATION: [facility]  DATE INSERTED:   REMOVE: [ ] YES [ x] NO  EXPLAIN: still in use, within 7 days of insertion    VOSS: [ ] YES [x ] NO        A-LINE:  [ ] YES [x ] NO       ICU Vital Signs Last 24 Hrs  T(C): 37.3 (2017 05:25), Max: 38.9 (2017 18:44)  T(F): 99.1 (2017 05:25), Max: 102.1 (2017 18:44)  HR: 87 (2017 07:00) (59 - 160)  BP: 96/37 (2017 07:00) (57/38 - 168/51)  BP(mean): 51 (2017 07:00) (51 - 85)  RR: 24 (2017 07:00) (14 - 31)  SpO2: 100% (2017 07:00) (94% - 100%)      ABG - ( 2017 05:30 )  pH: 7.38  /  pCO2: 43    /  pO2: 99    / HCO3: 24    / Base Excess: -0.3  /  SaO2: 98               @ 07:01  -   @ 07:00  --------------------------------------------------------  IN: 2936.2 mL / OUT: 485 mL / NET: 2451.2 mL        Mode: AC/ CMV (Assist Control/ Continuous Mandatory Ventilation)  RR (machine): 16  TV (machine): 400  FiO2: 50  PEEP: 5  ITime: 1  MAP: 11  PIP: 23      PHYSICAL EXAM:    GENERAL: no acute distress  HEAD: atraumatic, normocephalic   EYES: PERRL, white sclera.   ENMT: oral cavity moist  NECK: supple, no JVD,  LYMPH: no palpable lymph nodes     SKIN: warm, dry   CHEST/LUNG: ET tube 23 cm at lip. No Chest deformity , no chest tenderness. bilateral breath sounds, no  adventitious sounds  HEART: RRR, no m/r/g   ABDOMEN: soft, nontender, nondistended; bowel sounds.  EXTREMITIES: no edema, no cyanosis, no clubbing.  NEURO: sedated, nonverbal, noncommunicative      LABS:  CBC Full  -  ( 2017 22:30 )  WBC Count : 13.2 K/uL  Hemoglobin : 11.6 g/dL  Hematocrit : 38.6 %  Platelet Count - Automated : 246 K/uL  Mean Cell Volume : 104.3 fl  Mean Cell Hemoglobin : 31.4 pg  Mean Cell Hemoglobin Concentration : 30.1 gm/dL  Auto Neutrophil # : 11.3 K/uL  Auto Lymphocyte # : 0.6 K/uL  Auto Monocyte # : 1.4 K/uL  Auto Eosinophil # : 0.0 K/uL  Auto Basophil # : 0.2 K/uL  Auto Neutrophil % : 83.7 %  Auto Lymphocyte % : 4.6 %  Auto Monocyte % : 10.5 %  Auto Eosinophil % : 0.0 %  Auto Basophil % : 1.2 %        145  |  115<H>  |  25<H>  ----------------------------<  80  3.5   |  15<L>  |  <0.20<L>    Ca    6.2<LL>      2017 06:45  Phos  0.8       Mg     1.2         TPro  6.5  /  Alb  2.5<L>  /  TBili  1.3<H>  /  DBili  x   /  AST  22  /  ALT  19  /  AlkPhos  119        Urinalysis Basic - ( 2017 16:48 )    Color: Yellow / Appearance: Slightly Turbid / S.020 / pH: x  Gluc: x / Ketone: Small  / Bili: Negative / Urobili: 4   Blood: x / Protein: 100 / Nitrite: Negative   Leuk Esterase: Negative / RBC: 5-10 /HPF / WBC 3-5 /HPF   Sq Epi: x / Non Sq Epi: Moderate /HPF / Bacteria: Many /HPF          RADIOLOGY & ADDITIONAL STUDIES REVIEWED:     [ ]GOALS OF CARE DISCUSSION WITH PATIENT/FAMILY/PROXY:    CRITICAL CARE TIME SPENT: 35 minutes

## 2017-11-29 NOTE — PROGRESS NOTE ADULT - ASSESSMENT
Patient was intubated in the ED, tachycardic to 110-130's in the ED, BP improved to 156/72 after starting levophed,  No leucocytosis, but has a left shift, BMP shows hypernatremia to 151, elevated BUN to 45, lactate is 3.6. MCV elevated to 104.1.   ABG on s/p intubation on FiO2 of 100%, TV: 400, PEEP of 5 is pH: 7.22, PaCO2: 66, PaO2: 95, Bicarb: 26, O2 Sat of 100%  s/p 2L NS bolus and vanc and zosyn in the ED  CXR: Patchy right perihilar opacities, which may be due to pneumonia. Mild diffuse interstitial prominence. Small bilateral pleural effusions.  EKG - LVH , LAD , incomplete LBBB, QRS     Admitted to ICU for hypoxic and hypercapnia respiratory failure and septic shock 2/2 to [facility]

## 2017-11-29 NOTE — CONSULT NOTE ADULT - SUBJECTIVE AND OBJECTIVE BOX
A 88 yo Female with right femur neck fx, s/p ORIF, difficulty walking,  brought in to ER by EMS from [facility] for  evaluation of  difficulty of breathing. In the ER, she found to be tachycardic, tachypneic up to 50's, hypotensive to SBP of 50's and lethargic and required intubation. She also has leukocytosis and chest xray shows right sided pneumonia. She has started on Levaquin and zosyn, cultures pending. The ID consult requested to assist with further evaluation and antibiotic  management.           REVIEW OF SYSTEMS: Unable to obtain since intubated, unless mentioned in HPI              PAST MEDICAL & SURGICAL HISTORY:  Psoriasis  Dementia  No significant past surgical history          SOCIAL HISTORY  Alcohol:  Tobacco:  Illicit substance use:          FAMILY HISTORY:        ALLERGIES: NKDA      T(C): 36.2 (17 @ 16:23), Max: 37.8 (17 @ 20:19)  HR: 70 (17 @ 19:00) (59 - 106)  BP: 128/51 (17 @ 19:00) (57/37 - 168/51)  RR: 20 (17 @ 19:00) (17 - 31)  SpO2: 100% (17 @ 19:00) (96% - 100%)  Wt(kg): --  I&O's Summary        PHYSICAL EXAM:  GENERAL: Intubated/sedated  CVS: s1 and s2 present  RESP: Air entry B/L  GI: abdomen soft and nontender  EXT: No pedal  edema   CNS: intubated/sedated            LABS:                        11.6   18.5  )-----------( 206      ( 2017 13:39 )             38.3             151<H>  |  118<H>  |  31<H>  ----------------------------<  99  3.6   |  26  |  0.44<L>    Ca    8.6      2017 16:21  Phos  1.3       Mg     3.2         TPro  4.8<L>  /  Alb  1.7<L>  /  TBili  0.8  /  DBili  x   /  AST  39  /  ALT  29  /  AlkPhos  119      PT/INR - ( 2017 11:02 )   PT: 17.7 sec;   INR: 1.61 ratio         PTT - ( 2017 11:02 )  PTT:41.2 sec  Urinalysis Basic - ( 2017 16:48 )    Color: Yellow / Appearance: Slightly Turbid / S.020 / pH: x  Gluc: x / Ketone: Small  / Bili: Negative / Urobili: 4   Blood: x / Protein: 100 / Nitrite: Negative   Leuk Esterase: Negative / RBC: 5-10 /HPF / WBC 3-5 /HPF   Sq Epi: x / Non Sq Epi: Moderate /HPF / Bacteria: Many /HPF      CAPILLARY BLOOD GLUCOSE        ABG - ( 2017 05:30 )  pH: 7.38  /  pCO2: 43    /  pO2: 99    / HCO3: 24    / Base Excess: -0.3  /  SaO2: 98              MEDICATIONS  (STANDING):  ascorbic acid 500 milliGRAM(s) Oral daily  aspirin  chewable 81 milliGRAM(s) Oral daily  atorvastatin 40 milliGRAM(s) Oral at bedtime  dexmedetomidine Infusion 0.2 MICROgram(s)/kG/Hr (3 mL/Hr) IV Continuous <Continuous>  fentaNYL   Infusion 0.5 MICROgram(s)/kG/Hr (2.335 mL/Hr) IV Continuous <Continuous>  ferrous    sulfate Liquid 300 milliGRAM(s) Enteral Tube daily  folic acid 1 milliGRAM(s) Oral daily  heparin  Injectable 5000 Unit(s) SubCutaneous every 8 hours  lactated ringers. 1000 milliLiter(s) (100 mL/Hr) IV Continuous <Continuous>  levoFLOXacin IVPB 750 milliGRAM(s) IV Intermittent every 24 hours  levoFLOXacin IVPB      multivitamin 1 Tablet(s) Oral daily  phenylephrine    Infusion 0.5 MICROgram(s)/kG/Min (5.625 mL/Hr) IV Continuous <Continuous>  piperacillin/tazobactam IVPB. 3.375 Gram(s) IV Intermittent every 8 hours  propylthiouracil 50 milliGRAM(s) Oral daily  vancomycin  IVPB 1000 milliGRAM(s) IV Intermittent every 12 hours    MEDICATIONS  (PRN):  acetaminophen  Suppository 650 milliGRAM(s) Rectal every 6 hours PRN For Temp greater than 38 C (100.4 F)              RADIOLOGY & ADDITIONAL TESTS:    17 : Xray Chest 1 View AP/PA (17 @ 05:48) : Increased lung volumes consistent with COPD. Bones are again quite  demineralized. Endotracheal tube is in good position. Nasogastric tube courses into the abdominal area off the lower edge of the film. Heart is grossly normal in size.  Significant infiltrates are seen again bilaterally but most pronounced in the right perihilar area.      17 : Xray Chest 1 View AP -PORTABLE-Routine (17 @ 16:28) : Patchy right perihilar opacities, which may be due to pneumonia. Mild  diffuse interstitial prominence. Small bilateral pleural effusions. A 88 yo Female with right femur neck fx, difficulty walking,  brought in to ER by EMS from [facility] for  evaluation of  difficulty of breathing. In the ER, she found to be tachycardic, tachypneic up to 50's, hypotensive to SBP of 50's and lethargic and required intubation. She also has leukocytosis and chest xray shows right sided pneumonia. She has started on Levaquin, Vancomycin and zosyn, cultures pending. The ID consult requested to assist with further evaluation and antibiotic  management.           REVIEW OF SYSTEMS: Unable to obtain since intubated, unless mentioned in HPI              PAST MEDICAL & SURGICAL HISTORY:  Psoriasis  Dementia  Right femur neck fracture  No significant past surgical history            SOCIAL HISTORY  Alcohol: Does not drink  Tobacco: Does not smoke  Illicit substance use: None          FAMILY HISTORY: Non contributory to the present illness          ALLERGIES: NKDA            T(C): 36.2 (17 @ 16:23), Max: 37.8 (17 @ 20:19)  HR: 70 (17 @ 19:00) (59 - 106)  BP: 128/51 (17 @ 19:00) (57/37 - 168/51)  RR: 20 (17 @ 19:00) (17 - 31)  SpO2: 100% (17 @ 19:00) (96% - 100%)  Wt(kg): --  I&O's Summary            PHYSICAL EXAM:    GENERAL: Intubated/sedated  CVS: s1 and s2 present  RESP: Air entry B/L with very mild coarse BS  GI: abdomen soft and nontender  EXT: No pedal  edema   CNS: intubated/sedated            LABS:                            11.6   18.5  )-----------( 206      ( 2017 13:39 )             38.3             151<H>  |  118<H>  |  31<H>  ----------------------------<  99  3.6   |  26  |  0.44<L>    Ca    8.6      2017 16:21  Phos  1.3       Mg     3.2         TPro  4.8<L>  /  Alb  1.7<L>  /  TBili  0.8  /  DBili  x   /  AST  39  /  ALT  29  /  AlkPhos  119  11-    PT/INR - ( 2017 11:02 )   PT: 17.7 sec;   INR: 1.61 ratio         PTT - ( 2017 11:02 )  PTT:41.2 sec  Urinalysis Basic - ( 2017 16:48 )    Color: Yellow / Appearance: Slightly Turbid / S.020 / pH: x  Gluc: x / Ketone: Small  / Bili: Negative / Urobili: 4   Blood: x / Protein: 100 / Nitrite: Negative   Leuk Esterase: Negative / RBC: 5-10 /HPF / WBC 3-5 /HPF   Sq Epi: x / Non Sq Epi: Moderate /HPF / Bacteria: Many /HPF      CAPILLARY BLOOD GLUCOSE        ABG - ( 2017 05:30 )  pH: 7.38  /  pCO2: 43    /  pO2: 99    / HCO3: 24    / Base Excess: -0.3  /  SaO2: 98                MEDICATIONS  (STANDING):  ascorbic acid 500 milliGRAM(s) Oral daily  aspirin  chewable 81 milliGRAM(s) Oral daily  atorvastatin 40 milliGRAM(s) Oral at bedtime  dexmedetomidine Infusion 0.2 MICROgram(s)/kG/Hr (3 mL/Hr) IV Continuous <Continuous>  fentaNYL   Infusion 0.5 MICROgram(s)/kG/Hr (2.335 mL/Hr) IV Continuous <Continuous>  ferrous    sulfate Liquid 300 milliGRAM(s) Enteral Tube daily  folic acid 1 milliGRAM(s) Oral daily  heparin  Injectable 5000 Unit(s) SubCutaneous every 8 hours  lactated ringers. 1000 milliLiter(s) (100 mL/Hr) IV Continuous <Continuous>  levoFLOXacin IVPB 750 milliGRAM(s) IV Intermittent every 24 hours  levoFLOXacin IVPB      multivitamin 1 Tablet(s) Oral daily  phenylephrine    Infusion 0.5 MICROgram(s)/kG/Min (5.625 mL/Hr) IV Continuous <Continuous>  piperacillin/tazobactam IVPB. 3.375 Gram(s) IV Intermittent every 8 hours  propylthiouracil 50 milliGRAM(s) Oral daily  vancomycin  IVPB 1000 milliGRAM(s) IV Intermittent every 12 hours    MEDICATIONS  (PRN):  acetaminophen  Suppository 650 milliGRAM(s) Rectal every 6 hours PRN For Temp greater than 38 C (100.4 F)              RADIOLOGY & ADDITIONAL TESTS:    17 : Xray Chest 1 View AP/PA (17 @ 05:48) : Increased lung volumes consistent with COPD. Bones are again quite  demineralized. Endotracheal tube is in good position. Nasogastric tube courses into the abdominal area off the lower edge of the film. Heart is grossly normal in size.  Significant infiltrates are seen again bilaterally but most pronounced in the right perihilar area.      17 : Xray Chest 1 View AP -PORTABLE-Routine (17 @ 16:28) : Patchy right perihilar opacities, which may be due to pneumonia. Mild  diffuse interstitial prominence. Small bilateral pleural effusions.            MICROBIOLOGY DATA:    Rapid Respiratory Viral Panel (17 @ 19:15)    Rapid RVP Result: Detected: The [employer] RVP Rapid uses polymerase chain reaction (PCR) and melt  curve analysis to screen for adenovirus; coronavirus HKU1, NL63, 229E,  OC43; human metapneumovirus (hMPV); human enterovirus/rhinovirus  (Entero/RV); influenza A; influenza A/H1;influenza A/H3; influenza  A/H1-2009; influenza B; parainfluenza viruses 1, 2, 3, 4; respiratory  syncytial virus; Bordetella pertussis; Mycoplasma pneumoniae; and  Chlamydophila pneumoniae.

## 2017-11-29 NOTE — CONSULT NOTE ADULT - ASSESSMENT
Patient is a 89/F from [facility], with PMHx of right femur neck fracture, iron deficiency anemia, Hyperthyroidism ( patient on PTU at home) constipation, difficulty walking, was BIB EMS from NH for c/o difficulty breathing.   Patient was intubated in the ED, tachycardic to 110-130's in the ED, BP improved to 156/72 after starting levophed,  Admitted to ICU for hypoxic and hypercapnia respiratory failure and septic shock 2/2 to [facility]  Cardiology consulted for Acute MI and atrial fibrillation with RVR     Acute MI         Septic shock.   2/2 RLL pneumonia   c/w vanco + zosyn + levaquin  c/w levophed , titrate for MAP of 65  F/u repeat ECHO    Respiratory failure.     hypoxic and hypercapnia respiratory failure 2/2 RLL pneumonia   c/w sedation , titrate for RASS score of -2  c/w levophed , titrate for MAP of 65  full dose lovenox 1 dose given suspected PE ( recent hip fracture ).     : Prophylactic measure.  Plan: c/w GI ppx and DVT as ordered  IMPROVE - 2. Patient is a 89/F from [facility], with PMHx of right femur neck fracture, iron deficiency anemia, Hyperthyroidism ( patient on PTU at home) constipation, difficulty walking, was BIB EMS from NH for c/o difficulty breathing.   Patient was intubated in the ED, tachycardic to 110-130's in the ED, BP improved to 156/72 after starting levophed,  Admitted to ICU for hypoxic and hypercapnia respiratory failure and septic shock 2/2 to [facility]  Cardiology consulted for Acute MI and atrial fibrillation with RVR     Acute MI         Septic shock.   2/2 RLL pneumonia   c/w vanco + zosyn + levaquin  c/w phenylaephrine , titrate for MAP of 65  F/u repeat ECHO    Respiratory failure.     hypoxic and hypercapnia respiratory failure 2/2 RLL pneumonia   c/w sedation , titrate for RASS score of -2    full dose lovenox 1 dose given suspected PE ( recent hip fracture ).     : Prophylactic measure.  Plan: c/w GI ppx and DVT as ordered  IMPROVE - 2. Patient is a 89/F from [facility], with PMHx of right femur neck fracture, iron deficiency anemia, Hyperthyroidism ( patient on PTU at home) constipation, difficulty walking, was BIB EMS from NH for c/o difficulty breathing.   Patient was intubated in the ED, tachycardic to 110-130's in the ED, BP improved to 156/72 after starting levophed,  Admitted to ICU for hypoxic and hypercapnia respiratory failure and septic shock 2/2 to [facility]  Cardiology consulted for Acute MI and atrial fibrillation with RVR     Paroxysmal atrial fibrillation  Likely secondary to pressors   EKG - LVH , LAD , incomplete LBBB, QRS   T1 T2 elevated at 1.5, trended down T3 to 1.03  monitor on tele  c/w aspirin , statin through NG tube      Septic shock.   2/2 RLL pneumonia   c/w vanco + zosyn + levaquin  c/w phenylaephrine , titrate for MAP of 65  F/u repeat ECHO    Respiratory failure.     hypoxic and hypercapnia respiratory failure 2/2 RLL pneumonia   c/w sedation , titrate for RASS score of -2    full dose lovenox 1 dose given suspected PE ( recent hip fracture ).     : Prophylactic measure.  Plan: c/w GI ppx and DVT as ordered  IMPROVE - 2.

## 2017-11-29 NOTE — PROGRESS NOTE ADULT - PROBLEM SELECTOR PLAN 1
2/2 RLL pneumonia   qsofa - 3/3   lactate - 3.9 ,tachycardic, no leucocytosis, febrile 101.9  c/w vanco + zosyn + levaquin  c/w LR @ 100 cc  ABG on s/p intubation on FiO2 of 50%, TV: 400, PEEP of 5 is   pH: 7.38, PaCO2: 43, PaO2: 99, Bicarb: 24, O2 Sat of 98%  c/w sedation , titrate for RASS   c/w levophed , titrate for MAP of 65  f/u BCx, UCx  lactate increased to 7  hypoxic and hypercapnia respiratory failure  addendum - changed levophed to vasopressin for persistent tachycardia  RVP positive  f/u legionella 2/2 RLL pneumonia, likely gram negative organism due to likely aspiration  qsofa - 3/3   lactate - 3.9 ,tachycardic, no leucocytosis, febrile 101.9  c/w vanco + zosyn + levaquin  c/w LR @ 100 cc  ABG on s/p intubation on FiO2 of 50%, TV: 400, PEEP of 5 is   pH: 7.38, PaCO2: 43, PaO2: 99, Bicarb: 24, O2 Sat of 98%  c/w sedation , titrate for RASS   c/w levophed , titrate for MAP of 65  f/u BCx, UCx  lactate increased to 7  hypoxic and hypercapnia respiratory failure  addendum - changed levophed to vasopressin for persistent tachycardia  RVP positive  f/u legionella

## 2017-11-29 NOTE — CHART NOTE - NSCHARTNOTEFT_GEN_A_CORE
Patient needs Central line for pressors, and has no family for consent  2 PC consent obtained from Dr Soni and Dr Dinh  Even during prior admission for hip fracture, consent for OR was obtained from 2 physicians

## 2017-11-29 NOTE — CONSULT NOTE ADULT - ASSESSMENT
A 88 yo Female with right femur neck fx, s/p ORIF, difficulty walking,  brought in to ER by EMS from [facility] for  evaluation of  difficulty of breathing. In the ER, she found to be tachycardic, tachypneic up to 50's, hypotensive to SBP of 50's and lethargic and required intubation. She also has leukocytosis and chest xray shows right sided pneumonia. She has started on Levaquin and zosyn, cultures pending. The ID consult requested to assist with further evaluation and antibiotic  management.     # Septic shock  # Right sided pneumonia    Would recommend:  1. Follow up Legionella urine Ag  2. Follow up Blood cultures  3. Obtain tracheal aspirate/sputum culture  4. Continue current antimicrobials until work up is done  5. Monitor WBC  counts  6. Management of vent. as per ICU protocol    d/w ICU team    will follow the patient with you and make further recommendation based on the clinical course and Lab results  Thank you for the opportunity to participate in Ms. Hagan's care A 89 yo Female with right femur neck fx, difficulty walking,  brought in to ER by EMS from [facility] for  evaluation of  difficulty of breathing. In the ER, she found to be tachycardic, tachypneic up to 50's, hypotensive to SBP of 50's and lethargic and required intubation. She also has leukocytosis and chest xray shows right sided pneumonia. She has started on Levaquin, Vancomycin and zosyn, cultures pending. The ID consult requested to assist with further evaluation and antibiotic  management.       # Septic shock  # Right sided pneumonia  # RSV    Would recommend:  1. Follow up Legionella urine Ag  2. Follow up Blood cultures  3. Obtain tracheal aspirate/sputum culture  4. Continue current antimicrobials until work up is done  5. Monitor WBC  counts  6. Management of vent. as per ICU protocol  7. Supportive care for RSV      d/w ICU team    will follow the patient with you and make further recommendation based on the clinical course and Lab results  Thank you for the opportunity to participate in Ms. Hagan's care

## 2017-11-29 NOTE — PROGRESS NOTE ADULT - PROBLEM SELECTOR PLAN 3
EKG - LVH , LAD , incomplete LBBB, QRS   T1 T2 elevated at 1.5, trended down T3 to 1.03  monitor on tele  c/w aspirin , statin through NG tube   no BB due to hypotension EKG - LVH , LAD , incomplete LBBB, QRS   T1 T2 elevated at 1.5, trended down T3 to 1.03  monitor on tele  c/w aspirin , statin through NG tube   no BB due to hypotension  pt had acute onset of Afib-given one dose amiodarone and converted back to sinus rhythm  f/u cardio consult = Dr. Garrison

## 2017-11-29 NOTE — PROGRESS NOTE ADULT - SUBJECTIVE AND OBJECTIVE BOX
· Subjective and Objective: 	  INTERVAL /OVERNIGHT EVENTS: Pt tried to pull out ET tube overnight    PRESSORS: [ x] YES [ ] NO  WHICH: phenylephrine    ANTIBIOTICS: vancomycin, zosyn, levaquin                 DATE STARTED:                  Antimicrobial:  levoFLOXacin IVPB 750 milliGRAM(s) IV Intermittent every 24 hours  levoFLOXacin IVPB      piperacillin/tazobactam IVPB. 3.375 Gram(s) IV Intermittent every 8 hours  vancomycin  IVPB 1000 milliGRAM(s) IV Intermittent every 12 hours    Cardiovascular:  phenylephrine    Infusion 0.5 MICROgram(s)/kG/Min IV Continuous <Continuous>    Pulmonary: N/A    Hematalogic:  aspirin  chewable 81 milliGRAM(s) Oral daily    Other:  acetaminophen  Suppository 650 milliGRAM(s) Rectal every 6 hours PRN  ascorbic acid 500 milliGRAM(s) Oral daily  atorvastatin 40 milliGRAM(s) Oral at bedtime  calcium gluconate IVPB 1 Gram(s) IV Intermittent once  dexmedetomidine Infusion 0.2 MICROgram(s)/kG/Hr IV Continuous <Continuous>  ferrous    sulfate Liquid 300 milliGRAM(s) Enteral Tube daily  folic acid 1 milliGRAM(s) Oral daily  lactated ringers. 1000 milliLiter(s) IV Continuous <Continuous>  magnesium sulfate  IVPB 2 Gram(s) IV Intermittent every 2 hours  multivitamin 1 Tablet(s) Oral daily  propofol Infusion 5 MICROgram(s)/kG/Min IV Continuous <Continuous>  propylthiouracil 50 milliGRAM(s) Oral daily    acetaminophen  Suppository 650 milliGRAM(s) Rectal every 6 hours PRN  ascorbic acid 500 milliGRAM(s) Oral daily  aspirin  chewable 81 milliGRAM(s) Oral daily  atorvastatin 40 milliGRAM(s) Oral at bedtime  calcium gluconate IVPB 1 Gram(s) IV Intermittent once  dexmedetomidine Infusion 0.2 MICROgram(s)/kG/Hr IV Continuous <Continuous>  ferrous    sulfate Liquid 300 milliGRAM(s) Enteral Tube daily  folic acid 1 milliGRAM(s) Oral daily  lactated ringers. 1000 milliLiter(s) IV Continuous <Continuous>  levoFLOXacin IVPB 750 milliGRAM(s) IV Intermittent every 24 hours  levoFLOXacin IVPB      magnesium sulfate  IVPB 2 Gram(s) IV Intermittent every 2 hours  multivitamin 1 Tablet(s) Oral daily  phenylephrine    Infusion 0.5 MICROgram(s)/kG/Min IV Continuous <Continuous>  piperacillin/tazobactam IVPB. 3.375 Gram(s) IV Intermittent every 8 hours  propofol Infusion 5 MICROgram(s)/kG/Min IV Continuous <Continuous>  propylthiouracil 50 milliGRAM(s) Oral daily  vancomycin  IVPB 1000 milliGRAM(s) IV Intermittent every 12 hours    Drug Dosing Weight  Height (cm): 144.78 (2017 18:44)  Weight (kg): 46.7 (2017 18:44)  BMI (kg/m2): 22.3 (2017 18:44)  BSA (m2): 1.36 (2017 18:44)    CENTRAL LINE: [ x] YES [ ] NO  LOCATION: [facility]  DATE INSERTED:   REMOVE: [ ] YES [ x] NO  EXPLAIN: still in use, within 7 days of insertion    VOSS: [ ] YES [x ] NO        A-LINE:  [ ] YES [x ] NO       ICU Vital Signs Last 24 Hrs  T(C): 37.3 (2017 05:25), Max: 38.9 (2017 18:44)  T(F): 99.1 (2017 05:25), Max: 102.1 (2017 18:44)  HR: 87 (2017 07:00) (59 - 160)  BP: 96/37 (2017 07:00) (57/38 - 168/51)  BP(mean): 51 (2017 07:00) (51 - 85)  RR: 24 (2017 07:00) (14 - 31)  SpO2: 100% (2017 07:00) (94% - 100%)      ABG - ( 2017 05:30 )  pH: 7.38  /  pCO2: 43    /  pO2: 99    / HCO3: 24    / Base Excess: -0.3  /  SaO2: 98                     @ 07:01  -   @ 07:00  --------------------------------------------------------  IN: 2936.2 mL / OUT: 485 mL / NET: 2451.2 mL        Mode: AC/ CMV (Assist Control/ Continuous Mandatory Ventilation)  RR (machine): 16  TV (machine): 400  FiO2: 50  PEEP: 5  ITime: 1  MAP: 11  PIP: 23      PHYSICAL EXAM:    GENERAL:   HEAD: atraumatic, normocephalic   EYES: PERRLA, white sclera.   ENMT: nasal mucosa- Oral cavity-   NECK: supple, JVD/ no JVD, thyroid-   LYMPH: no palpable lymph nodes     SKIN: warm, dry   CHEST/LUNG: No Chest deformity , no chest tenderness. bilateral breath sounds, no  adventitious sounds  HEART: RRR, no m/r/g   ABDOMEN: soft, nontender, nondistended; bowel sounds.  EXTREMITIES: +1 non-pitting edema, no cyanosis, no clubbing.  NEURO: AA0X , mood/ affect-, no focal neuro deficits        LABS:  CBC Full  -  ( 2017 22:30 )  WBC Count : 13.2 K/uL  Hemoglobin : 11.6 g/dL  Hematocrit : 38.6 %  Platelet Count - Automated : 246 K/uL  Mean Cell Volume : 104.3 fl  Mean Cell Hemoglobin : 31.4 pg  Mean Cell Hemoglobin Concentration : 30.1 gm/dL  Auto Neutrophil # : 11.3 K/uL  Auto Lymphocyte # : 0.6 K/uL  Auto Monocyte # : 1.4 K/uL  Auto Eosinophil # : 0.0 K/uL  Auto Basophil # : 0.2 K/uL  Auto Neutrophil % : 83.7 %  Auto Lymphocyte % : 4.6 %  Auto Monocyte % : 10.5 %  Auto Eosinophil % : 0.0 %  Auto Basophil % : 1.2 %        145  |  115<H>  |  25<H>  ----------------------------<  80  3.5   |  15<L>  |  <0.20<L>    Ca    6.2<LL>      2017 06:45  Phos  0.8       Mg     1.2         TPro  6.5  /  Alb  2.5<L>  /  TBili  1.3<H>  /  DBili  x   /  AST  22  /  ALT  19  /  AlkPhos  119        Urinalysis Basic - ( 2017 16:48 )    Color: Yellow / Appearance: Slightly Turbid / S.020 / pH: x  Gluc: x / Ketone: Small  / Bili: Negative / Urobili: 4   Blood: x / Protein: 100 / Nitrite: Negative   Leuk Esterase: Negative / RBC: 5-10 /HPF / WBC 3-5 /HPF   Sq Epi: x / Non Sq Epi: Moderate /HPF / Bacteria: Many /HPF          RADIOLOGY & ADDITIONAL STUDIES REVIEWED:     [ ]GOALS OF CARE DISCUSSION WITH PATIENT/FAMILY/PROXY:    CRITICAL CARE TIME SPENT: 35 minutes    Assessment and Plan:   · Assessment		  Patient was intubated in the ED, tachycardic to 110-130's in the ED, BP improved to 156/72 after starting levophed,  No leucocytosis, but has a left shift, BMP shows hypernatremia to 151, elevated BUN to 45, lactate is 3.6. MCV elevated to 104.1.   ABG on s/p intubation on FiO2 of 100%, TV: 400, PEEP of 5 is pH: 7.22, PaCO2: 66, PaO2: 95, Bicarb: 26, O2 Sat of 100%  s/p 2L NS bolus and vanc and zosyn in the ED  CXR: Patchy right perihilar opacities, which may be due to pneumonia. Mild diffuse interstitial prominence. Small bilateral pleural effusions.  EKG - LVH , LAD , incomplete LBBB, QRS     Admitted to ICU for hypoxic and hypercapnia respiratory failure and septic shock 2/2 to PNA            Problem/Plan - 1:  ·  Problem: Septic shock.  Plan: 2/2 RLL pneumonia   qsofa - 3/3   lactate - 3.9 ,tachycardic, no leucocytosis, febrile 101.9  c/w vanco + zosyn + levaquin  c/w LR @ 100 cc  ABG on s/p intubation on FiO2 of 50%, TV: 400, PEEP of 5 is   pH: 7.38, PaCO2: 43, PaO2: 99, Bicarb: 24, O2 Sat of 98%  c/w sedation , titrate for RASS   c/w levophed , titrate for MAP of 65  f/u BCx, UCx  lactate increased to 7  hypoxic and hypercapnia respiratory failure  addendum - changed levophed to vasopressin for persistent tachycardia  RVP positive  f/u legionella.      Problem/Plan - 2:  ·  Problem: Respiratory failure.  Plan: hypoxic and hypercapnia respiratory failure 2/2 RLL pneumonia   suspecting aspiration   BG on s/p intubation on FiO2 of 100%, TV: 400, PEEP of 5 is   pH: 7.22, PaCO2: 66, PaO2: 95, Bicarb: 26, O2 Sat of 100%  c/w sedation , titrate for RASS score of -2  c/w levophed , titrate for MAP of 65  full dose lovenox 1 dose given suspected PE ( recent hip fracture ).      Problem/Plan - 3:  ·  Problem: EKG abnormalities.  Plan: EKG - LVH , LAD , incomplete LBBB, QRS   T1 T2 elevated at 1.5, trended down T3 to 1.03  monitor on tele  c/w aspirin , statin through NG tube   no BB due to hypotension.      Problem/Plan - 4:  ·  Problem: Hypernatremia.  Plan: RESOLVED s/p 2L bolus and 100cc/hr lactate.      Problem/Plan - 5:  ·  Problem: Prophylactic measure.  Plan: c/w GI ppx and DVT as ordered  IMPROVE - 2.

## 2017-11-30 NOTE — DIETITIAN INITIAL EVALUATION ADULT. - MD RECOMMEND
Osmolite 1.2 @40 ml/h x 24h, MD to adjust free water as needed ( 960 ml, 1152 kcal, 53g protein, 787ml water/d)

## 2017-11-30 NOTE — DIETITIAN INITIAL EVALUATION ADULT. - ADHERENCE
n/a/diet Rx at [facility] nursing facility: chopped, no added salt, thin liquid, LPS 30 ml daily

## 2017-11-30 NOTE — PROGRESS NOTE ADULT - SUBJECTIVE AND OBJECTIVE BOX
· Subjective and Objective: 	  INTERVAL HPI/OVERNIGHT EVENTS:events for overnight noticed    PRESSORS: YES WHICH: Levo    ANTIBIOTICS: Zoysn                 DATE STARTED:     Antimicrobial:  piperacillin/tazobactam IVPB. 3.375 Gram(s) IV Intermittent every 12 hours    Cardiovascular:  amiodarone    Tablet 200 milliGRAM(s) Oral daily  norepinephrine Infusion 0.1 MICROgram(s)/kG/Min IV Continuous <Continuous>  phenylephrine    Infusion 1 MICROgram(s)/kG/Min IV Continuous <Continuous>    Pulmonary:  ALBUTerol/ipratropium for Nebulization 3 milliLiter(s) Nebulizer every 6 hours    Hematalogic:    Other:  atorvastatin 40 milliGRAM(s) Oral at bedtime  dextrose 5% 1000 milliLiter(s) IV Continuous <Continuous>  fentaNYL    Injectable 25 MICROGram(s) IV Push every 1 hour PRN  insulin lispro (HumaLOG) corrective regimen sliding scale   SubCutaneous three times a day before meals  ondansetron Injectable 4 milliGRAM(s) IV Push every 6 hours PRN  pantoprazole  Injectable 40 milliGRAM(s) IV Push daily  sucralfate 1 Gram(s) Oral four times a day    ALBUTerol/ipratropium for Nebulization 3 milliLiter(s) Nebulizer every 6 hours  amiodarone    Tablet 200 milliGRAM(s) Oral daily  atorvastatin 40 milliGRAM(s) Oral at bedtime  dextrose 5% 1000 milliLiter(s) IV Continuous <Continuous>  fentaNYL    Injectable 25 MICROGram(s) IV Push every 1 hour PRN  insulin lispro (HumaLOG) corrective regimen sliding scale   SubCutaneous three times a day before meals  norepinephrine Infusion 0.1 MICROgram(s)/kG/Min IV Continuous <Continuous>  ondansetron Injectable 4 milliGRAM(s) IV Push every 6 hours PRN  pantoprazole  Injectable 40 milliGRAM(s) IV Push daily  phenylephrine    Infusion 1 MICROgram(s)/kG/Min IV Continuous <Continuous>  piperacillin/tazobactam IVPB. 3.375 Gram(s) IV Intermittent every 12 hours  sucralfate 1 Gram(s) Oral four times a day    Drug Dosing Weight    Weight (kg): 68 (2017 19:45)    CENTRAL LINE: [] YES [] NO  LOCATION:   DATE INSERTED:  REMOVE: [] YES [] NO  EXPLAIN:    VOSS: [] YES [] NO    DATE INSERTED:  REMOVE:  [] YES [] NO  EXPLAIN:    A-LINE:  [] YES [] NO  LOCATION:   DATE INSERTED:  REMOVE:  [] YES [] NO  EXPLAIN:    PMH -reviewed admission note, no change since admission  PAST MEDICAL & SURGICAL HISTORY:  Prostate cancer metastatic to multiple sites  COPD (chronic obstructive pulmonary disease)  HLD (hyperlipidemia)  HTN (hypertension)  Glaucoma  Afib  DM (diabetes mellitus)      ICU Vital Signs Last 24 Hrs  T(C): 37.3 (2017 04:50), Max: 37.6 (2017 19:33)  T(F): 99.2 (2017 04:50), Max: 99.7 (2017 23:54)  HR: 96 (2017 03:30) (79 - 104)  BP: 104/49 (2017 03:30) (77/58 - 112/49)  BP(mean): 61 (2017 03:30) (50 - 89)  ABP: --  ABP(mean): --  RR: 26 (2017 03:30) (15 - 37)  SpO2: 100% (2017 03:30) (94% - 100%)      ABG - ( 2017 13:56 )  pH: 7.28  /  pCO2: 18    /  pO2: 93    / HCO3: 8     / Base Excess: -16.9 /  SaO2: 96                     @ 07:01  -   @ 07:00  --------------------------------------------------------  IN: 1129.3 mL / OUT: 0 mL / NET: 1129.3 mL            PHYSICAL EXAM:    GENERAL: [x]Mild AD, []well-groomed, []well-developed  HEAD:  [x]Atraumatic, []Normocephalic  EYES: [x]EOMI, []PERRLA, []conjunctiva and sclera clear  ENMT: [x]No tonsillar erythema, exudates, or enlargement; []Moist mucous membranes, []Good dentition, []No lesions  NECK: [x]Supple, normal appearance, []No JVD; []Normal thyroid; [x]Trachea midline  NERVOUS SYSTEM:  [x]Alert & Oriented X3, []Good concentration; []Motor Strength 5/5 B/L upper and lower extremities; []DTRs 2+ intact and symmetric  CHEST/LUNG: [x]No chest deformity; []Normal percussion bilaterally; []No rales, rhonchi, wheezing   HEART: [x]Regular rate and rhythm; []No murmurs, rubs, or gallops  ABDOMEN: []Soft, Nontender, Nondistended; [x]Bowel sounds present  EXTREMITIES:  [x]2+ Peripheral Pulses, []No clubbing, cyanosis, or edema  LYMPH: [x]No lymphadenopathy noted  SKIN: [x]No rashes or lesions; []Good capillary refill      LABS:  CBC Full  -  ( 2017 14:48 )  WBC Count : 12.8 K/uL  Hemoglobin : 9.3 g/dL  Hematocrit : 28.7 %  Platelet Count - Automated : 267 K/uL  Mean Cell Volume : 94.9 fl  Mean Cell Hemoglobin : 30.8 pg  Mean Cell Hemoglobin Concentration : 32.5 gm/dL  Auto Neutrophil # : x  Auto Lymphocyte # : x  Auto Monocyte # : x  Auto Eosinophil # : x  Auto Basophil # : x  Auto Neutrophil % : x  Auto Lymphocyte % : x  Auto Monocyte % : x  Auto Eosinophil % : x  Auto Basophil % : x        137  |  107  |  83<H>  ----------------------------<  112<H>  3.3<L>   |  13<L>  |  3.87<H>    Ca    6.9<L>      2017 05:45  Phos  5.9       Mg     2.6         TPro  5.8<L>  /  Alb  1.8<L>  /  TBili  0.6  /  DBili  x   /  AST  182<H>  /  ALT  23  /  AlkPhos  209<H>        Urinalysis Basic - ( 2017 07:03 )    Color: Yellow / Appearance: very cloudy / S.020 / pH: x  Gluc: x / Ketone: Trace  / Bili: Small / Urobili: 1   Blood: x / Protein: 500 mg/dL / Nitrite: Positive   Leuk Esterase: Moderate / RBC: >50 /HPF / WBC >50 /HPF   Sq Epi: x / Non Sq Epi: Moderate /HPF / Bacteria: Many /HPF          RADIOLOGY & ADDITIONAL STUDIES REVIEWED:  ***    []GOALS OF CARE DISCUSSION WITH PATIENT/FAMILY/PROXY:    CRITICAL CARE TIME SPENT: 35 minutes    Assessment and Plan:   · Assessment		  80 M PMH HTN, Afib, CAD s/p CABG x 4, HLD, DM, Metastatic Prostate CA (Lung, Liver, LAD, local invasion), GERD, COPD, Sacral ulcer p/w AMS and hypotension - admitted to ICU for septic shock likely secondary to UTI requiring pressors  - Family meeting at 4PM     Problem/Plan - 1:  ·  Problem: Sepsis.  Plan: Sepsis secondary to urinary infection (+UA)  - Sacral ulcer Stage 2, no surrounding cellulitis  - Persistent low grade leukocytosis but afebrile, still requires pressors but lactate resolved < 2  ***CT shows no hydronephrosis, extensive metastatic disease, and bilateral perinephric stranding  ID Dr Oconnell  - Treating w/ IV Zoysn  ***F/u BCx.      Problem/Plan - 2:  ·  Problem: UTI (urinary tract infection).  Plan: +UA but poor UO  - C/w Zoysn  ***UCx from Nephrostomy (L) pending; brown output, increased overnight.      Problem/Plan - 3:  ·  Problem: Renal failure.  Plan: Nephrology Dr. Bishop  - No further w/u for CKD evaluation  - No significant UO  ***Bilateral perinephric stranding suggests pyelonephritis.      Problem/Plan - 4:  ·  Problem: Nausea & vomiting.  Plan: GI Dr. Chew consult appreciated  - CT no obstruction  - Beginning Sulcralfate and Zofran  ***CLD and AAT.      Problem/Plan - 5:  ·  Problem: Hematuria.  Plan: CT shows collapsed bladder w/ Voss balloon inflated in urethra  ***Exchanged Voss; no significant UO, hematuria noted  - H&H stable.      Problem/Plan - 6:  Problem: Encephalopathy. Plan: Resolved; patient AAO x3.     Problem/Plan - 7:  ·  Problem: Afib.  Plan: S/p pacemaker  - C/w Amiodarone at reduced dose 200 daily as per Garrison  - Holding Eliquis 2/2 Hematuria  ***Monitor H&H.      Problem/Plan - 8:  ·  Problem: COPD (chronic obstructive pulmonary disease).  Plan: Not in exacerbation  - C/w Duoneb PRN.      Problem/Plan - 9:  ·  Problem: Prostate cancer metastatic to multiple sites.  Plan: Patient not aware of diagnosis as per family, palliative consult appreciated  - MOLST form in chart  - Pain treated w/ Fentanyl 25 mcg IV PRN  ***Family meeting at 4PM; agreeable for hospice.      Problem/Plan - 10:  Problem: Need for prophylactic measure. Plan; IMPROVE score 4, DVT PPx w/ SCD - no HSQ 2/2 hematuria.

## 2017-11-30 NOTE — PROGRESS NOTE ADULT - SUBJECTIVE AND OBJECTIVE BOX
Patient is seen and examined at the bed side, spiked fever but currently is afebrile.  She remains intubated and on pressors. The Leukocytosis is trending down and blood culture grew  Haemophilus influenzae. The urine culture is negative.            REVIEW OF SYSTEMS: Unable to obtain since intubated/sedated            Vital Signs Last 24 Hrs  T(C): 36.6 (2017 20:53), Max: 39 (2017 08:00)  T(F): 97.9 (2017 20:53), Max: 102.2 (2017 08:00)  HR: 59 (:) (55 - 99)  BP: 119/63 (:) (109/48 - 133/57)  BP(mean): 76 (:) (60 - 76)  RR: 16 (:) (16 - 22)  SpO2: 100% (:) (72% - 100%)              PHYSICAL EXAM:    GENERAL: Intubated/sedated  HEENT: ET tube in placed  CVS: s1 and s2 present  RESP: Air entry B/L with very mild coarse BS  GI: abdomen soft and nontender  EXT: No pedal  edema, Right hip staples in placed, site looks clean, no  redness  CNS: intubated/sedated              ALLERGIES: NKDA                  LABS:                            11.4   17.4  )-----------( 196      ( 2017 07:15 )             37.6                               11.6   18.5  )-----------( 206      ( 2017 13:39 )             38.3                   147<H>  |  114<H>  |  22<H>  ----------------------------<  91  3.9   |  29  |  0.24<L>    Ca    8.2<L>      2017 17:34  Phos  1.7       Mg     1.9         TPro  4.8<L>  /  Alb  1.7<L>  /  TBili  0.8  /  DBili  x   /  AST  39  /  ALT  29  /  AlkPhos  119          TPro  4.8<L>  /  Alb  1.7<L>  /  TBili  0.8  /  DBili  x   /  AST  39  /  ALT  29  /  AlkPhos  119  11-    PT/INR - ( 2017 11:02 )   PT: 17.7 sec;   INR: 1.61 ratio         PTT - ( 2017 11:02 )  PTT:41.2 sec  Urinalysis Basic - ( 2017 16:48 )    Color: Yellow / Appearance: Slightly Turbid / S.020 / pH: x  Gluc: x / Ketone: Small  / Bili: Negative / Urobili: 4   Blood: x / Protein: 100 / Nitrite: Negative   Leuk Esterase: Negative / RBC: 5-10 /HPF / WBC 3-5 /HPF   Sq Epi: x / Non Sq Epi: Moderate /HPF / Bacteria: Many /HPF              ABG - ( 2017 05:30 )  pH: 7.38  /  pCO2: 43    /  pO2: 99    / HCO3: 24    / Base Excess: -0.3  /  SaO2: 98                MEDICATIONS  (STANDING):  ascorbic acid 500 milliGRAM(s) Oral daily  aspirin  chewable 81 milliGRAM(s) Oral daily  atorvastatin 40 milliGRAM(s) Oral at bedtime  dexmedetomidine Infusion 0.2 MICROgram(s)/kG/Hr (3 mL/Hr) IV Continuous <Continuous>  ergocalciferol 96741 Unit(s) Oral <User Schedule>  fentaNYL   Infusion 0.5 MICROgram(s)/kG/Hr (2.335 mL/Hr) IV Continuous <Continuous>  ferrous    sulfate Liquid 300 milliGRAM(s) Enteral Tube daily  folic acid 1 milliGRAM(s) Oral daily  heparin  Injectable 5000 Unit(s) SubCutaneous every 8 hours  multivitamin 1 Tablet(s) Oral daily  pantoprazole  Injectable 40 milliGRAM(s) IV Push daily  phenylephrine    Infusion 0.5 MICROgram(s)/kG/Min (5.625 mL/Hr) IV Continuous <Continuous>  piperacillin/tazobactam IVPB. 3.375 Gram(s) IV Intermittent every 8 hours  propylthiouracil 50 milliGRAM(s) Oral daily    MEDICATIONS  (PRN):  acetaminophen  Suppository 650 milliGRAM(s) Rectal every 6 hours PRN For Temp greater than 38 C (100.4 F)                RADIOLOGY & ADDITIONAL TESTS:    17 : Xray Chest 1 View AP/PA (17 @ 05:48) : Increased lung volumes consistent with COPD. Bones are again quite  demineralized. Endotracheal tube is in good position. Nasogastric tube courses into the abdominal area off the lower edge of the film. Heart is grossly normal in size.  Significant infiltrates are seen again bilaterally but most pronounced in the right perihilar area.      17 : Xray Chest 1 View AP -PORTABLE-Routine (17 @ 16:28) : Patchy right perihilar opacities, which may be due to pneumonia. Mild  diffuse interstitial prominence. Small bilateral pleural effusions.            MICROBIOLOGY DATA:    Culture - Sputum . (17 @ 11:55)    Gram Stain:   Moderate polymorphonuclear leukocytes per low power field  Rare Squamous epithelial cells per low power field  Rare Gram Positive Cocci per oil power field    Specimen Source: .Sputum Sputum - trap        Legionella pneumophila Antigen, Urine (17 @ 17:17)    Legionella Antigen, Urine: Negative          Culture - Blood (17 @ 00:47)    -  Haemophilus influenzae: Detec    Gram Stain:   Growth in anaerobic bottle:  Gram Negative Coccobacilli    Specimen Source: .Blood Blood-Venous    Organism: Blood Culture PCR    Culture Results:   Growth in anaerobic bottle:  Gram Negative Coccobacilli  ***Blood Panel PCR results on this specimen are available  approximately 3 hours after the Gram stain result.***  Gram stain, PCR, and/or culture results may not always  correspond due to difference in methodologies.  ************************************************************  This PCR assay was performed using [employer].  The following targets are tested for: Enterococcus,  vancomycin resistant enterococci, Listeria monocytogenes,  coagulase negative staphylococci, S. aureus,  methicillin resistant S. aureus, Streptococcus agalactiae  (Group B), S. pneumoniae, S. pyogenes (Group A),  Acinetobacter baumannii, Enterobacter cloacae, E. coli,  Klebsiella oxytoca, K. pneumoniae,Proteus sp.,  Serratia marcescens, Haemophilus influenzae,  Neisseria meningitidis, Pseudomonas aeruginosa, Candida  albicans, C. glabrata, C krusei, C parapsilosis,  C. tropicalis and the KPC resistance gene.    Organism Identification: Blood Culture PCR    Method Type: PCR          Culture - Blood (17 @ 00:47)    Specimen Source: .Blood Blood-Peripheral    Culture Results:   No growth to date.      Culture - Urine (17 @ 00:19)    Specimen Source: .Urine Catheterized    Culture Results:   No growth            Rapid Respiratory Viral Panel (17 @ 19:15)    Rapid RVP Result: Detected: The [employer] RVP Rapid uses polymerase chain reaction (PCR) and melt  curve analysis to screen for adenovirus; coronavirus HKU1, NL63, 229E,  OC43; human metapneumovirus (hMPV); human enterovirus/rhinovirus  (Entero/RV); influenza A; influenza A/H1;influenza A/H3; influenza  A/H1-2009; influenza B; parainfluenza viruses 1, 2, 3, 4; respiratory  syncytial virus; Bordetella pertussis; Mycoplasma pneumoniae; and  Chlamydophila pneumoniae.

## 2017-11-30 NOTE — PROGRESS NOTE ADULT - PROBLEM SELECTOR PLAN 3
EKG - LVH , LAD , incomplete LBBB, QRS   T1 T2 elevated at 1.5, trended down T3 to 1.03  monitor on tele  c/w aspirin , statin through NG tube   no BB due to hypotension  pt had acute onset of Afib-given one dose amiodarone and converted back to sinus rhythm  f/u repeat echo  f/u cardio consult = Dr. Garrison

## 2017-11-30 NOTE — PROGRESS NOTE ADULT - SUBJECTIVE AND OBJECTIVE BOX
INTERVAL /OVERNIGHT EVENTS: no events overnight    PRESSORS: [x ] YES [ ] NO  WHICH: phenylephrine    ANTIBIOTICS: Vancomycin, Zosyn, Levaquin                 DATE STARTED:       Antimicrobial:  levoFLOXacin IVPB 750 milliGRAM(s) IV Intermittent every 24 hours  levoFLOXacin IVPB      piperacillin/tazobactam IVPB. 3.375 Gram(s) IV Intermittent every 8 hours  vancomycin  IVPB 1000 milliGRAM(s) IV Intermittent every 12 hours    Cardiovascular:  phenylephrine    Infusion 0.5 MICROgram(s)/kG/Min IV Continuous <Continuous>    Pulmonary: N/A    Hematalogic:  aspirin  chewable 81 milliGRAM(s) Oral daily  heparin  Injectable 5000 Unit(s) SubCutaneous every 8 hours    Other:  acetaminophen  Suppository 650 milliGRAM(s) Rectal every 6 hours PRN  ascorbic acid 500 milliGRAM(s) Oral daily  atorvastatin 40 milliGRAM(s) Oral at bedtime  dexmedetomidine Infusion 0.2 MICROgram(s)/kG/Hr IV Continuous <Continuous>  fentaNYL   Infusion 0.5 MICROgram(s)/kG/Hr IV Continuous <Continuous>  ferrous    sulfate Liquid 300 milliGRAM(s) Enteral Tube daily  folic acid 1 milliGRAM(s) Oral daily  lactated ringers. 1000 milliLiter(s) IV Continuous <Continuous>  multivitamin 1 Tablet(s) Oral daily  propylthiouracil 50 milliGRAM(s) Oral daily    acetaminophen  Suppository 650 milliGRAM(s) Rectal every 6 hours PRN  ascorbic acid 500 milliGRAM(s) Oral daily  aspirin  chewable 81 milliGRAM(s) Oral daily  atorvastatin 40 milliGRAM(s) Oral at bedtime  dexmedetomidine Infusion 0.2 MICROgram(s)/kG/Hr IV Continuous <Continuous>  fentaNYL   Infusion 0.5 MICROgram(s)/kG/Hr IV Continuous <Continuous>  ferrous    sulfate Liquid 300 milliGRAM(s) Enteral Tube daily  folic acid 1 milliGRAM(s) Oral daily  heparin  Injectable 5000 Unit(s) SubCutaneous every 8 hours  lactated ringers. 1000 milliLiter(s) IV Continuous <Continuous>  levoFLOXacin IVPB 750 milliGRAM(s) IV Intermittent every 24 hours  levoFLOXacin IVPB      multivitamin 1 Tablet(s) Oral daily  phenylephrine    Infusion 0.5 MICROgram(s)/kG/Min IV Continuous <Continuous>  piperacillin/tazobactam IVPB. 3.375 Gram(s) IV Intermittent every 8 hours  propylthiouracil 50 milliGRAM(s) Oral daily  vancomycin  IVPB 1000 milliGRAM(s) IV Intermittent every 12 hours    Drug Dosing Weight  Height (cm): 144.78 (2017 18:44)  Weight (kg): 46.7 (2017 18:44)  BMI (kg/m2): 22.3 (2017 18:44)  BSA (m2): 1.36 (2017 18:44)    CENTRAL LINE: [x ] YES [ ] NO  LOCATION:  [facility] DATE INSERTED:   REMOVE: [ ] YES [x ] NO  EXPLAIN: within 7 days of insertion, site is clean, line is functioning    MORALES: [x ] YES [ ] NO    DATE INSERTED:   REMOVE:  [ ] YES [ x] NO  EXPLAIN: Morales inserted yesterday, no sedimentation or signs of infection    A-LINE:  [ ] YES [x ] NO      ICU Vital Signs Last 24 Hrs  T(C): 37.9 (2017 05:02), Max: 37.9 (2017 23:52)  T(F): 100.2 (2017 05:02), Max: 100.2 (2017 23:52)  HR: 69 (2017 07:00) (64 - 84)  BP: 112/51 (2017 07:00) (57/37 - 143/45)  BP(mean): 66 (2017 07:00) (41 - 91)  RR: 16 (2017 07:00) (16 - 27)  SpO2: 100% (2017 07:00) (98% - 100%)      ABG - ( 2017 05:42 )  pH: 7.32  /  pCO2: 53    /  pO2: 116   / HCO3: 27    / Base Excess: 0.4   /  SaO2: 98                     @ 07:  -   @ 07:00  --------------------------------------------------------  IN: 4093.9 mL / OUT: 980 mL / NET: 3113.9 mL        Mode: AC/ CMV (Assist Control/ Continuous Mandatory Ventilation)  RR (machine): 16  TV (machine): 400  FiO2: 50  PEEP: 5  ITime: 1  MAP: 9  PIP: 29      PHYSICAL EXAM:    GENERAL: no acute distress  HEAD: atraumatic, normocephalic   EYES: PERRL, white sclera.   ENMT: oral cavity moist  NECK: supple, no JVD  LYMPH: no palpable lymph nodes     SKIN: warm, dry   CHEST/LUNG: ET tube: size 23 cm at lip. No Chest deformity , no chest tenderness. bilateral breath sounds,no  adventitious sounds  HEART: RRR, no m/r/g   ABDOMEN: soft, nontender, nondistended; bowel sounds.  :morales catheter.  EXTREMITIES: +1 non-pitting edema, no cyanosis, no clubbing.  NEURO: sedated, noncommunicative      LABS:  CBC Full  -  ( 2017 13:39 )  WBC Count : 18.5 K/uL  Hemoglobin : 11.6 g/dL  Hematocrit : 38.3 %  Platelet Count - Automated : 206 K/uL  Mean Cell Volume : 101.8 fl  Mean Cell Hemoglobin : 30.8 pg  Mean Cell Hemoglobin Concentration : 30.2 gm/dL  Auto Neutrophil # : x  Auto Lymphocyte # : x  Auto Monocyte # : x  Auto Eosinophil # : x  Auto Basophil # : x  Auto Neutrophil % : x  Auto Lymphocyte % : x  Auto Monocyte % : x  Auto Eosinophil % : x  Auto Basophil % : x        151<H>  |  118<H>  |  31<H>  ----------------------------<  99  3.6   |  26  |  0.44<L>    Ca    8.6      2017 16:21  Phos  1.3       Mg     3.2         TPro  4.8<L>  /  Alb  1.7<L>  /  TBili  0.8  /  DBili  x   /  AST  39  /  ALT  29  /  AlkPhos  119      PT/INR - ( 2017 11:02 )   PT: 17.7 sec;   INR: 1.61 ratio         PTT - ( 2017 11:02 )  PTT:41.2 sec  Urinalysis Basic - ( 2017 16:48 )    Color: Yellow / Appearance: Slightly Turbid / S.020 / pH: x  Gluc: x / Ketone: Small  / Bili: Negative / Urobili: 4   Blood: x / Protein: 100 / Nitrite: Negative   Leuk Esterase: Negative / RBC: 5-10 /HPF / WBC 3-5 /HPF   Sq Epi: x / Non Sq Epi: Moderate /HPF / Bacteria: Many /HPF      Culture Results:   No growth to date. ( @ 00:47)  Culture Results:   Growth in anaerobic bottle:  Gram Negative Coccobacilli  ***Blood Panel PCR results on this specimen are available  approximately 3 hours after the Gram stain result.***  Gram stain, PCR, and/or culture results may not always  correspond due to difference in methodologies.  ************************************************************  This PCR assay was performed using [employer].  The following targets are tested for: Enterococcus,  vancomycin resistant enterococci, Listeria monocytogenes,  coagulase negative staphylococci, S. aureus,  methicillin resistant S. aureus, Streptococcus agalactiae  (Group B), S. pneumoniae, S. pyogenes (Group A),  Acinetobacter baumannii, Enterobacter cloacae, E. coli,  Klebsiella oxytoca, K. pneumoniae,Proteus sp.,  Serratia marcescens, Haemophilus influenzae,  Neisseria meningitidis, Pseudomonas aeruginosa, Candida  albicans, C. glabrata, C krusei, C parapsilosis,  C. tropicalis and the KPC resistance gene. ( @ 00:47)  Culture Results:   No growth ( @ 00:19)      RADIOLOGY & ADDITIONAL STUDIES REVIEWED:     [ ]GOALS OF CARE DISCUSSION WITH PATIENT/FAMILY/PROXY:    CRITICAL CARE TIME SPENT: 35 minutes INTERVAL /OVERNIGHT EVENTS: no events overnight    PRESSORS: [x ] YES [ ] NO  WHICH: phenylephrine    ANTIBIOTICS: Vancomycin, Zosyn, Levaquin                 DATE STARTED:     Antimicrobial:  levoFLOXacin IVPB 750 milliGRAM(s) IV Intermittent every 24 hours  levoFLOXacin IVPB      piperacillin/tazobactam IVPB. 3.375 Gram(s) IV Intermittent every 8 hours  vancomycin  IVPB 1000 milliGRAM(s) IV Intermittent every 12 hours    Cardiovascular:  phenylephrine    Infusion 0.5 MICROgram(s)/kG/Min IV Continuous <Continuous>    Pulmonary: N/A    Hematalogic:  aspirin  chewable 81 milliGRAM(s) Oral daily  heparin  Injectable 5000 Unit(s) SubCutaneous every 8 hours    Other:  acetaminophen  Suppository 650 milliGRAM(s) Rectal every 6 hours PRN  ascorbic acid 500 milliGRAM(s) Oral daily  atorvastatin 40 milliGRAM(s) Oral at bedtime  dexmedetomidine Infusion 0.2 MICROgram(s)/kG/Hr IV Continuous <Continuous>  fentaNYL   Infusion 0.5 MICROgram(s)/kG/Hr IV Continuous <Continuous>  ferrous    sulfate Liquid 300 milliGRAM(s) Enteral Tube daily  folic acid 1 milliGRAM(s) Oral daily  lactated ringers. 1000 milliLiter(s) IV Continuous <Continuous>  multivitamin 1 Tablet(s) Oral daily  propylthiouracil 50 milliGRAM(s) Oral daily    acetaminophen  Suppository 650 milliGRAM(s) Rectal every 6 hours PRN  ascorbic acid 500 milliGRAM(s) Oral daily  aspirin  chewable 81 milliGRAM(s) Oral daily  atorvastatin 40 milliGRAM(s) Oral at bedtime  dexmedetomidine Infusion 0.2 MICROgram(s)/kG/Hr IV Continuous <Continuous>  fentaNYL   Infusion 0.5 MICROgram(s)/kG/Hr IV Continuous <Continuous>  ferrous    sulfate Liquid 300 milliGRAM(s) Enteral Tube daily  folic acid 1 milliGRAM(s) Oral daily  heparin  Injectable 5000 Unit(s) SubCutaneous every 8 hours  lactated ringers. 1000 milliLiter(s) IV Continuous <Continuous>  levoFLOXacin IVPB 750 milliGRAM(s) IV Intermittent every 24 hours  levoFLOXacin IVPB      multivitamin 1 Tablet(s) Oral daily  phenylephrine    Infusion 0.5 MICROgram(s)/kG/Min IV Continuous <Continuous>  piperacillin/tazobactam IVPB. 3.375 Gram(s) IV Intermittent every 8 hours  propylthiouracil 50 milliGRAM(s) Oral daily  vancomycin  IVPB 1000 milliGRAM(s) IV Intermittent every 12 hours    Drug Dosing Weight  Height (cm): 144.78 (2017 18:44)  Weight (kg): 46.7 (2017 18:44)  BMI (kg/m2): 22.3 (2017 18:44)  BSA (m2): 1.36 (2017 18:44)    CENTRAL LINE: [x ] YES [ ] NO  LOCATION:  [facility] DATE INSERTED:   REMOVE: [ ] YES [x ] NO  EXPLAIN: within 7 days of insertion, site is clean, line is functioning    MORALES: [x ] YES [ ] NO    DATE INSERTED:   REMOVE:  [ ] YES [ x] NO  EXPLAIN: Morales inserted yesterday, no sedimentation or signs of infection    A-LINE:  [ ] YES [x ] NO      ICU Vital Signs Last 24 Hrs  T(C): 37.9 (2017 05:02), Max: 37.9 (2017 23:52)  T(F): 100.2 (2017 05:02), Max: 100.2 (2017 23:52)  HR: 69 (2017 07:00) (64 - 84)  BP: 112/51 (2017 07:00) (57/37 - 143/45)  BP(mean): 66 (2017 07:00) (41 - 91)  RR: 16 (2017 07:00) (16 - 27)  SpO2: 100% (2017 07:00) (98% - 100%)      ABG - ( 2017 05:42 )  pH: 7.32  /  pCO2: 53    /  pO2: 116   / HCO3: 27    / Base Excess: 0.4   /  SaO2: 98                     @ 07:  -   @ 07:00  --------------------------------------------------------  IN: 4093.9 mL / OUT: 980 mL / NET: 3113.9 mL        Mode: AC/ CMV (Assist Control/ Continuous Mandatory Ventilation)  RR (machine): 16  TV (machine): 400  FiO2: 50  PEEP: 5  ITime: 1  MAP: 9  PIP: 29      PHYSICAL EXAM:    GENERAL: no acute distress  HEAD: atraumatic, normocephalic   EYES: PERRL, white sclera.   ENMT: oral cavity moist  NECK: supple, no JVD  LYMPH: no palpable lymph nodes     SKIN: warm, dry   CHEST/LUNG: ET tube: size 23 cm at lip. No Chest deformity , no chest tenderness. bilateral breath sounds, LLL slight wheezing  HEART: RRR, no m/r/g   ABDOMEN: soft, nontender, nondistended; bowel sounds.  :morales catheter.  EXTREMITIES: no edema, no cyanosis, no clubbing.  NEURO: sedated, noncommunicative      LABS:  CBC Full  -  ( 2017 13:39 )  WBC Count : 18.5 K/uL  Hemoglobin : 11.6 g/dL  Hematocrit : 38.3 %  Platelet Count - Automated : 206 K/uL  Mean Cell Volume : 101.8 fl  Mean Cell Hemoglobin : 30.8 pg  Mean Cell Hemoglobin Concentration : 30.2 gm/dL  Auto Neutrophil # : x  Auto Lymphocyte # : x  Auto Monocyte # : x  Auto Eosinophil # : x  Auto Basophil # : x  Auto Neutrophil % : x  Auto Lymphocyte % : x  Auto Monocyte % : x  Auto Eosinophil % : x  Auto Basophil % : x        151<H>  |  118<H>  |  31<H>  ----------------------------<  99  3.6   |  26  |  0.44<L>    Ca    8.6      2017 16:21  Phos  1.3       Mg     3.2         TPro  4.8<L>  /  Alb  1.7<L>  /  TBili  0.8  /  DBili  x   /  AST  39  /  ALT  29  /  AlkPhos  119  -    PT/INR - ( 2017 11:02 )   PT: 17.7 sec;   INR: 1.61 ratio         PTT - ( 2017 11:02 )  PTT:41.2 sec  Urinalysis Basic - ( 2017 16:48 )    Color: Yellow / Appearance: Slightly Turbid / S.020 / pH: x  Gluc: x / Ketone: Small  / Bili: Negative / Urobili: 4   Blood: x / Protein: 100 / Nitrite: Negative   Leuk Esterase: Negative / RBC: 5-10 /HPF / WBC 3-5 /HPF   Sq Epi: x / Non Sq Epi: Moderate /HPF / Bacteria: Many /HPF      Culture Results:   No growth to date. ( @ 00:47)  Culture Results:   Growth in anaerobic bottle:  Gram Negative Coccobacilli  ***Blood Panel PCR results on this specimen are available  approximately 3 hours after the Gram stain result.***  Gram stain, PCR, and/or culture results may not always  correspond due to difference in methodologies.  ************************************************************  This PCR assay was performed using [employer].  The following targets are tested for: Enterococcus,  vancomycin resistant enterococci, Listeria monocytogenes,  coagulase negative staphylococci, S. aureus,  methicillin resistant S. aureus, Streptococcus agalactiae  (Group B), S. pneumoniae, S. pyogenes (Group A),  Acinetobacter baumannii, Enterobacter cloacae, E. coli,  Klebsiella oxytoca, K. pneumoniae,Proteus sp.,  Serratia marcescens, Haemophilus influenzae,  Neisseria meningitidis, Pseudomonas aeruginosa, Candida  albicans, C. glabrata, C krusei, C parapsilosis,  C. tropicalis and the KPC resistance gene. ( @ 00:47)  Culture Results:   No growth ( @ 00:19)      RADIOLOGY & ADDITIONAL STUDIES REVIEWED:     [ ]GOALS OF CARE DISCUSSION WITH PATIENT/FAMILY/PROXY:    CRITICAL CARE TIME SPENT: 35 minutes

## 2017-11-30 NOTE — PROGRESS NOTE ADULT - SUBJECTIVE AND OBJECTIVE BOX
Patient is a 89y old  Female who presents with a chief complaint of respiratory distress (2017 17:02)      HPI:  Patient is a 89/F from [facility], with PMHx of right femur neck fracture, iron deficiency anemia, Hyperthyroidism ( patient on PTU at home) constipation, difficulty walking, was BIB EMS from NH for c/o difficulty breathing. As per ED attending, patient was tachycardic, tachypneic up to 50's, hypotensive to SBP of 50's and lethargic and needed to be intubated in the ER. Patient is Full Code as per NH papers.  Social Hx: cannot obtain at this time.   Surgical Hx and Fhx cannot be obtained at this time  Next of Kin as per NH papers: Mamie Hagan, Daughter, unable to get contact number of pt's daughter, full code (2017 17:02)      PAST MEDICAL & SURGICAL HISTORY:  Psoriasis  Dementia  No significant past surgical history    PREVIOUS DIAGNOSTIC TESTING:      ECHO  FINDINGS:   1. Mild mitral regurgitation.  2. Normal left ventricular internal dimensions and wall  thicknesses.  3. Normal left ventricular function. Mild diastolic  dysfunction (Stage I).  4. Normal right ventricular size and function.  5. RV systolic pressure is 56 mm Hg. Moderate pulmonary  hypertension.  Performed on  2017 - 10:05:46     MEDICATIONS  (STANDING):  ascorbic acid 500 milliGRAM(s) Oral daily  aspirin  chewable 81 milliGRAM(s) Oral daily  atorvastatin 40 milliGRAM(s) Oral at bedtime  calcium gluconate IVPB 1 Gram(s) IV Intermittent once  dexmedetomidine Infusion 0.2 MICROgram(s)/kG/Hr (3 mL/Hr) IV Continuous <Continuous>  fentaNYL   Infusion 0.5 MICROgram(s)/kG/Hr (2.335 mL/Hr) IV Continuous <Continuous>  ferrous    sulfate Liquid 300 milliGRAM(s) Enteral Tube daily  folic acid 1 milliGRAM(s) Oral daily  heparin  Injectable 5000 Unit(s) SubCutaneous every 8 hours  lactated ringers. 1000 milliLiter(s) (100 mL/Hr) IV Continuous <Continuous>  levoFLOXacin IVPB 750 milliGRAM(s) IV Intermittent every 24 hours  levoFLOXacin IVPB      magnesium sulfate  IVPB 2 Gram(s) IV Intermittent every 2 hours  multivitamin 1 Tablet(s) Oral daily  phenylephrine    Infusion 0.5 MICROgram(s)/kG/Min (5.625 mL/Hr) IV Continuous <Continuous>  piperacillin/tazobactam IVPB. 3.375 Gram(s) IV Intermittent every 8 hours  potassium phosphate IVPB 30 milliMole(s) IV Intermittent every 6 hours  propylthiouracil 50 milliGRAM(s) Oral daily  vancomycin  IVPB 1000 milliGRAM(s) IV Intermittent every 12 hours    MEDICATIONS  (PRN):  acetaminophen  Suppository 650 milliGRAM(s) Rectal every 6 hours PRN For Temp greater than 38 C (100.4 F)    ROS ;Patient intubated . Unable to obtain    PHYSICAL EXAM:  GENERAL:   HEAD: atraumatic, normocephalic   SKIN: warm, dry   CHEST/LUNG: No Chest deformity , no chest tenderness. bilateral breath sounds, no  adventitious sounds  HEART: RRR, no m/r/g   ABDOMEN: soft, nontender, nondistended; bowel sounds.  EXTREMITIES: +1 non-pitting edema, no cyanosis, no clubbing.    Vital Signs Last 24 Hrs  T(C): 37.3 (2017 05:25), Max: 38.9 (2017 18:44)  T(F): 99.1 (2017 05:25), Max: 102.1 (2017 18:44)  HR: 72 (2017 08:22) (59 - 160)  BP: 96/37 (2017 07:00) (57/38 - 168/51)  BP(mean): 51 (2017 07:00) (51 - 85)  RR: 24 (2017 07:00) (14 - 31)  SpO2: 100% (2017 08:22) (94% - 100%)      --------------------------------------------------------  IN:    IV PiggyBack: 50 mL    lactated ringers.: 1100 mL    phenylephrine   Infusion: 420 mL    propofol Infusion: 116.2 mL    Sodium Chloride 0.9% IV Bolus: 1000 mL    Solution: 250 mL  Total IN: 2936.2 mL    OUT:    Indwelling Catheter - Urethral: 485 mL  Total OUT: 485 mL    Total NET: 2451.2 mL          LABS:                        8.5    9.6   )-----------( 127      ( 2017 06:45 )             25.4         145  |  115<H>  |  25<H>  ----------------------------<  80  3.5   |  15<L>  |  <0.20<L>    Ca    6.2<LL>      2017 06:45  Phos  0.8       Mg     1.2         TPro  6.5  /  Alb  2.5<L>  /  TBili  1.3<H>  /  DBili  x   /  AST  22  /  ALT  19  /  AlkPhos  119      CARDIAC MARKERS ( 2017 06:45 )  1.030 ng/mL / x     / 187 U/L / x     / 3.6 ng/mL  CARDIAC MARKERS ( 2017 22:30 )  1.560 ng/mL / x     / 91 U/L / x     / 5.2 ng/mL  CARDIAC MARKERS ( 2017 16:57 )  1.540 ng/mL / x     / 39 U/L / x     / 4.7 ng/mL        Urinalysis Basic - ( 2017 16:48 )    Color: Yellow / Appearance: Slightly Turbid / S.020 / pH: x  Gluc: x / Ketone: Small  / Bili: Negative / Urobili: 4   Blood: x / Protein: 100 / Nitrite: Negative   Leuk Esterase: Negative / RBC: 5-10 /HPF / WBC 3-5 /HPF   Sq Epi: x / Non Sq Epi: Moderate /HPF / Bacteria: Many /HPF      I&O's Summary    2017 07:01  -  2017 07:00  --------------------------------------------------------  IN: 2936.2 mL / OUT: 485 mL / NET: 2451.2 mL      BNP  RADIOLOGY & ADDITIONAL STUDIES:

## 2017-11-30 NOTE — PROGRESS NOTE ADULT - ASSESSMENT
A 88 yo Female with right femur neck fx, difficulty walking,  brought in to ER by EMS from [facility] for  evaluation of  difficulty of breathing. In the ER, she found to be tachycardic, tachypneic up to 50's, hypotensive to SBP of 50's and lethargic and required intubation. She also has leukocytosis and chest xray shows right sided pneumonia. She has started on Levaquin, Vancomycin and zosyn, cultures pending. The ID consult requested to assist with further evaluation and antibiotic  management.       # Septic shock- on pressor  # Right sided pneumonia  # RSV  # Bacteremia-  Haemophilus influenzae      Would recommend:  1. Repeat Blood culture to document clearing the blood stream  2. Follow up sensitivity of  Haemophilus influenzae  3. Follow up final tracheal aspirate/sputum culture  4. Discontinue Vancomycin and Levaquin and continue zosyn  5. Monitor WBC  counts, is trending down slowly  6. Management of vent. as per ICU protocol  7. Supportive care for RSV      d/w ICU team    will follow the patient with you

## 2017-11-30 NOTE — PROGRESS NOTE ADULT - PROBLEM SELECTOR PLAN 2
hypoxic and hypercapnia respiratory failure 2/2 RLL pneumonia   suspecting aspiration   BG on s/p intubation on FiO2 of 100%, TV: 400, PEEP of 5 is   pH: 7.22, PaCO2: 66, PaO2: 95, Bicarb: 26, O2 Sat of 100%  c/w sedation , titrate for RASS score of -2  c/w levophed , titrate for MAP of 65 hypoxic and hypercapnia respiratory failure 2/2 RLL pneumonia, found to be bacteremic with H. influenza, RVP positive  BG on s/p intubation on FiO2 of 100%, TV: 400, PEEP of 5 is   pH: 7.22, PaCO2: 66, PaO2: 95, Bicarb: 26, O2 Sat of 100%  c/w sedation , titrate for RASS score of -2  c/w levophed , titrate for MAP of 65

## 2017-11-30 NOTE — PROGRESS NOTE ADULT - ASSESSMENT
Patient is a 89/F from [facility], with PMHx of right femur neck fracture, iron deficiency anemia, Hyperthyroidism ( patient on PTU at home) constipation, difficulty walking, was BIB EMS from NH for c/o difficulty breathing.   Patient was intubated in the ED, tachycardic to 110-130's in the ED, BP improved to 156/72 after starting levophed,  Admitted to ICU for hypoxic and hypercapnia respiratory failure and septic shock 2/2 to [facility]  Cardiology consulted for Acute MI and atrial fibrillation with RVR     Paroxysmal atrial fibrillation  Likely secondary to pressors   EKG - LVH , LAD , incomplete LBBB, QRS   T1 T2 elevated at 1.5, trended down T3 to 1.03  monitor on tele  c/w aspirin , statin through NG tube      Septic shock.   2/2 RLL pneumonia   c/w vanco + zosyn + levaquin  c/w phenylaephrine , titrate for MAP of 65  F/u repeat ECHO    Respiratory failure.     hypoxic and hypercapnia respiratory failure 2/2 RLL pneumonia   c/w sedation , titrate for RASS score of -2    full dose lovenox 1 dose given suspected PE ( recent hip fracture ).     : Prophylactic measure.  Plan: c/w GI ppx and DVT as ordered  IMPROVE - 2.

## 2017-11-30 NOTE — PROGRESS NOTE ADULT - ASSESSMENT
Patient was intubated in the ED, tachycardic to 110-130's in the ED, BP improved to 156/72 after starting levophed,  No leucocytosis, but has a left shift, BMP shows hypernatremia to 151, elevated BUN to 45, lactate is 3.6. MCV elevated to 104.1.   ABG on s/p intubation on FiO2 of 100%, TV: 400, PEEP of 5 is pH: 7.22, PaCO2: 66, PaO2: 95, Bicarb: 26, O2 Sat of 100%  s/p 2L NS bolus and vanc and zosyn in the ED  CXR: Patchy right perihilar opacities, which may be due to pneumonia. Mild diffuse interstitial prominence. Small bilateral pleural effusions.  EKG - LVH , LAD , incomplete LBBB, QRS     Admitted to ICU for hypoxic and hypercapnia respiratory failure and septic shock 2/2 to [facility] Patient was intubated in the ED, tachycardic to 110-130's in the ED, BP improved to 156/72 after starting levophed,  No leucocytosis, but has a left shift, BMP shows hypernatremia to 151, elevated BUN to 45, lactate is 3.6. MCV elevated to 104.1.   ABG on s/p intubation on FiO2 of 100%, TV: 400, PEEP of 5 is pH: 7.22, PaCO2: 66, PaO2: 95, Bicarb: 26, O2 Sat of 100%  s/p 2L NS bolus and vanc and zosyn in the ED  CXR: Patchy right perihilar opacities, which may be due to pneumonia. Mild diffuse interstitial prominence. Small bilateral pleural effusions.  EKG - LVH , LAD , incomplete LBBB, QRS     Admitted to ICU for hypoxic and hypercapnia respiratory failure and septic shock requiring pressors 2/2 PNA found to be bacteremic with H. influenza

## 2017-11-30 NOTE — PROGRESS NOTE ADULT - PROBLEM SELECTOR PLAN 1
2/2 RLL pneumonia, likely gram negative organism due to likely aspiration  qsofa - 3/3   lactate - 3.9 ,tachycardic, no leucocytosis, afebrile overnight Tmax 100.2  c/w vanco + zosyn + levaquin  c/w LR @ 100 cc  ABG on s/p intubation on FiO2 of 50%, TV: 400, PEEP of 5 is   pH: 7.38, PaCO2: 43, PaO2: 99, Bicarb: 24, O2 Sat of 98%  c/w fentanyl, precedex , titrate for RASS   c/w phenylephrine , titrate for MAP of 65  lactate increased to 7 yesterday AM, came down to 2.2 in PM, this AM lactate is   hypoxic and hypercapnia respiratory failure  RVP positive  legionella - negative  BCx positive for gram negative coccobacillus - Haemophilus influenzae  f/u UCx 2/2 RLL pneumonia, likely gram negative organism due to likely aspiration  qsofa - 3/3   lactate - 3.9 ,tachycardic, no leucocytosis, afebrile overnight Tmax 100.2  c/w vanco + zosyn  c/w LR @ 100 cc  ABG on s/p intubation on FiO2 of 50%, TV: 400, PEEP of 5 is   pH: 7.38, PaCO2: 43, PaO2: 99, Bicarb: 24, O2 Sat of 98%  c/w fentanyl, precedex , titrate for RASS   c/w phenylephrine , titrate for MAP of 65  lactate increased to 7 yesterday AM, came down to 2.2 in PM, this AM lactate is 1.7  RVP positive  legionella - negative, d/c'ed levaquin  BCx positive for gram negative coccobacillus - Haemophilus influenzae, c/w vanc and zosyn  f/u UCx  CVP low, gave another bolus of lactate  titrating to CVP of 12-14

## 2017-11-30 NOTE — DIETITIAN INITIAL EVALUATION ADULT. - OTHER INFO
nutrition assessment for ICU LOS; from SN; skin intact; no GI distress reported; on Vent, NGT in place, TF Rx ordered by MD

## 2017-12-01 NOTE — PROGRESS NOTE ADULT - ASSESSMENT
Patient was intubated in the ED, tachycardic to 110-130's in the ED, BP improved to 156/72 after starting levophed,  No leucocytosis, but has a left shift, BMP shows hypernatremia to 151, elevated BUN to 45, lactate is 3.6. MCV elevated to 104.1.   ABG on s/p intubation on FiO2 of 100%, TV: 400, PEEP of 5 is pH: 7.22, PaCO2: 66, PaO2: 95, Bicarb: 26, O2 Sat of 100%  s/p 2L NS bolus and vanc and zosyn in the ED  CXR: Patchy right perihilar opacities, which may be due to pneumonia. Mild diffuse interstitial prominence. Small bilateral pleural effusions.  EKG - LVH , LAD , incomplete LBBB, QRS     Admitted to ICU for hypoxic and hypercapnia respiratory failure and septic shock requiring pressors 2/2 PNA found to be bacteremic with H. influenza

## 2017-12-01 NOTE — PROGRESS NOTE ADULT - SUBJECTIVE AND OBJECTIVE BOX
Patient is seen and examined at the bed side,  is afebrile today.  But she remains intubated and on pressors. The Leukocytosis is trending back up. The repeat blood culture is negative to date.  The sputum culture has no growth.              REVIEW OF SYSTEMS: Unable to obtain since intubated/sedated            ICU Vital Signs Last 24 Hrs  T(C): 37.4 (01 Dec 2017 20:26), Max: 37.4 (01 Dec 2017 20:26)  T(F): 99.4 (01 Dec 2017 20:26), Max: 99.4 (01 Dec 2017 20:26)  HR: 80 (01 Dec 2017 20:00) (59 - 102)  BP: 141/48 (01 Dec 2017 20:00) (107/53 - 144/51)  BP(mean): 69 (01 Dec 2017 20:00) (58 - 83)  ABP: --  ABP(mean): --  RR: 17 (01 Dec 2017 20:00) (16 - 21)  SpO2: 100% (01 Dec 2017 20:00) (95% - 100%)            PHYSICAL EXAM:    GENERAL: Intubated/sedated  HEENT: ET tube in placed  CVS: s1 and s2 present  RESP: Air entry B/L with very mild coarse BS  GI: abdomen soft and nontender  EXT: No pedal  edema, Right hip staples in placed, site looks clean, no  redness  CNS: intubated/sedated              ALLERGIES: NKDA                  LABS:                          11.3   22.5  )-----------( 167      ( 01 Dec 2017 06:38 )             36.8                               11.4   17.4  )-----------( 196      ( 2017 07:15 )             37.6                               11.6   18.5  )-----------( 206      ( 2017 13:39 )             38.3           12    146<H>  |  113<H>  |  18  ----------------------------<  87  3.7   |  27  |  0.22<L>    Ca    8.2<L>      01 Dec 2017 06:38  Phos  1.7     12  Mg     1.9           TPro  4.8<L>  /  Alb  1.7<L>  /  TBili  0.8  /  DBili  x   /  AST  39  /  ALT  29  /  AlkPhos  119  11-29        PT/INR - ( 2017 11:02 )   PT: 17.7 sec;   INR: 1.61 ratio         PTT - ( 2017 11:02 )  PTT:41.2 sec  Urinalysis Basic - ( 2017 16:48 )    Color: Yellow / Appearance: Slightly Turbid / S.020 / pH: x  Gluc: x / Ketone: Small  / Bili: Negative / Urobili: 4   Blood: x / Protein: 100 / Nitrite: Negative   Leuk Esterase: Negative / RBC: 5-10 /HPF / WBC 3-5 /HPF   Sq Epi: x / Non Sq Epi: Moderate /HPF / Bacteria: Many /HPF              ABG - ( 2017 05:30 )  pH: 7.38  /  pCO2: 43    /  pO2: 99    / HCO3: 24    / Base Excess: -0.3  /  SaO2: 98              MEDICATIONS  (STANDING):  ascorbic acid 500 milliGRAM(s) Oral daily  aspirin  chewable 81 milliGRAM(s) Oral daily  atorvastatin 40 milliGRAM(s) Oral at bedtime  dexmedetomidine Infusion 0.2 MICROgram(s)/kG/Hr (3 mL/Hr) IV Continuous <Continuous>  ergocalciferol 00903 Unit(s) Oral <User Schedule>  fentaNYL   Infusion 0.5 MICROgram(s)/kG/Hr (2.335 mL/Hr) IV Continuous <Continuous>  ferrous    sulfate Liquid 300 milliGRAM(s) Enteral Tube daily  folic acid 1 milliGRAM(s) Oral daily  heparin  Injectable 5000 Unit(s) SubCutaneous every 8 hours  lactated ringers. 1000 milliLiter(s) (100 mL/Hr) IV Continuous <Continuous>  multivitamin 1 Tablet(s) Oral daily  norepinephrine Infusion 0.5 MICROgram(s)/kG/Min (21.891 mL/Hr) IV Continuous <Continuous>  pantoprazole  Injectable 40 milliGRAM(s) IV Push daily  piperacillin/tazobactam IVPB. 3.375 Gram(s) IV Intermittent every 8 hours  propylthiouracil 50 milliGRAM(s) Oral daily    MEDICATIONS  (PRN):  acetaminophen  Suppository 650 milliGRAM(s) Rectal every 6 hours PRN For Temp greater than 38 C (100.4 F)                    RADIOLOGY & ADDITIONAL TESTS:      17 : Xray Chest 1 View AP- PORTABLE-Urgent (17 @ 11:51) : Enteric tube has been advanced, below the left hemidiaphragm, tip collimated off the field-of-view. ET tube and left IJ catheter remain in  place. No pneumothorax. Persistent right midlung opacity. Mild diffuse interstitial prominence.  Small bilateral pleural effusions.          17 : Xray Chest 1 View AP/PA (17 @ 05:48) : Increased lung volumes consistent with COPD. Bones are again quite  demineralized. Endotracheal tube is in good position. Nasogastric tube courses into the abdominal area off the lower edge of the film. Heart is grossly normal in size.  Significant infiltrates are seen again bilaterally but most pronounced in the right perihilar area.      17 : Xray Chest 1 View AP -PORTABLE-Routine (17 @ 16:28) : Patchy right perihilar opacities, which may be due to pneumonia. Mild  diffuse interstitial prominence. Small bilateral pleural effusions.            MICROBIOLOGY DATA:      Culture - Sputum . (17 @ 11:55)    Gram Stain:   Moderate polymorphonuclear leukocytes per low power field  Rare Squamous epithelial cells per low power field  Rare Gram Positive Cocci per oil power field    Specimen Source: .Sputum Sputum - trap    Culture Results:   No growth      Culture - Blood (17 @ 10:13)    Specimen Source: .Blood Blood-Peripheral    Culture Results:   No growth to date.    Culture - Blood (17 @ 10:13)    Specimen Source: .Blood Blood-Peripheral    Culture Results:   No growth to date.        Legionella pneumophila Antigen, Urine (17 @ 17:17)    Legionella Antigen, Urine: Negative          Culture - Blood (17 @ 00:47)    -  Haemophilus influenzae: Detec    Gram Stain:   Growth in anaerobic bottle:  Gram Negative Coccobacilli    Specimen Source: .Blood Blood-Venous    Organism: Blood Culture PCR    Culture Results:   Growth in anaerobic bottle:  Gram Negative Coccobacilli  ***Blood Panel PCR results on this specimen are available  approximately 3 hours after the Gram stain result.***  Gram stain, PCR, and/or culture results may not always  correspond due to difference in methodologies.  ************************************************************  This PCR assay was performed using [employer].  The following targets are tested for: Enterococcus,  vancomycin resistant enterococci, Listeria monocytogenes,  coagulase negative staphylococci, S. aureus,  methicillin resistant S. aureus, Streptococcus agalactiae  (Group B), S. pneumoniae, S. pyogenes (Group A),  Acinetobacter baumannii, Enterobacter cloacae, E. coli,  Klebsiella oxytoca, K. pneumoniae,Proteus sp.,  Serratia marcescens, Haemophilus influenzae,  Neisseria meningitidis, Pseudomonas aeruginosa, Candida  albicans, C. glabrata, C krusei, C parapsilosis,  C. tropicalis and the KPC resistance gene.    Organism Identification: Blood Culture PCR    Method Type: PCR          Culture - Blood (17 @ 00:47)    Specimen Source: .Blood Blood-Peripheral    Culture Results:   No growth to date.      Culture - Urine (17 @ 00:19)    Specimen Source: .Urine Catheterized    Culture Results:   No growth            Rapid Respiratory Viral Panel (17 @ 19:15)    Rapid RVP Result: Detected: The [employer] RVP Rapid uses polymerase chain reaction (PCR) and melt  curve analysis to screen for adenovirus; coronavirus HKU1, NL63, 229E,  OC43; human metapneumovirus (hMPV); human enterovirus/rhinovirus  (Entero/RV); influenza A; influenza A/H1;influenza A/H3; influenza  A/H1-2009; influenza B; parainfluenza viruses 1, 2, 3, 4; respiratory  syncytial virus; Bordetella pertussis; Mycoplasma pneumoniae; and  Chlamydophila pneumoniae.

## 2017-12-01 NOTE — CHART NOTE - NSCHARTNOTEFT_GEN_A_CORE
Spoke with Dr. Dinh on the phone - confirms patient does not have an active surrogate for medical decision making. Agreeable with 2-PC DNR. Dr. Soni and Dr. Shahid agreeable - DNR form completed and placed in chart.

## 2017-12-01 NOTE — CONSULT NOTE ADULT - PROBLEM SELECTOR RECOMMENDATION 3
Secondary to dementia. Per NH report, patient nonambulatory, assist x 1 with ADLs, wheel chair bound, incontinent. Patient requires 24 hr care.

## 2017-12-01 NOTE — PROGRESS NOTE ADULT - PROBLEM SELECTOR PLAN 1
2/2 RLL pneumonia, found to be bacteremic 2/2 H. influenzae (gram neg.)  no leucocytosis, afebrile overnight Tmax 100.2  c/w LR @ 100 cc  c/w fentanyl, precedex , titrate for RASS   c/w phenylephrine , titrate for MAP of 65  lactate trended down to 1.7 on 11/30  RVP positive  legionella - negative, d/c'ed levaquin  BCx positive for gram negative coccobacillus - Haemophilus influenzae, c/w zosyn per ID recommendation, awaiting sensitivity  Ucx negative to date  CVP low, gave another 2-3 L bolus of lactate due to low CVP of 8  c/w LR @100ml/hr  titrating to CVP of 12-14

## 2017-12-01 NOTE — PROGRESS NOTE ADULT - ASSESSMENT
A 88 yo Female with right femur neck fx, difficulty walking,  brought in to ER by EMS from [facility] for  evaluation of  difficulty of breathing. In the ER, she found to be tachycardic, tachypneic up to 50's, hypotensive to SBP of 50's and lethargic and required intubation. She also has leukocytosis and chest xray shows right sided pneumonia. She has started on Levaquin, Vancomycin and zosyn, cultures pending. The ID consult requested to assist with further evaluation and antibiotic  management.       # Septic shock- on pressor  # Right sided pneumonia  # RSV  # Bacteremia-  Haemophilus influenzae      Would recommend:  1. Monitor WBC count. is trending back up  2. Follow up sensitivity of  Haemophilus influenzae  3. Continue  Zosyn  4. Management of vent. as per ICU protocol  5. Titrate down pressor as tolerated  6. Supportive care for RSV and Isolation as per Hospital policy      d/w ICU team    will follow the patient with you

## 2017-12-01 NOTE — PROGRESS NOTE ADULT - SUBJECTIVE AND OBJECTIVE BOX
· Subjective and Objective: 	  INTERVAL /OVERNIGHT EVENTS: no events overnight    PRESSORS: [x ] YES [ ] NO  WHICH: phenylephrine, norepinephrine    ANTIBIOTICS:    zosyn              DATE STARTED: 11/28      Antimicrobial:  piperacillin/tazobactam IVPB. 3.375 Gram(s) IV Intermittent every 8 hours    Cardiovascular:  norepinephrine Infusion 0.5 MICROgram(s)/kG/Min IV Continuous <Continuous>  phenylephrine    Infusion 0.5 MICROgram(s)/kG/Min IV Continuous <Continuous>    Pulmonary: N/A    Hematalogic:  aspirin  chewable 81 milliGRAM(s) Oral daily  heparin  Injectable 5000 Unit(s) SubCutaneous every 8 hours    Other:  acetaminophen  Suppository 650 milliGRAM(s) Rectal every 6 hours PRN  ascorbic acid 500 milliGRAM(s) Oral daily  atorvastatin 40 milliGRAM(s) Oral at bedtime  dexmedetomidine Infusion 0.2 MICROgram(s)/kG/Hr IV Continuous <Continuous>  ergocalciferol 35794 Unit(s) Oral <User Schedule>  fentaNYL   Infusion 0.5 MICROgram(s)/kG/Hr IV Continuous <Continuous>  ferrous    sulfate Liquid 300 milliGRAM(s) Enteral Tube daily  folic acid 1 milliGRAM(s) Oral daily  lactated ringers. 1000 milliLiter(s) IV Continuous <Continuous>  multivitamin 1 Tablet(s) Oral daily  pantoprazole  Injectable 40 milliGRAM(s) IV Push daily  propylthiouracil 50 milliGRAM(s) Oral daily    acetaminophen  Suppository 650 milliGRAM(s) Rectal every 6 hours PRN  ascorbic acid 500 milliGRAM(s) Oral daily  aspirin  chewable 81 milliGRAM(s) Oral daily  atorvastatin 40 milliGRAM(s) Oral at bedtime  dexmedetomidine Infusion 0.2 MICROgram(s)/kG/Hr IV Continuous <Continuous>  ergocalciferol 63389 Unit(s) Oral <User Schedule>  fentaNYL   Infusion 0.5 MICROgram(s)/kG/Hr IV Continuous <Continuous>  ferrous    sulfate Liquid 300 milliGRAM(s) Enteral Tube daily  folic acid 1 milliGRAM(s) Oral daily  heparin  Injectable 5000 Unit(s) SubCutaneous every 8 hours  lactated ringers. 1000 milliLiter(s) IV Continuous <Continuous>  multivitamin 1 Tablet(s) Oral daily  norepinephrine Infusion 0.5 MICROgram(s)/kG/Min IV Continuous <Continuous>  pantoprazole  Injectable 40 milliGRAM(s) IV Push daily  phenylephrine    Infusion 0.5 MICROgram(s)/kG/Min IV Continuous <Continuous>  piperacillin/tazobactam IVPB. 3.375 Gram(s) IV Intermittent every 8 hours  propylthiouracil 50 milliGRAM(s) Oral daily    Drug Dosing Weight  Height (cm): 144.78 (28 Nov 2017 18:44)  Weight (kg): 46.7 (28 Nov 2017 18:44)  BMI (kg/m2): 22.3 (28 Nov 2017 18:44)  BSA (m2): 1.36 (28 Nov 2017 18:44)    CENTRAL LINE: [x ] YES [ ] NO  LOCATION:  [facility] DATE INSERTED: 11/28  REMOVE: [ ] YES [ x] NO  EXPLAIN: intact line, within 7 days of insertion    MORALES: [x ] YES [ ] NO    DATE INSERTED: 11/28  REMOVE:  [ ] YES [ x] NO  EXPLAIN: withn 7 days of insertion, no sedimentation    A-LINE:  [ ] YES [x ] NO        ICU Vital Signs Last 24 Hrs  T(C): 37.2 (01 Dec 2017 10:00), Max: 37.3 (01 Dec 2017 05:00)  T(F): 98.9 (01 Dec 2017 10:00), Max: 99.2 (01 Dec 2017 05:00)  HR: 72 (01 Dec 2017 13:20) (55 - 80)  BP: 129/45 (01 Dec 2017 11:30) (107/53 - 132/50)  BP(mean): 64 (01 Dec 2017 11:30) (59 - 83)  RR: 16 (01 Dec 2017 11:30) (16 - 22)  SpO2: 100% (01 Dec 2017 13:20) (90% - 100%)      ABG - ( 01 Dec 2017 04:25 )  pH: 7.37  /  pCO2: 47    /  pO2: 106   / HCO3: 27    / Base Excess: 1.7   /  SaO2: 98                    11-30 @ 07:01  -  12-01 @ 07:00  --------------------------------------------------------  IN: 2717.7 mL / OUT: 800 mL / NET: 1917.7 mL        Mode: AC/ CMV (Assist Control/ Continuous Mandatory Ventilation)  RR (machine): 16  TV (machine): 400  FiO2: 50  PEEP: 5  ITime: 1  MAP: 8  PIP: 27      PHYSICAL EXAM:    GENERAL: no acute distress, sedated, intubated  HEAD: atraumatic, normocephalic   EYES: PERRL, white sclera.   ENMT: oral cavity moist, NG tube  NECK: supple,  no JVD  LYMPH: no palpable lymph nodes     SKIN: warm, dry   CHEST/LUNG: ET tube: 23  cm at lip. No Chest deformity , no chest tenderness. bilateral breath sounds, no  adventitious sounds  HEART: RRR, no m/r/g   ABDOMEN: soft, nontender, nondistended; bowel sounds.  :morales catheter.  EXTREMITIES: no edema, no cyanosis, no clubbing; hypertrophic nails  NEURO: sedated, nonverbal, noncommunicative        LABS:  CBC Full  -  ( 01 Dec 2017 06:38 )  WBC Count : 22.5 K/uL  Hemoglobin : 11.3 g/dL  Hematocrit : 36.8 %  Platelet Count - Automated : 167 K/uL  Mean Cell Volume : 103.9 fl  Mean Cell Hemoglobin : 31.8 pg  Mean Cell Hemoglobin Concentration : 30.6 gm/dL  Auto Neutrophil # : x  Auto Lymphocyte # : x  Auto Monocyte # : x  Auto Eosinophil # : x  Auto Basophil # : x  Auto Neutrophil % : x  Auto Lymphocyte % : x  Auto Monocyte % : x  Auto Eosinophil % : x  Auto Basophil % : x    12-01    146<H>  |  113<H>  |  18  ----------------------------<  87  3.7   |  27  |  0.22<L>    Ca    8.2<L>      01 Dec 2017 06:38  Phos  1.7     12-01  Mg     1.9     12-01          Culture Results:   No growth (11-30 @ 11:55)  Culture Results:   No growth to date. (11-30 @ 10:13)  Culture Results:   No growth to date. (11-30 @ 10:13)  Culture Results:   No growth to date. (11-29 @ 00:47)  Culture Results:   Growth in anaerobic bottle: Haemophilus influenzae  ***Blood Panel PCR results on this specimen are available  approximately 3 hours after the Gram stain result.***  Gram stain, PCR, and/or culture results may not always  correspond due to difference in methodologies.  ************************************************************  This PCR assay was performed using [employer].  The following targets are tested for: Enterococcus,  vancomycin resistant enterococci, Listeria monocytogenes,  coagulase negative staphylococci, S. aureus,  methicillin resistant S. aureus, Streptococcus agalactiae  (Group B), S. pneumoniae, S. pyogenes (Group A),  Acinetobacter baumannii, Enterobacter cloacae, E. coli,  Klebsiella oxytoca, K. pneumoniae, Proteus sp.,  Serratia marcescens, Haemophilus influenzae,  Neisseria meningitidis, Pseudomonas aeruginosa, Candida  albicans, C. glabrata, C krusei, C parapsilosis,  C. tropicalis and the KPC resistance gene. (11-29 @ 00:47)  Culture Results:   No growth (11-29 @ 00:19)      RADIOLOGY & ADDITIONAL STUDIES REVIEWED:     [ ]GOALS OF CARE DISCUSSION WITH PATIENT/FAMILY/PROXY:    CRITICAL CARE TIME SPENT: 35 minutes    Assessment and Plan:   · Assessment		  Patient was intubated in the ED, tachycardic to 110-130's in the ED, BP improved to 156/72 after starting levophed,  No leucocytosis, but has a left shift, BMP shows hypernatremia to 151, elevated BUN to 45, lactate is 3.6. MCV elevated to 104.1.   ABG on s/p intubation on FiO2 of 100%, TV: 400, PEEP of 5 is pH: 7.22, PaCO2: 66, PaO2: 95, Bicarb: 26, O2 Sat of 100%  s/p 2L NS bolus and vanc and zosyn in the ED  CXR: Patchy right perihilar opacities, which may be due to pneumonia. Mild diffuse interstitial prominence. Small bilateral pleural effusions.  EKG - LVH , LAD , incomplete LBBB, QRS     Admitted to ICU for hypoxic and hypercapnia respiratory failure and septic shock requiring pressors 2/2 PNA found to be bacteremic with H. influenza           Problem/Plan - 1:  ·  Problem: Septic shock.  Plan: 2/2 RLL pneumonia, found to be bacteremic 2/2 H. influenzae (gram neg.)  no leucocytosis, afebrile overnight Tmax 100.2  c/w LR @ 100 cc  c/w fentanyl, precedex , titrate for RASS   c/w phenylephrine , titrate for MAP of 65  lactate trended down to 1.7 on 11/30  RVP positive  legionella - negative, d/c'ed levaquin  BCx positive for gram negative coccobacillus - Haemophilus influenzae, c/w zosyn per ID recommendation, awaiting sensitivity  Ucx negative to date  CVP low, gave another 2-3 L bolus of lactate due to low CVP of 8  c/w LR @100ml/hr  titrating to CVP of 12-14.      Problem/Plan - 2:  ·  Problem: Respiratory failure.  Plan: hypoxic and hypercapnia respiratory failure 2/2 RLL pneumonia, found to be bacteremic with H. influenza, RVP positive  BG on s/p intubation on FiO2 of 100%, TV: 400, PEEP of 5 is   pH: 7.22, PaCO2: 66, PaO2: 95, Bicarb: 26, O2 Sat of 100%  c/w sedation , titrate for RASS score of -2  c/w levophed , titrate for MAP of 65.      Problem/Plan - 3:  ·  Problem: EKG abnormalities.  Plan: EKG - LVH , LAD , incomplete LBBB, QRS   T1 T2 elevated at 1.5, trended down T3 to 1.03  monitor on tele  c/w aspirin , statin through NG tube   no BB due to hypotension  pt had acute onset of Afib-given one dose amiodarone and converted back to sinus rhythm  f/u repeat echo  f/u cardio consult = Dr. Garrison.      Problem/Plan - 4:  ·  Problem: Hypernatremia.  Plan: Na 150  s/p 1 bolus LR  300ml FW q6.      Problem/Plan - 5:  ·  Problem: Prophylactic measure.  Plan: c/w GI ppx and DVT as ordered  IMPROVE - 2.

## 2017-12-01 NOTE — CONSULT NOTE ADULT - PROBLEM SELECTOR RECOMMENDATION 9
2/2 RLL pneumonia, likely gram negative organism due to likely aspiration. Pt remains sedated/intubated, on pressor. Full code on file.

## 2017-12-01 NOTE — PROGRESS NOTE ADULT - PROBLEM SELECTOR PLAN 2
hypoxic and hypercapnia respiratory failure 2/2 RLL pneumonia, found to be bacteremic with H. influenza, RVP positive  BG on s/p intubation on FiO2 of 100%, TV: 400, PEEP of 5 is   pH: 7.22, PaCO2: 66, PaO2: 95, Bicarb: 26, O2 Sat of 100%  c/w sedation , titrate for RASS score of -2  c/w levophed , titrate for MAP of 65

## 2017-12-01 NOTE — CONSULT NOTE ADULT - SUBJECTIVE AND OBJECTIVE BOX
HPI:  Patient is a 89/F from [facility], with PMHx of right femur neck fracture, iron deficiency anemia, Hyperthyroidism ( patient on PTU at home) constipation, difficulty walking, was BIB EMS from NH for c/o difficulty breathing. As per ED attending, patient was tachycardic, tachypneic up to 50's, hypotensive to SBP of 50's and lethargic and needed to be intubated in the ER. Patient is Full Code as per NH papers.  In ED pt has CXR showing   Social Hx: cannot obtain at this time.   Surgical Hx and Fhx cannot be obtained at this time  Next of Kin as per NH papers: Mamie Hagan, Daughter, unable to get contact number of pt's daughter, full code (28 Nov 2017 17:02)      PAST MEDICAL & SURGICAL HISTORY:  Psoriasis  Dementia  No significant past surgical history      SOCIAL HISTORY:    Admitted from:  home assisted living [facility]   Substance abuse history:              Tobacco hx:                  Alcohol hx:              Home Opioid hx:  Moravian:                                    Preferred Language:    Surrogate/HCP/Guardian:            Phone#:    FAMILY HISTORY:    Baseline ADLs (prior to admission):    Allergies    No Known Allergies    Intolerances      Present Symptoms:   Dyspnea:   Nausea/Vomiting:   Anxiety:  Depressed   Fatigue:  Loss of appetite:   Pain:                                location:          Review of Systems: [All others negative or Unable to obtain due to poor mentation]    MEDICATIONS  (STANDING):  ascorbic acid 500 milliGRAM(s) Oral daily  aspirin  chewable 81 milliGRAM(s) Oral daily  atorvastatin 40 milliGRAM(s) Oral at bedtime  dexmedetomidine Infusion 0.2 MICROgram(s)/kG/Hr (3 mL/Hr) IV Continuous <Continuous>  ergocalciferol 78624 Unit(s) Oral <User Schedule>  fentaNYL   Infusion 0.5 MICROgram(s)/kG/Hr (2.335 mL/Hr) IV Continuous <Continuous>  ferrous    sulfate Liquid 300 milliGRAM(s) Enteral Tube daily  folic acid 1 milliGRAM(s) Oral daily  heparin  Injectable 5000 Unit(s) SubCutaneous every 8 hours  lactated ringers. 1000 milliLiter(s) (100 mL/Hr) IV Continuous <Continuous>  multivitamin 1 Tablet(s) Oral daily  norepinephrine Infusion 0.5 MICROgram(s)/kG/Min (21.891 mL/Hr) IV Continuous <Continuous>  pantoprazole  Injectable 40 milliGRAM(s) IV Push daily  phenylephrine    Infusion 0.5 MICROgram(s)/kG/Min (5.625 mL/Hr) IV Continuous <Continuous>  piperacillin/tazobactam IVPB. 3.375 Gram(s) IV Intermittent every 8 hours  potassium phosphate IVPB 15 milliMole(s) IV Intermittent once  propylthiouracil 50 milliGRAM(s) Oral daily    MEDICATIONS  (PRN):  acetaminophen  Suppository 650 milliGRAM(s) Rectal every 6 hours PRN For Temp greater than 38 C (100.4 F)      PHYSICAL EXAM:    Vital Signs Last 24 Hrs  T(C): 37.2 (01 Dec 2017 10:00), Max: 37.3 (01 Dec 2017 05:00)  T(F): 98.9 (01 Dec 2017 10:00), Max: 99.2 (01 Dec 2017 05:00)  HR: 68 (01 Dec 2017 11:30) (55 - 80)  BP: 129/45 (01 Dec 2017 11:30) (107/53 - 132/50)  BP(mean): 64 (01 Dec 2017 11:30) (59 - 83)  RR: 16 (01 Dec 2017 11:30) (16 - 22)  SpO2: 100% (01 Dec 2017 11:30) (90% - 100%)    General: alert  oriented x ____    [lethargic distressed cachexia  nonverbal  unarousable verbal]  Karnofsky Performance Score/Palliative Performance Status Version2:     %    HEENT: normal  dry mouth  ET tube/trach oral lesions:  Lungs: comfortable tachypnea/labored breathing  excessive secretions  CV: normal  tachycardia  GI: normal  distended  tender  incontinent               PEG/NG/OG tube  constipation  last BM:   : normal  incontinent  oliguria/anuria  morales  Musculoskeletal: normal  weakness  edema             ambulatory  bedbound/wheelchair bound  Skin: normal  pressure ulcers: stage: edema: other:  Neuro: no deficits cognitive impairment dsyphagia/dysarthria paresis: other:  Oral intake ability: unable/only mouth care [minimal moderate full capability]  Diet: [NPO]    LABS:                        11.3   22.5  )-----------( 167      ( 01 Dec 2017 06:38 )             36.8     12-01    146<H>  |  113<H>  |  18  ----------------------------<  87  3.7   |  27  |  0.22<L>    Ca    8.2<L>      01 Dec 2017 06:38  Phos  1.7     12-01  Mg     1.9     12-01    TPro  4.8<L>  /  Alb  1.7<L>  /  TBili  0.8  /  DBili  x   /  AST  39  /  ALT  29  /  AlkPhos  119  11-29        RADIOLOGY & ADDITIONAL STUDIES:    ADVANCE DIRECTIVES:   Advanced Care Planning discussion total time spent: HPI:  Patient is a 89/F from [facility] was BIB EMS from NH for c/o difficulty breathing. As per ED attending, patient was tachycardic, tachypneic up to 50's, hypotensive to SBP of 50's and lethargic and needed to be intubated in the ER. Admitted to ICU for hypoxic and hypercapnia respiratory failure and septic shock requiring pressors 2/2 PNA found to be bacteremic with H. influenza. Full code on file - no surrogate/RP identified per NH report.     PAST MEDICAL & SURGICAL HISTORY:  Psoriasis  Dementia  No significant past surgical history    SOCIAL HISTORY:    Admitted from: [facility] Opioid hx:  Preferred Language:    Surrogate/HCP/Guardian: Per NH, they have no contact information for her daughter, Mamie. They report no one has been involved in her care or identified as a surrogate/RP.  They recommend [facility] medical decision making.               Baseline ADLs (prior to admission): assist x 1 with all ADLs, nonambulatory, wheel chair bound, incontinent, + dementia    No Known Allergies    Present Symptoms:   Review of Systems: Patient intubated - unable to obtain     MEDICATIONS  (STANDING):  ascorbic acid 500 milliGRAM(s) Oral daily  aspirin  chewable 81 milliGRAM(s) Oral daily  atorvastatin 40 milliGRAM(s) Oral at bedtime  dexmedetomidine Infusion 0.2 MICROgram(s)/kG/Hr (3 mL/Hr) IV Continuous <Continuous>  ergocalciferol 55662 Unit(s) Oral <User Schedule>  fentaNYL   Infusion 0.5 MICROgram(s)/kG/Hr (2.335 mL/Hr) IV Continuous <Continuous>  ferrous    sulfate Liquid 300 milliGRAM(s) Enteral Tube daily  folic acid 1 milliGRAM(s) Oral daily  heparin  Injectable 5000 Unit(s) SubCutaneous every 8 hours  lactated ringers. 1000 milliLiter(s) (100 mL/Hr) IV Continuous <Continuous>  multivitamin 1 Tablet(s) Oral daily  norepinephrine Infusion 0.5 MICROgram(s)/kG/Min (21.891 mL/Hr) IV Continuous <Continuous>  pantoprazole  Injectable 40 milliGRAM(s) IV Push daily  phenylephrine    Infusion 0.5 MICROgram(s)/kG/Min (5.625 mL/Hr) IV Continuous <Continuous>  piperacillin/tazobactam IVPB. 3.375 Gram(s) IV Intermittent every 8 hours  potassium phosphate IVPB 15 milliMole(s) IV Intermittent once  propylthiouracil 50 milliGRAM(s) Oral daily    MEDICATIONS  (PRN):  acetaminophen  Suppository 650 milliGRAM(s) Rectal every 6 hours PRN For Temp greater than 38 C (100.4 F)      PHYSICAL EXAM:    Vital Signs Last 24 Hrs  T(C): 37.2 (01 Dec 2017 10:00), Max: 37.3 (01 Dec 2017 05:00)  T(F): 98.9 (01 Dec 2017 10:00), Max: 99.2 (01 Dec 2017 05:00)  HR: 68 (01 Dec 2017 11:30) (55 - 80)  BP: 129/45 (01 Dec 2017 11:30) (107/53 - 132/50)  BP(mean): 64 (01 Dec 2017 11:30) (59 - 83)  RR: 16 (01 Dec 2017 11:30) (16 - 22)  SpO2: 100% (01 Dec 2017 11:30) (90% - 100%)    General: pt sedated and intubated, nonverbal.   Karnofsky Performance Score/Palliative Performance Status Version2:     20%    HEENT:  ET tube, NGT   Lungs: pt intubated  CV: normal    GI:   incontinent, NGT  :  morales  Musculoskeletal: nonambulatory/assist x 1 with ADLs at baseline. Pt now bedbound   Skin: sacral suspected DTI  Neuro:  cognitive impairment at baseline, pt sedated/intubated - unable to follow commands, not alert  Oral intake ability: unable/only mouth care   Diet: NPO-NGT    LABS:                        11.3   22.5  )-----------( 167      ( 01 Dec 2017 06:38 )             36.8     12-01    146<H>  |  113<H>  |  18  ----------------------------<  87  3.7   |  27  |  0.22<L>    Ca    8.2<L>      01 Dec 2017 06:38  Phos  1.7     12-01  Mg     1.9     12-01    TPro  4.8<L>  /  Alb  1.7<L>  /  TBili  0.8  /  DBili  x   /  AST  39  /  ALT  29  /  AlkPhos  119  11-29        RADIOLOGY & ADDITIONAL STUDIES:  < from: Xray Chest 1 View AP- PORTABLE-Urgent (12.01.17 @ 11:51) >  EXAM:  CHEST-PORTABLE URGENT                            PROCEDURE DATE:  12/01/2017          INTERPRETATION:  PORTABLE CHEST X-RAY    HISTORY: ngt replacement.    COMPARISON: 12/1/2017.    FINDINGS/  IMPRESSION:  Enteric tube has been advanced, below the left hemidiaphragm, tip   collimated off the field-of-view. ET tube and left IJ catheter remain in   place. No pneumothorax.    Persistent right midlung opacity. Mild diffuse interstitial prominence.   Small bilateral pleural effusions.    The cardiac silhouette is in normal limits in size.    < end of copied text >      ADVANCE DIRECTIVES: full code

## 2017-12-01 NOTE — CONSULT NOTE ADULT - PROBLEM SELECTOR RECOMMENDATION 4
Spoke with [facility] RN and  department. Per NH, they have no contact information for her daughter, Mamie. They report patient has a daughter, Mamie, but no contact information is available. They report no one has been involved in patient's care or identified as an active surrogate or RP. During patient's previous hospitalization, 2 PC treatment was provided. NH recommends 2 PC medical decision making. Patient remains a full code at this time. Spoke with [facility] RN and  department. Per NH, they have no contact information for her daughter, Mamie. They report patient has a daughter, Mamie, but no contact information is available. They report no one has been involved in patient's care or identified as an active surrogate or RP. During patient's previous hospitalization, 2 PC treatment was provided. NH recommends 2 PC medical decision making. Recommendation: DNR, no reintubation. Patient remains a full code at this time.

## 2017-12-01 NOTE — PROGRESS NOTE ADULT - SUBJECTIVE AND OBJECTIVE BOX
INTERVAL /OVERNIGHT EVENTS: no events overnight    PRESSORS: [x ] YES [ ] NO  WHICH: phenylephrine, norepinephrine    ANTIBIOTICS:    zosyn              DATE STARTED: 11/28      Antimicrobial:  piperacillin/tazobactam IVPB. 3.375 Gram(s) IV Intermittent every 8 hours    Cardiovascular:  norepinephrine Infusion 0.5 MICROgram(s)/kG/Min IV Continuous <Continuous>  phenylephrine    Infusion 0.5 MICROgram(s)/kG/Min IV Continuous <Continuous>    Pulmonary: N/A    Hematalogic:  aspirin  chewable 81 milliGRAM(s) Oral daily  heparin  Injectable 5000 Unit(s) SubCutaneous every 8 hours    Other:  acetaminophen  Suppository 650 milliGRAM(s) Rectal every 6 hours PRN  ascorbic acid 500 milliGRAM(s) Oral daily  atorvastatin 40 milliGRAM(s) Oral at bedtime  dexmedetomidine Infusion 0.2 MICROgram(s)/kG/Hr IV Continuous <Continuous>  ergocalciferol 50827 Unit(s) Oral <User Schedule>  fentaNYL   Infusion 0.5 MICROgram(s)/kG/Hr IV Continuous <Continuous>  ferrous    sulfate Liquid 300 milliGRAM(s) Enteral Tube daily  folic acid 1 milliGRAM(s) Oral daily  lactated ringers. 1000 milliLiter(s) IV Continuous <Continuous>  multivitamin 1 Tablet(s) Oral daily  pantoprazole  Injectable 40 milliGRAM(s) IV Push daily  propylthiouracil 50 milliGRAM(s) Oral daily    acetaminophen  Suppository 650 milliGRAM(s) Rectal every 6 hours PRN  ascorbic acid 500 milliGRAM(s) Oral daily  aspirin  chewable 81 milliGRAM(s) Oral daily  atorvastatin 40 milliGRAM(s) Oral at bedtime  dexmedetomidine Infusion 0.2 MICROgram(s)/kG/Hr IV Continuous <Continuous>  ergocalciferol 12755 Unit(s) Oral <User Schedule>  fentaNYL   Infusion 0.5 MICROgram(s)/kG/Hr IV Continuous <Continuous>  ferrous    sulfate Liquid 300 milliGRAM(s) Enteral Tube daily  folic acid 1 milliGRAM(s) Oral daily  heparin  Injectable 5000 Unit(s) SubCutaneous every 8 hours  lactated ringers. 1000 milliLiter(s) IV Continuous <Continuous>  multivitamin 1 Tablet(s) Oral daily  norepinephrine Infusion 0.5 MICROgram(s)/kG/Min IV Continuous <Continuous>  pantoprazole  Injectable 40 milliGRAM(s) IV Push daily  phenylephrine    Infusion 0.5 MICROgram(s)/kG/Min IV Continuous <Continuous>  piperacillin/tazobactam IVPB. 3.375 Gram(s) IV Intermittent every 8 hours  propylthiouracil 50 milliGRAM(s) Oral daily    Drug Dosing Weight  Height (cm): 144.78 (28 Nov 2017 18:44)  Weight (kg): 46.7 (28 Nov 2017 18:44)  BMI (kg/m2): 22.3 (28 Nov 2017 18:44)  BSA (m2): 1.36 (28 Nov 2017 18:44)    CENTRAL LINE: [x ] YES [ ] NO  LOCATION:  [facility] DATE INSERTED: 11/28  REMOVE: [ ] YES [ x] NO  EXPLAIN: intact line, within 7 days of insertion    MORALES: [x ] YES [ ] NO    DATE INSERTED: 11/28  REMOVE:  [ ] YES [ x] NO  EXPLAIN: withn 7 days of insertion, no sedimentation    A-LINE:  [ ] YES [x ] NO        ICU Vital Signs Last 24 Hrs  T(C): 37.2 (01 Dec 2017 10:00), Max: 37.3 (01 Dec 2017 05:00)  T(F): 98.9 (01 Dec 2017 10:00), Max: 99.2 (01 Dec 2017 05:00)  HR: 72 (01 Dec 2017 13:20) (55 - 80)  BP: 129/45 (01 Dec 2017 11:30) (107/53 - 132/50)  BP(mean): 64 (01 Dec 2017 11:30) (59 - 83)  RR: 16 (01 Dec 2017 11:30) (16 - 22)  SpO2: 100% (01 Dec 2017 13:20) (90% - 100%)      ABG - ( 01 Dec 2017 04:25 )  pH: 7.37  /  pCO2: 47    /  pO2: 106   / HCO3: 27    / Base Excess: 1.7   /  SaO2: 98                    11-30 @ 07:01  -  12-01 @ 07:00  --------------------------------------------------------  IN: 2717.7 mL / OUT: 800 mL / NET: 1917.7 mL        Mode: AC/ CMV (Assist Control/ Continuous Mandatory Ventilation)  RR (machine): 16  TV (machine): 400  FiO2: 50  PEEP: 5  ITime: 1  MAP: 8  PIP: 27      PHYSICAL EXAM:    GENERAL: no acute distress, sedated, intubated  HEAD: atraumatic, normocephalic   EYES: PERRL, white sclera.   ENMT: oral cavity moist, NG tube  NECK: supple,  no JVD  LYMPH: no palpable lymph nodes     SKIN: warm, dry   CHEST/LUNG: ET tube: 23  cm at lip. No Chest deformity , no chest tenderness. bilateral breath sounds, no  adventitious sounds  HEART: RRR, no m/r/g   ABDOMEN: soft, nontender, nondistended; bowel sounds.  :morales catheter.  EXTREMITIES: no edema, no cyanosis, no clubbing; hypertrophic nails  NEURO: sedated, nonverbal, noncommunicative        LABS:  CBC Full  -  ( 01 Dec 2017 06:38 )  WBC Count : 22.5 K/uL  Hemoglobin : 11.3 g/dL  Hematocrit : 36.8 %  Platelet Count - Automated : 167 K/uL  Mean Cell Volume : 103.9 fl  Mean Cell Hemoglobin : 31.8 pg  Mean Cell Hemoglobin Concentration : 30.6 gm/dL  Auto Neutrophil # : x  Auto Lymphocyte # : x  Auto Monocyte # : x  Auto Eosinophil # : x  Auto Basophil # : x  Auto Neutrophil % : x  Auto Lymphocyte % : x  Auto Monocyte % : x  Auto Eosinophil % : x  Auto Basophil % : x    12-01    146<H>  |  113<H>  |  18  ----------------------------<  87  3.7   |  27  |  0.22<L>    Ca    8.2<L>      01 Dec 2017 06:38  Phos  1.7     12-01  Mg     1.9     12-01          Culture Results:   No growth (11-30 @ 11:55)  Culture Results:   No growth to date. (11-30 @ 10:13)  Culture Results:   No growth to date. (11-30 @ 10:13)  Culture Results:   No growth to date. (11-29 @ 00:47)  Culture Results:   Growth in anaerobic bottle: Haemophilus influenzae  ***Blood Panel PCR results on this specimen are available  approximately 3 hours after the Gram stain result.***  Gram stain, PCR, and/or culture results may not always  correspond due to difference in methodologies.  ************************************************************  This PCR assay was performed using [place] [facility].  The following targets are tested for: Enterococcus,  vancomycin resistant enterococci, Listeria monocytogenes,  coagulase negative staphylococci, S. aureus,  methicillin resistant S. aureus, Streptococcus agalactiae  (Group B), S. pneumoniae, S. pyogenes (Group A),  Acinetobacter baumannii, Enterobacter cloacae, E. coli,  Klebsiella oxytoca, K. pneumoniae, Proteus sp.,  Serratia marcescens, Haemophilus influenzae,  Neisseria meningitidis, Pseudomonas aeruginosa, Candida  albicans, C. glabrata, C krusei, C parapsilosis,  C. tropicalis and the KPC resistance gene. (11-29 @ 00:47)  Culture Results:   No growth (11-29 @ 00:19)      RADIOLOGY & ADDITIONAL STUDIES REVIEWED:     [ ]GOALS OF CARE DISCUSSION WITH PATIENT/FAMILY/PROXY:    CRITICAL CARE TIME SPENT: 35 minutes

## 2017-12-02 NOTE — PROGRESS NOTE ADULT - PROBLEM SELECTOR PLAN 1
2/2 RLL pneumonia, found to be bacteremic 2/2 H. influenzae (gram neg.)  no leucocytosis, afebrile overnight Tmax 100.2  c/w fentanyl, precedex , titrate for RASS   c/w phenylephrine , titrate for MAP of 65  lactate trended down to 1.7 on 11/30  RVP positive  legionella - negative, d/c'ed levaquin  BCx positive for gram negative coccobacillus - Haemophilus influenzae, c/w zosyn per ID recommendation, awaiting sensitivity  Ucx negative to date  CVP low 12/1, gave another 2-3 L bolus of lactate due to low CVP of 8- Improved  Will hold off to LR for now 2/2 to elevated CVP  titrating to goal CVP of 12-14 2/2 RLL pneumonia, found to be bacteremic 2/2 H. influenzae (gram neg.)  no leucocytosis, afebrile overnight Tmax 100.2  c/w fentanyl, precedex , titrate for RASS   c/w phenylephrine , titrate for MAP of 65  lactate trended down to 1.7 on 11/30  RVP positive  legionella - negative, d/c'ed levaquin  BCx positive for gram negative coccobacillus - Haemophilus influenzae, c/w zosyn per ID recommendation, awaiting sensitivity  Ucx negative to date  CVP low 12/1, gave another 2-3 L bolus of lactate due to low CVP of 8- Improved  d/c LR, d/c'ed pressors

## 2017-12-02 NOTE — PROGRESS NOTE ADULT - SUBJECTIVE AND OBJECTIVE BOX
I have spoken with [facility] and they have released the sensitivity of Haemophilus Influenza. Therefore, would recommend change Zosyn to Ceftriaxone 2 g q 24hours.           C& S:      Culture - Blood (11.29.17 @ 00:47)    Gram Stain:   Growth in anaerobic bottle:  Gram Negative Coccobacilli    -  Ampicillin: S    -  Ceftriaxone: S    -  Chloramphenicol: S    -  Levofloxacin: S    -  Meropenem: S    -  Haemophilus influenzae: Detec    Specimen Source: .Blood Blood-Venous    Organism: Blood Culture PCR    Organism: Haemophilus influenzae    Culture Results:   Growth in anaerobic bottle: Haemophilus influenzae  ***Blood Panel PCR results on this specimen are available  approximately 3 hours after the Gram stain result.***  Gram stain, PCR, and/or culture results may not always  correspond due to difference in methodologies.  ************************************************************  This PCR assay was performed using [employer].  The following targets are tested for: Enterococcus,  vancomycin resistant enterococci, Listeria monocytogenes,  coagulase negative staphylococci, S. aureus,  methicillin resistant S. aureus, Streptococcus agalactiae  (Group B), S. pneumoniae, S. pyogenes (Group A),  Acinetobacter baumannii, Enterobacter cloacae, E. coli,  Klebsiella oxytoca, K. pneumoniae, Proteus sp.,  Serratia marcescens, Haemophilus influenzae,  Neisseria meningitidis, Pseudomonas aeruginosa, Candida  albicans, C. glabrata, C krusei, C parapsilosis,  C. tropicalis and the KPC resistance gene.    Organism Identification: Blood Culture PCR  Haemophilus influenzae    Method Type: PCR    Method Type: GHAZAL        d/w ICU team

## 2017-12-02 NOTE — PROGRESS NOTE ADULT - PROBLEM SELECTOR PLAN 2
hypoxic and hypercapnia respiratory failure 2/2 RLL pneumonia, found to be bacteremic with H. influenza, RVP positive  BG on s/p intubation on FiO2 of 100%, TV: 400, PEEP of 5 is   pH: 7.22, PaCO2: 66, PaO2: 95, Bicarb: 26, O2 Sat of 100%  c/w sedation , titrate for RASS score of -2  c/w levophed , titrate for MAP of 65 hypoxic and hypercapnia respiratory failure 2/2 RLL pneumonia, found to be bacteremic with H. influenza, RVP positive  s/p intubation  c/w fentanyl 25mcg q4h   c/w precedex

## 2017-12-02 NOTE — ADVANCED PRACTICE NURSE CONSULT - ASSESSMENT
This is a 89yr old female patient admitted for Sepsis, presenting with an intact DTI to the Sacrococcyx Area (7cm x 6cm) without drainage. It is to be noted that the patient has been on a couple of high dose vasopressor medications for an extended period of time. While on those medications, the patient was maintained on a low-air loss mattress, with heel protectors, and with other pressure injury prevention devices.

## 2017-12-02 NOTE — PROGRESS NOTE ADULT - SUBJECTIVE AND OBJECTIVE BOX
INTERVAL /OVERNIGHT EVENTS: CVP elevated (~18-20)- discontinues LR. Switched fentanyl drip to fentanyl 25 IV q8 PRN agitation. No acute events overnight.    PRESSORS: [x ] YES [ ] NO  WHICH: phenylephrine, norepinephrine    ANTIBIOTICS:    zosyn              DATE STARTED: 11/28      Antimicrobial:  piperacillin/tazobactam IVPB. 3.375 Gram(s) IV Intermittent every 8 hours    Cardiovascular:  norepinephrine Infusion 0.5 MICROgram(s)/kG/Min IV Continuous <Continuous>  phenylephrine    Infusion 0.5 MICROgram(s)/kG/Min IV Continuous <Continuous>    Pulmonary: N/A    Hematalogic:  aspirin  chewable 81 milliGRAM(s) Oral daily  heparin  Injectable 5000 Unit(s) SubCutaneous every 8 hours    Other:  acetaminophen  Suppository 650 milliGRAM(s) Rectal every 6 hours PRN  ascorbic acid 500 milliGRAM(s) Oral daily  atorvastatin 40 milliGRAM(s) Oral at bedtime  dexmedetomidine Infusion 0.2 MICROgram(s)/kG/Hr IV Continuous <Continuous>  ergocalciferol 82410 Unit(s) Oral <User Schedule>  fentaNYL   Infusion 0.5 MICROgram(s)/kG/Hr IV Continuous <Continuous>  ferrous    sulfate Liquid 300 milliGRAM(s) Enteral Tube daily  folic acid 1 milliGRAM(s) Oral daily  lactated ringers. 1000 milliLiter(s) IV Continuous <Continuous>  multivitamin 1 Tablet(s) Oral daily  pantoprazole  Injectable 40 milliGRAM(s) IV Push daily  propylthiouracil 50 milliGRAM(s) Oral daily    acetaminophen  Suppository 650 milliGRAM(s) Rectal every 6 hours PRN  ascorbic acid 500 milliGRAM(s) Oral daily  aspirin  chewable 81 milliGRAM(s) Oral daily  atorvastatin 40 milliGRAM(s) Oral at bedtime  dexmedetomidine Infusion 0.2 MICROgram(s)/kG/Hr IV Continuous <Continuous>  ergocalciferol 63420 Unit(s) Oral <User Schedule>  fentaNYL   Infusion 0.5 MICROgram(s)/kG/Hr IV Continuous <Continuous>  ferrous    sulfate Liquid 300 milliGRAM(s) Enteral Tube daily  folic acid 1 milliGRAM(s) Oral daily  heparin  Injectable 5000 Unit(s) SubCutaneous every 8 hours  lactated ringers. 1000 milliLiter(s) IV Continuous <Continuous>  multivitamin 1 Tablet(s) Oral daily  norepinephrine Infusion 0.5 MICROgram(s)/kG/Min IV Continuous <Continuous>  pantoprazole  Injectable 40 milliGRAM(s) IV Push daily  phenylephrine    Infusion 0.5 MICROgram(s)/kG/Min IV Continuous <Continuous>  piperacillin/tazobactam IVPB. 3.375 Gram(s) IV Intermittent every 8 hours  propylthiouracil 50 milliGRAM(s) Oral daily    Drug Dosing Weight  Height (cm): 144.78 (28 Nov 2017 18:44)  Weight (kg): 46.7 (28 Nov 2017 18:44)  BMI (kg/m2): 22.3 (28 Nov 2017 18:44)  BSA (m2): 1.36 (28 Nov 2017 18:44)    CENTRAL LINE: [x ] YES [ ] NO  LOCATION:  [facility] DATE INSERTED: 11/28  REMOVE: [ ] YES [ x] NO  EXPLAIN: intact line, within 7 days of insertion    MORALES: [x ] YES [ ] NO    DATE INSERTED: 11/28  REMOVE:  [ ] YES [ x] NO  EXPLAIN: withn 7 days of insertion, no sedimentation    A-LINE:  [ ] YES [x ] NO        ICU Vital Signs Last 24 Hrs  T(C): 37.2 (01 Dec 2017 10:00), Max: 37.3 (01 Dec 2017 05:00)  T(F): 98.9 (01 Dec 2017 10:00), Max: 99.2 (01 Dec 2017 05:00)  HR: 72 (01 Dec 2017 13:20) (55 - 80)  BP: 129/45 (01 Dec 2017 11:30) (107/53 - 132/50)  BP(mean): 64 (01 Dec 2017 11:30) (59 - 83)  RR: 16 (01 Dec 2017 11:30) (16 - 22)  SpO2: 100% (01 Dec 2017 13:20) (90% - 100%)      ABG - ( 01 Dec 2017 04:25 )  pH: 7.37  /  pCO2: 47    /  pO2: 106   / HCO3: 27    / Base Excess: 1.7   /  SaO2: 98                    11-30 @ 07:01 - 12-01 @ 07:00  --------------------------------------------------------  IN: 2717.7 mL / OUT: 800 mL / NET: 1917.7 mL        Mode: AC/ CMV (Assist Control/ Continuous Mandatory Ventilation)  RR (machine): 16  TV (machine): 400  FiO2: 50  PEEP: 5  ITime: 1  MAP: 8  PIP: 27      PHYSICAL EXAM:    GENERAL: no acute distress, sedated, intubated  HEAD: atraumatic, normocephalic   EYES: PERRL, white sclera.   ENMT: oral cavity moist, NG tube  NECK: supple,  no JVD  LYMPH: no palpable lymph nodes     SKIN: warm, dry   CHEST/LUNG: ET tube: 23  cm at lip. No Chest deformity , no chest tenderness. bilateral breath sounds, no  adventitious sounds  HEART: RRR, no m/r/g   ABDOMEN: soft, nontender, nondistended; bowel sounds.  :morales catheter.  EXTREMITIES: no edema, no cyanosis, no clubbing; hypertrophic nails  NEURO: sedated, nonverbal, noncommunicative        LABS:  CBC Full  -  ( 01 Dec 2017 06:38 )  WBC Count : 22.5 K/uL  Hemoglobin : 11.3 g/dL  Hematocrit : 36.8 %  Platelet Count - Automated : 167 K/uL  Mean Cell Volume : 103.9 fl  Mean Cell Hemoglobin : 31.8 pg  Mean Cell Hemoglobin Concentration : 30.6 gm/dL  Auto Neutrophil # : x  Auto Lymphocyte # : x  Auto Monocyte # : x  Auto Eosinophil # : x  Auto Basophil # : x  Auto Neutrophil % : x  Auto Lymphocyte % : x  Auto Monocyte % : x  Auto Eosinophil % : x  Auto Basophil % : x    12-01    146<H>  |  113<H>  |  18  ----------------------------<  87  3.7   |  27  |  0.22<L>    Ca    8.2<L>      01 Dec 2017 06:38  Phos  1.7     12-01  Mg     1.9     12-01          Culture Results:   No growth (11-30 @ 11:55)  Culture Results:   No growth to date. (11-30 @ 10:13)  Culture Results:   No growth to date. (11-30 @ 10:13)  Culture Results:   No growth to date. (11-29 @ 00:47)  Culture Results:   Growth in anaerobic bottle: Haemophilus influenzae  ***Blood Panel PCR results on this specimen are available  approximately 3 hours after the Gram stain result.***  Gram stain, PCR, and/or culture results may not always  correspond due to difference in methodologies.  ************************************************************  This PCR assay was performed using [employer].  The following targets are tested for: Enterococcus,  vancomycin resistant enterococci, Listeria monocytogenes,  coagulase negative staphylococci, S. aureus,  methicillin resistant S. aureus, Streptococcus agalactiae  (Group B), S. pneumoniae, S. pyogenes (Group A),  Acinetobacter baumannii, Enterobacter cloacae, E. coli,  Klebsiella oxytoca, K. pneumoniae, Proteus sp.,  Serratia marcescens, Haemophilus influenzae,  Neisseria meningitidis, Pseudomonas aeruginosa, Candida  albicans, C. glabrata, C krusei, C parapsilosis,  C. tropicalis and the KPC resistance gene. (11-29 @ 00:47)  Culture Results:   No growth (11-29 @ 00:19)      RADIOLOGY & ADDITIONAL STUDIES REVIEWED:     [ ]GOALS OF CARE DISCUSSION WITH PATIENT/FAMILY/PROXY:    CRITICAL CARE TIME SPENT: 35 minutes INTERVAL /OVERNIGHT EVENTS: CVP elevated (~18-20)- discontinues LR. Switched fentanyl drip to fentanyl 25 IV q8 PRN agitation. No acute events overnight.    PRESSORS: [ ] YES [x ] NO    ANTIBIOTICS: Zosyn                 DATE STARTED: 11/28      Antimicrobial:  piperacillin/tazobactam IVPB. 3.375 Gram(s) IV Intermittent every 8 hours    Cardiovascular:  norepinephrine Infusion 0.5 MICROgram(s)/kG/Min IV Continuous <Continuous>    Pulmonary: N/A    Hematalogic:  aspirin  chewable 81 milliGRAM(s) Oral daily  heparin  Injectable 5000 Unit(s) SubCutaneous every 8 hours    Other:  acetaminophen  Suppository 650 milliGRAM(s) Rectal every 6 hours PRN  ascorbic acid 500 milliGRAM(s) Oral daily  atorvastatin 40 milliGRAM(s) Oral at bedtime  dexmedetomidine Infusion 0.2 MICROgram(s)/kG/Hr IV Continuous <Continuous>  ergocalciferol 96888 Unit(s) Oral <User Schedule>  fentaNYL    Injectable 25 MICROGram(s) IV Push every 4 hours  ferrous    sulfate Liquid 300 milliGRAM(s) Enteral Tube daily  folic acid 1 milliGRAM(s) Oral daily  multivitamin 1 Tablet(s) Oral daily  pantoprazole   Suspension 40 milliGRAM(s) Oral daily  potassium acid phosphate/sodium acid phosphate tablet (K-PHOS No. 2) 1 Tablet(s) Oral four times a day with meals  propylthiouracil 50 milliGRAM(s) Oral daily    acetaminophen  Suppository 650 milliGRAM(s) Rectal every 6 hours PRN  ascorbic acid 500 milliGRAM(s) Oral daily  aspirin  chewable 81 milliGRAM(s) Oral daily  atorvastatin 40 milliGRAM(s) Oral at bedtime  dexmedetomidine Infusion 0.2 MICROgram(s)/kG/Hr IV Continuous <Continuous>  ergocalciferol 40303 Unit(s) Oral <User Schedule>  fentaNYL    Injectable 25 MICROGram(s) IV Push every 4 hours  ferrous    sulfate Liquid 300 milliGRAM(s) Enteral Tube daily  folic acid 1 milliGRAM(s) Oral daily  heparin  Injectable 5000 Unit(s) SubCutaneous every 8 hours  multivitamin 1 Tablet(s) Oral daily  norepinephrine Infusion 0.5 MICROgram(s)/kG/Min IV Continuous <Continuous>  pantoprazole   Suspension 40 milliGRAM(s) Oral daily  piperacillin/tazobactam IVPB. 3.375 Gram(s) IV Intermittent every 8 hours  potassium acid phosphate/sodium acid phosphate tablet (K-PHOS No. 2) 1 Tablet(s) Oral four times a day with meals  propylthiouracil 50 milliGRAM(s) Oral daily    Drug Dosing Weight  Height (cm): 144.78 (28 Nov 2017 18:44)  Weight (kg): 46.7 (28 Nov 2017 18:44)  BMI (kg/m2): 22.3 (28 Nov 2017 18:44)  BSA (m2): 1.36 (28 Nov 2017 18:44)    CENTRAL LINE: [x ] YES [ ] NO  LOCATION:   DATE INSERTED:  REMOVE: [ ] YES [ ] NO  EXPLAIN:    MORALES: [x ] YES [ ] NO    DATE INSERTED:  REMOVE:  [ ] YES [ ] NO  EXPLAIN:    A-LINE:  [ ] YES [x ] NO        Abdominal Nutrition Status: malnutrition [x ] cachexia [ ] morbid obesity/BMI=40 [ ] Supplement ordered [[place] tube feeds]     ICU Vital Signs Last 24 Hrs  T(C): 37.6 (02 Dec 2017 08:00), Max: 37.7 (02 Dec 2017 05:00)  T(F): 99.7 (02 Dec 2017 08:00), Max: 99.8 (02 Dec 2017 05:00)  HR: 67 (02 Dec 2017 11:00) (58 - 102)  BP: 95/37 (02 Dec 2017 11:00) (90/38 - 171/75)  BP(mean): 49 (02 Dec 2017 11:00) (49 - 101)  RR: 21 (02 Dec 2017 11:00) (15 - 29)  SpO2: 99% (02 Dec 2017 11:00) (95% - 100%)      ABG - ( 02 Dec 2017 04:52 )  pH: 7.43  /  pCO2: 44    /  pO2: 102   / HCO3: 29    / Base Excess: 4.7   /  SaO2: 98                    12-01 @ 07:01  -  12-02 @ 07:00  --------------------------------------------------------  IN: 7191.2 mL / OUT: 1745 mL / NET: 5446.2 mL        Mode: AC/ CMV (Assist Control/ Continuous Mandatory Ventilation)  RR (machine): 16  TV (machine): 400  FiO2: 50  PEEP: 5  ITime: 1  MAP: 12  PIP: 34      PHYSICAL EXAM:  GENERAL: no acute distress, sedated, intubated  HEAD: atraumatic, normocephalic   EYES: PERRL, white sclera.   ENMT: oral cavity moist, NG tube  NECK: supple,  no JVD  LYMPH: no palpable lymph nodes     SKIN: warm, dry   CHEST/LUNG: ET tube: 23  cm at lip. No Chest deformity , no chest tenderness. bilateral breath sounds, no  adventitious sounds  HEART: RRR, no m/r/g   ABDOMEN: soft, nontender, nondistended; bowel sounds.  :morales catheter.  EXTREMITIES: no edema, no cyanosis, no clubbing; hypertrophic nails  NEURO: sedated, nonverbal, noncommunicative        LABS:  CBC Full  -  ( 02 Dec 2017 07:36 )  WBC Count : 24.2 K/uL  Hemoglobin : 10.6 g/dL  Hematocrit : 34.8 %  Platelet Count - Automated : 140 K/uL  Mean Cell Volume : 103.4 fl  Mean Cell Hemoglobin : 31.7 pg  Mean Cell Hemoglobin Concentration : 30.6 gm/dL  Auto Neutrophil # : x  Auto Lymphocyte # : x  Auto Monocyte # : x  Auto Eosinophil # : x  Auto Basophil # : x  Auto Neutrophil % : x  Auto Lymphocyte % : x  Auto Monocyte % : x  Auto Eosinophil % : x  Auto Basophil % : x    12-02    147<H>  |  112<H>  |  11  ----------------------------<  185<H>  3.4<L>   |  29  |  0.31<L>    Ca    7.9<L>      02 Dec 2017 07:36  Phos  1.5     12-02  Mg     1.8     12-02          Culture Results:   No growth (11-30 @ 11:55)  Culture Results:   No growth to date. (11-30 @ 10:13)  Culture Results:   No growth to date. (11-30 @ 10:13)      RADIOLOGY & ADDITIONAL STUDIES REVIEWED:     [ ]GOALS OF CARE DISCUSSION WITH PATIENT/FAMILY/PROXY:    CRITICAL CARE TIME SPENT: 35 minutes

## 2017-12-02 NOTE — PROGRESS NOTE ADULT - SUBJECTIVE AND OBJECTIVE BOX
· Subjective and Objective: 	  INTERVAL /OVERNIGHT EVENTS: CVP elevated (~18-20)- discontinues LR. Switched fentanyl drip to fentanyl 25 IV q8 PRN agitation. No acute events overnight.    PRESSORS: [x ] YES [ ] NO  WHICH: phenylephrine, norepinephrine    ANTIBIOTICS:    zosyn              DATE STARTED: 11/28      Antimicrobial:  piperacillin/tazobactam IVPB. 3.375 Gram(s) IV Intermittent every 8 hours    Cardiovascular:  norepinephrine Infusion 0.5 MICROgram(s)/kG/Min IV Continuous <Continuous>  phenylephrine    Infusion 0.5 MICROgram(s)/kG/Min IV Continuous <Continuous>    Pulmonary: N/A    Hematalogic:  aspirin  chewable 81 milliGRAM(s) Oral daily  heparin  Injectable 5000 Unit(s) SubCutaneous every 8 hours    Other:  acetaminophen  Suppository 650 milliGRAM(s) Rectal every 6 hours PRN  ascorbic acid 500 milliGRAM(s) Oral daily  atorvastatin 40 milliGRAM(s) Oral at bedtime  dexmedetomidine Infusion 0.2 MICROgram(s)/kG/Hr IV Continuous <Continuous>  ergocalciferol 68757 Unit(s) Oral <User Schedule>  fentaNYL   Infusion 0.5 MICROgram(s)/kG/Hr IV Continuous <Continuous>  ferrous    sulfate Liquid 300 milliGRAM(s) Enteral Tube daily  folic acid 1 milliGRAM(s) Oral daily  lactated ringers. 1000 milliLiter(s) IV Continuous <Continuous>  multivitamin 1 Tablet(s) Oral daily  pantoprazole  Injectable 40 milliGRAM(s) IV Push daily  propylthiouracil 50 milliGRAM(s) Oral daily    acetaminophen  Suppository 650 milliGRAM(s) Rectal every 6 hours PRN  ascorbic acid 500 milliGRAM(s) Oral daily  aspirin  chewable 81 milliGRAM(s) Oral daily  atorvastatin 40 milliGRAM(s) Oral at bedtime  dexmedetomidine Infusion 0.2 MICROgram(s)/kG/Hr IV Continuous <Continuous>  ergocalciferol 84561 Unit(s) Oral <User Schedule>  fentaNYL   Infusion 0.5 MICROgram(s)/kG/Hr IV Continuous <Continuous>  ferrous    sulfate Liquid 300 milliGRAM(s) Enteral Tube daily  folic acid 1 milliGRAM(s) Oral daily  heparin  Injectable 5000 Unit(s) SubCutaneous every 8 hours  lactated ringers. 1000 milliLiter(s) IV Continuous <Continuous>  multivitamin 1 Tablet(s) Oral daily  norepinephrine Infusion 0.5 MICROgram(s)/kG/Min IV Continuous <Continuous>  pantoprazole  Injectable 40 milliGRAM(s) IV Push daily  phenylephrine    Infusion 0.5 MICROgram(s)/kG/Min IV Continuous <Continuous>  piperacillin/tazobactam IVPB. 3.375 Gram(s) IV Intermittent every 8 hours  propylthiouracil 50 milliGRAM(s) Oral daily    Drug Dosing Weight  Height (cm): 144.78 (28 Nov 2017 18:44)  Weight (kg): 46.7 (28 Nov 2017 18:44)  BMI (kg/m2): 22.3 (28 Nov 2017 18:44)  BSA (m2): 1.36 (28 Nov 2017 18:44)    CENTRAL LINE: [x ] YES [ ] NO  LOCATION:  [facility] DATE INSERTED: 11/28  REMOVE: [ ] YES [ x] NO  EXPLAIN: intact line, within 7 days of insertion    MORALES: [x ] YES [ ] NO    DATE INSERTED: 11/28  REMOVE:  [ ] YES [ x] NO  EXPLAIN: withn 7 days of insertion, no sedimentation    A-LINE:  [ ] YES [x ] NO        ICU Vital Signs Last 24 Hrs  T(C): 37.2 (01 Dec 2017 10:00), Max: 37.3 (01 Dec 2017 05:00)  T(F): 98.9 (01 Dec 2017 10:00), Max: 99.2 (01 Dec 2017 05:00)  HR: 72 (01 Dec 2017 13:20) (55 - 80)  BP: 129/45 (01 Dec 2017 11:30) (107/53 - 132/50)  BP(mean): 64 (01 Dec 2017 11:30) (59 - 83)  RR: 16 (01 Dec 2017 11:30) (16 - 22)  SpO2: 100% (01 Dec 2017 13:20) (90% - 100%)      ABG - ( 01 Dec 2017 04:25 )  pH: 7.37  /  pCO2: 47    /  pO2: 106   / HCO3: 27    / Base Excess: 1.7   /  SaO2: 98                    11-30 @ 07:01  -  12-01 @ 07:00  --------------------------------------------------------  IN: 2717.7 mL / OUT: 800 mL / NET: 1917.7 mL        Mode: AC/ CMV (Assist Control/ Continuous Mandatory Ventilation)  RR (machine): 16  TV (machine): 400  FiO2: 50  PEEP: 5  ITime: 1  MAP: 8  PIP: 27      PHYSICAL EXAM:    GENERAL: no acute distress, sedated, intubated  HEAD: atraumatic, normocephalic   EYES: PERRL, white sclera.   ENMT: oral cavity moist, NG tube  NECK: supple,  no JVD  LYMPH: no palpable lymph nodes     SKIN: warm, dry   CHEST/LUNG: ET tube: 23  cm at lip. No Chest deformity , no chest tenderness. bilateral breath sounds, no  adventitious sounds  HEART: RRR, no m/r/g   ABDOMEN: soft, nontender, nondistended; bowel sounds.  :morales catheter.  EXTREMITIES: no edema, no cyanosis, no clubbing; hypertrophic nails  NEURO: sedated, nonverbal, noncommunicative        LABS:  CBC Full  -  ( 01 Dec 2017 06:38 )  WBC Count : 22.5 K/uL  Hemoglobin : 11.3 g/dL  Hematocrit : 36.8 %  Platelet Count - Automated : 167 K/uL  Mean Cell Volume : 103.9 fl  Mean Cell Hemoglobin : 31.8 pg  Mean Cell Hemoglobin Concentration : 30.6 gm/dL  Auto Neutrophil # : x  Auto Lymphocyte # : x  Auto Monocyte # : x  Auto Eosinophil # : x  Auto Basophil # : x  Auto Neutrophil % : x  Auto Lymphocyte % : x  Auto Monocyte % : x  Auto Eosinophil % : x  Auto Basophil % : x    12-01    146<H>  |  113<H>  |  18  ----------------------------<  87  3.7   |  27  |  0.22<L>    Ca    8.2<L>      01 Dec 2017 06:38  Phos  1.7     12-01  Mg     1.9     12-01          Culture Results:   No growth (11-30 @ 11:55)  Culture Results:   No growth to date. (11-30 @ 10:13)  Culture Results:   No growth to date. (11-30 @ 10:13)  Culture Results:   No growth to date. (11-29 @ 00:47)  Culture Results:   Growth in anaerobic bottle: Haemophilus influenzae  ***Blood Panel PCR results on this specimen are available  approximately 3 hours after the Gram stain result.***  Gram stain, PCR, and/or culture results may not always  correspond due to difference in methodologies.  ************************************************************  This PCR assay was performed using [email or web address].  The following targets are tested for: Enterococcus,  vancomycin resistant enterococci, Listeria monocytogenes,  coagulase negative staphylococci, S. aureus,  methicillin resistant S. aureus, Streptococcus agalactiae  (Group B), S. pneumoniae, S. pyogenes (Group A),  Acinetobacter baumannii, Enterobacter cloacae, E. coli,  Klebsiella oxytoca, K. pneumoniae, Proteus sp.,  Serratia marcescens, Haemophilus influenzae,  Neisseria meningitidis, Pseudomonas aeruginosa, Candida  albicans, C. glabrata, C krusei, C parapsilosis,  C. tropicalis and the KPC resistance gene. (11-29 @ 00:47)  Culture Results:   No growth (11-29 @ 00:19)      RADIOLOGY & ADDITIONAL STUDIES REVIEWED:     [ ]GOALS OF CARE DISCUSSION WITH PATIENT/FAMILY/PROXY:    CRITICAL CARE TIME SPENT: 35 minutes    Assessment and Plan:   · Assessment		  Patient was intubated in the ED, tachycardic to 110-130's in the ED, BP improved to 156/72 after starting levophed,  No leucocytosis, but has a left shift, BMP shows hypernatremia to 151, elevated BUN to 45, lactate is 3.6. MCV elevated to 104.1.   ABG on s/p intubation on FiO2 of 100%, TV: 400, PEEP of 5 is pH: 7.22, PaCO2: 66, PaO2: 95, Bicarb: 26, O2 Sat of 100%  s/p 2L NS bolus and vanc and zosyn in the ED  CXR: Patchy right perihilar opacities, which may be due to pneumonia. Mild diffuse interstitial prominence. Small bilateral pleural effusions.  EKG - LVH , LAD , incomplete LBBB, QRS     Admitted to ICU for hypoxic and hypercapnia respiratory failure and septic shock requiring pressors 2/2 PNA found to be bacteremic with H. influenza           Problem/Plan - 1:  ·  Problem: Septic shock.  Plan: 2/2 RLL pneumonia, found to be bacteremic 2/2 H. influenzae (gram neg.)  no leucocytosis, overnight Tmax 100.2  c/w fentanyl, precedex , titrate for RASS   c/w phenylephrine , titrate for MAP of 65  lactate trended down to 1.7 on 11/30  RVP positive  legionella - negative, d/c'ed levaquin  BCx positive for gram negative coccobacillus - Haemophilus influenzae, c/w zosyn per ID recommendation, awaiting sensitivity  Ucx negative to date  CVP low 12/1, gave another 2-3 L bolus of lactate due to low CVP of 8- Improved  Will hold off to LR for now 2/2 to elevated CVP  titrating to goal CVP of 12-14.      Problem/Plan - 2:  ·  Problem: Respiratory failure.  Plan: hypoxic and hypercapnia respiratory failure 2/2 RLL pneumonia, found to be bacteremic with H. influenza, RVP positive  BG on s/p intubation on FiO2 of 100%, TV: 400, PEEP of 5 is   pH: 7.22, PaCO2: 66, PaO2: 95, Bicarb: 26, O2 Sat of 100%  c/w sedation , titrate for RASS score of -2  c/w levophed , titrate for MAP of 65.      Problem/Plan - 3:  ·  Problem: EKG abnormalities.  Plan: EKG - LVH , LAD , incomplete LBBB, QRS   T1 T2 elevated at 1.5, trended down T3 to 1.03  monitor on tele  c/w aspirin , statin through NG tube   no BB due to hypotension  pt had acute onset of Afib-given one dose amiodarone and converted back to sinus rhythm  f/u repeat echo  f/u cardio consult = Dr. Garrison.      Problem/Plan - 4:  ·  Problem: Hypernatremia.  Plan: Na 150  s/p 1 bolus LR  300ml FW q6.      Problem/Plan - 5:  ·  Problem: Prophylactic measure.  Plan: c/w GI ppx and DVT as ordered  IMPROVE - 2.

## 2017-12-02 NOTE — ADVANCED PRACTICE NURSE CONSULT - RECOMMEDATIONS
-Clean the Sacrococcyx area with normal saline and apply skin prep to the surrounding skin  -Apply TRIAD Moisture Barrier Cream b.i.d. PRN  -Strict offloading to the Sacrococcyx Area Q 2hrs using wedges or pillows  -Continue to elevate/float the patients heels using heel protectors

## 2017-12-02 NOTE — PROGRESS NOTE ADULT - SUBJECTIVE AND OBJECTIVE BOX
Patient is seen and examined at the bed side,  remains afebrile.  She remains intubated and has moderate yellowish thin secretion,  but off pressor. The Leukocytosis is worsening, has no diarrhea as per Nursing  staff.            REVIEW OF SYSTEMS: Unable to obtain since intubated/sedated          ICU Vital Signs Last 24 Hrs  T(C): 37.1 (02 Dec 2017 16:00), Max: 37.7 (02 Dec 2017 05:00)  T(F): 98.8 (02 Dec 2017 16:00), Max: 99.8 (02 Dec 2017 05:00)  HR: 75 (02 Dec 2017 18:30) (54 - 95)  BP: 95/41 (02 Dec 2017 18:30) (90/38 - 171/75)  BP(mean): 55 (02 Dec 2017 18:30) (49 - 101)  ABP: --  ABP(mean): --  RR: 24 (02 Dec 2017 18:30) (15 - 41)  SpO2: 96% (02 Dec 2017 18:30) (96% - 100%)            PHYSICAL EXAM:    GENERAL: Intubated/sedated  HEENT: ET tube in placed  CVS: s1 and s2 present  RESP: Air entry B/L   GI: abdomen soft and nontender  EXT: No pedal  edema, Right hip staples in placed, site looks clean, no  redness  CNS: intubated/sedated              ALLERGIES: NKDA                LABS:                          10.6   24.2  )-----------( 140      ( 02 Dec 2017 07:36 )             34.8                             11.3   22.5  )-----------( 167      ( 01 Dec 2017 06:38 )             36.8                           11.6   18.5  )-----------( 206      ( 2017 13:39 )             38.3         12-    148<H>  |  112<H>  |  12  ----------------------------<  144<H>  3.6   |  31  |  0.28<L>    Ca    7.8<L>      02 Dec 2017 17:55  Phos  2.5     12-  Mg     1.8     12-      TPro  4.8<L>  /  Alb  1.7<L>  /  TBili  0.8  /  DBili  x   /  AST  39  /  ALT  29  /  AlkPhos  119  11-29        PT/INR - ( 2017 11:02 )   PT: 17.7 sec;   INR: 1.61 ratio         PTT - ( 2017 11:02 )  PTT:41.2 sec  Urinalysis Basic - ( 2017 16:48 )    Color: Yellow / Appearance: Slightly Turbid / S.020 / pH: x  Gluc: x / Ketone: Small  / Bili: Negative / Urobili: 4   Blood: x / Protein: 100 / Nitrite: Negative   Leuk Esterase: Negative / RBC: 5-10 /HPF / WBC 3-5 /HPF   Sq Epi: x / Non Sq Epi: Moderate /HPF / Bacteria: Many /HPF              ABG - ( 2017 05:30 )  pH: 7.38  /  pCO2: 43    /  pO2: 99    / HCO3: 24    / Base Excess: -0.3  /  SaO2: 98                MEDICATIONS  (STANDING):  ascorbic acid 500 milliGRAM(s) Oral daily  aspirin  chewable 81 milliGRAM(s) Oral daily  atorvastatin 40 milliGRAM(s) Oral at bedtime  dexmedetomidine Infusion 0.2 MICROgram(s)/kG/Hr (3 mL/Hr) IV Continuous <Continuous>  ergocalciferol 48906 Unit(s) Oral <User Schedule>  fentaNYL    Injectable 25 MICROGram(s) IV Push every 4 hours  ferrous    sulfate Liquid 300 milliGRAM(s) Enteral Tube daily  folic acid 1 milliGRAM(s) Oral daily  heparin  Injectable 5000 Unit(s) SubCutaneous every 8 hours  multivitamin 1 Tablet(s) Oral daily  pantoprazole   Suspension 40 milliGRAM(s) Oral daily  piperacillin/tazobactam IVPB. 3.375 Gram(s) IV Intermittent every 8 hours  propylthiouracil 50 milliGRAM(s) Oral daily    MEDICATIONS  (PRN):  acetaminophen  Suppository 650 milliGRAM(s) Rectal every 6 hours PRN For Temp greater than 38 C (100.4 F)                  RADIOLOGY & ADDITIONAL TESTS:      17 : Xray Chest 1 View AP -PORTABLE-Routine (17 @ 09:34) : No change in small bilateral pleural effusions and right perihilar airspace disease.        17 : Xray Chest 1 View AP- PORTABLE-Urgent (17 @ 11:51) : Enteric tube has been advanced, below the left hemidiaphragm, tip collimated off the field-of-view. ET tube and left IJ catheter remain in  place. No pneumothorax. Persistent right midlung opacity. Mild diffuse interstitial prominence.  Small bilateral pleural effusions.          17 : Xray Chest 1 View AP/PA (17 @ 05:48) : Increased lung volumes consistent with COPD. Bones are again quite  demineralized. Endotracheal tube is in good position. Nasogastric tube courses into the abdominal area off the lower edge of the film. Heart is grossly normal in size.  Significant infiltrates are seen again bilaterally but most pronounced in the right perihilar area.      17 : Xray Chest 1 View AP -PORTABLE-Routine (17 @ 16:28) : Patchy right perihilar opacities, which may be due to pneumonia. Mild  diffuse interstitial prominence. Small bilateral pleural effusions.            MICROBIOLOGY DATA:      Culture - Sputum . (17 @ 11:55)    Gram Stain:   Moderate polymorphonuclear leukocytes per low power field  Rare Squamous epithelial cells per low power field  Rare Gram Positive Cocci per oil power field    Specimen Source: .Sputum Sputum - trap    Culture Results:   No growth      Culture - Blood (17 @ 10:13)    Specimen Source: .Blood Blood-Peripheral    Culture Results:   No growth to date.    Culture - Blood (17 @ 10:13)    Specimen Source: .Blood Blood-Peripheral    Culture Results:   No growth to date.        Legionella pneumophila Antigen, Urine (17 @ 17:17)    Legionella Antigen, Urine: Negative          Culture - Blood (17 @ 00:47)    -  Haemophilus influenzae: Detec    Gram Stain:   Growth in anaerobic bottle:  Gram Negative Coccobacilli    Specimen Source: .Blood Blood-Venous    Organism: Blood Culture PCR    Culture Results:   Growth in anaerobic bottle:  Gram Negative Coccobacilli  ***Blood Panel PCR results on this specimen are available  approximately 3 hours after the Gram stain result.***  Gram stain, PCR, and/or culture results may not always  correspond due to difference in methodologies.  ************************************************************  This PCR assay was performed using [employer].  The following targets are tested for: Enterococcus,  vancomycin resistant enterococci, Listeria monocytogenes,  coagulase negative staphylococci, S. aureus,  methicillin resistant S. aureus, Streptococcus agalactiae  (Group B), S. pneumoniae, S. pyogenes (Group A),  Acinetobacter baumannii, Enterobacter cloacae, E. coli,  Klebsiella oxytoca, K. pneumoniae,Proteus sp.,  Serratia marcescens, Haemophilus influenzae,  Neisseria meningitidis, Pseudomonas aeruginosa, Candida  albicans, C. glabrata, C krusei, C parapsilosis,  C. tropicalis and the KPC resistance gene.    Organism Identification: Blood Culture PCR    Method Type: PCR          Culture - Blood (17 @ 00:47)    Specimen Source: .Blood Blood-Peripheral    Culture Results:   No growth to date.      Culture - Urine (17 @ 00:19)    Specimen Source: .Urine Catheterized    Culture Results:   No growth            Rapid Respiratory Viral Panel (17 @ 19:15)    Rapid RVP Result: Detected: The [employer] RVP Rapid uses polymerase chain reaction (PCR) and melt  curve analysis to screen for adenovirus; coronavirus HKU1, NL63, 229E,  OC43; human metapneumovirus (hMPV); human enterovirus/rhinovirus  (Entero/RV); influenza A; influenza A/H1;influenza A/H3; influenza  A/H1-2009; influenza B; parainfluenza viruses 1, 2, 3, 4; respiratory  syncytial virus; Bordetella pertussis; Mycoplasma pneumoniae; and  Chlamydophila pneumoniae.

## 2017-12-02 NOTE — PROGRESS NOTE ADULT - ASSESSMENT
A 89 yo Female with right femur neck fx, difficulty walking,  brought in to ER by EMS from [facility] for  evaluation of  difficulty of breathing. In the ER, she found to be tachycardic, tachypneic up to 50's, hypotensive to SBP of 50's and lethargic and required intubation. She also has leukocytosis and chest xray shows right sided pneumonia. She has started on Levaquin, Vancomycin and zosyn, cultures pending. The ID consult requested to assist with further evaluation and antibiotic  management.       # Septic shock- on pressor  # Right sided pneumonia  # RSV  # Bacteremia-  Haemophilus influenzae  # Repeat Blood culture- No growth to date 11/30/17      Would recommend:  1. Monitor WBC count. is worsening  2. Please call micro Lab for sensitivity of  Haemophilus influenzae  3. Continue  Zosyn until sensitivity is available, since Up to 50% strains worldwide  produce beta lactamase.  4. Management of vent. as per ICU protocol  5. Supportive care for RSV and Isolation as per Hospital policy      d/w ICU team    will follow the patient with you A 88 yo Female with right femur neck fx, difficulty walking,  brought in to ER by EMS from [facility] for  evaluation of  difficulty of breathing. In the ER, she found to be tachycardic, tachypneic up to 50's, hypotensive to SBP of 50's and lethargic and required intubation. She also has leukocytosis and chest xray shows right sided pneumonia. She has started on Levaquin, Vancomycin and zosyn, cultures pending. The ID consult requested to assist with further evaluation and antibiotic  management.       # Septic shock- off pressor  # Right sided pneumonia  # RSV  # Bacteremia-  Haemophilus influenzae  # Repeat Blood culture- No growth to date 11/30/17      Would recommend:  1. Monitor WBC count. is worsening  2. Please call micro Lab for sensitivity of  Haemophilus influenzae  3. Continue  Zosyn until sensitivity is available, since Up to 50% strains worldwide  produce beta lactamase.  4. Management of vent. as per ICU protocol  5. Supportive care for RSV and Isolation as per Hospital policy      d/w ICU team    will follow the patient with you

## 2017-12-03 NOTE — PROGRESS NOTE ADULT - SUBJECTIVE AND OBJECTIVE BOX
INTERVAL HPI/OVERNIGHT EVENTS: telemetry notable for aflutter/afib, EKG done NSR, otherwise no acute events    PRESSORS: NO    ANTIBIOTICS: Rocephin                  DATE STARTED: 12/2    Antimicrobial:  cefTRIAXone   IVPB 2 Gram(s) IV Intermittent every 24 hours  cefTRIAXone   IVPB        Cardiovascular:    Pulmonary:    Hematalogic:  aspirin  chewable 81 milliGRAM(s) Oral daily  heparin  Injectable 5000 Unit(s) SubCutaneous every 8 hours    Other:  acetaminophen  Suppository 650 milliGRAM(s) Rectal every 6 hours PRN  ascorbic acid 500 milliGRAM(s) Oral daily  atorvastatin 40 milliGRAM(s) Oral at bedtime  dexmedetomidine Infusion 0.2 MICROgram(s)/kG/Hr IV Continuous <Continuous>  ergocalciferol 50817 Unit(s) Oral <User Schedule>  fentaNYL    Injectable 25 MICROGram(s) IV Push every 4 hours  ferrous    sulfate Liquid 300 milliGRAM(s) Enteral Tube daily  folic acid 1 milliGRAM(s) Oral daily  HYDROmorphone  Injectable 1 milliGRAM(s) IV Push every 4 hours PRN  multivitamin 1 Tablet(s) Oral daily  pantoprazole   Suspension 40 milliGRAM(s) Oral daily  propylthiouracil 50 milliGRAM(s) Oral daily    acetaminophen  Suppository 650 milliGRAM(s) Rectal every 6 hours PRN  ascorbic acid 500 milliGRAM(s) Oral daily  aspirin  chewable 81 milliGRAM(s) Oral daily  atorvastatin 40 milliGRAM(s) Oral at bedtime  cefTRIAXone   IVPB 2 Gram(s) IV Intermittent every 24 hours  cefTRIAXone   IVPB      dexmedetomidine Infusion 0.2 MICROgram(s)/kG/Hr IV Continuous <Continuous>  ergocalciferol 53406 Unit(s) Oral <User Schedule>  fentaNYL    Injectable 25 MICROGram(s) IV Push every 4 hours  ferrous    sulfate Liquid 300 milliGRAM(s) Enteral Tube daily  folic acid 1 milliGRAM(s) Oral daily  heparin  Injectable 5000 Unit(s) SubCutaneous every 8 hours  HYDROmorphone  Injectable 1 milliGRAM(s) IV Push every 4 hours PRN  multivitamin 1 Tablet(s) Oral daily  pantoprazole   Suspension 40 milliGRAM(s) Oral daily  propylthiouracil 50 milliGRAM(s) Oral daily    Drug Dosing Weight  Height (cm): 144.78 (28 Nov 2017 18:44)  Weight (kg): 46.7 (28 Nov 2017 18:44)  BMI (kg/m2): 22.3 (28 Nov 2017 18:44)  BSA (m2): 1.36 (28 Nov 2017 18:44)    CENTRAL LINE: [X] YES [] NO  LOCATION: [facility]  DATE INSERTED: 11/29  REMOVE: [] YES [x] NO  EXPLAIN: Possible pressor support, hypotensive overnight    VOSS: [x] YES [] NO    DATE INSERTED:  REMOVE:  [] YES [] NO  EXPLAIN:    A-LINE:  NO    PMH -reviewed admission note, no change since admission  PAST MEDICAL & SURGICAL HISTORY:  Psoriasis  Dementia  No significant past surgical history      ICU Vital Signs Last 24 Hrs  T(C): 37.1 (02 Dec 2017 23:30), Max: 37.7 (02 Dec 2017 05:00)  T(F): 98.7 (02 Dec 2017 23:30), Max: 99.8 (02 Dec 2017 05:00)  HR: 57 (03 Dec 2017 02:00) (54 - 88)  BP: 112/92 (03 Dec 2017 02:00) (90/36 - 171/75)  BP(mean): 97 (03 Dec 2017 02:00) (49 - 101)  ABP: --  ABP(mean): --  RR: 19 (03 Dec 2017 02:00) (17 - 41)  SpO2: 94% (03 Dec 2017 02:00) (94% - 100%)      ABG - ( 02 Dec 2017 04:52 )  pH: 7.43  /  pCO2: 44    /  pO2: 102   / HCO3: 29    / Base Excess: 4.7   /  SaO2: 98                    12-01 @ 07:01  -  12-02 @ 07:00  --------------------------------------------------------  IN: 7191.2 mL / OUT: 1745 mL / NET: 5446.2 mL        Mode: AC/ CMV (Assist Control/ Continuous Mandatory Ventilation)  RR (machine): 16  TV (machine): 400  FiO2: 40  PEEP: 5  ITime: 1  MAP: 9  PIP: 29      PHYSICAL EXAM:    GENERAL: [x]NAD, []well-groomed, []well-developed  HEAD:  [x]Atraumatic, []Normocephalic  EYES: []EOMI, []PERRLA, [x]conjunctiva and sclera clear  ENMT: []No tonsillar erythema, exudates, or enlargement; [x]Moist mucous membranes, []Good dentition, []No lesions  NECK: []Supple, normal appearance, [x]No JVD; []Normal thyroid; []Trachea midline  NERVOUS SYSTEM:  [x]Intubated and sedated, []Good concentration; []Motor Strength 5/5 B/L upper and lower extremities; []DTRs 2+ intact and symmetric  CHEST/LUNG: [x]No chest deformity; []Normal percussion bilaterally; []No rales, rhonchi, wheezing   HEART: [x]Regular rate and rhythm; []No murmurs, rubs, or gallops  ABDOMEN: [x]Soft, Nontender, Nondistended; []Bowel sounds present  EXTREMITIES:  []2+ Peripheral Pulses, [x]No clubbing, cyanosis, or edema  LYMPH: [x]No lymphadenopathy noted  SKIN: [x]No rashes or lesions; []Good capillary refill      LABS:  CBC Full  -  ( 02 Dec 2017 07:36 )  WBC Count : 24.2 K/uL  Hemoglobin : 10.6 g/dL  Hematocrit : 34.8 %  Platelet Count - Automated : 140 K/uL  Mean Cell Volume : 103.4 fl  Mean Cell Hemoglobin : 31.7 pg  Mean Cell Hemoglobin Concentration : 30.6 gm/dL  Auto Neutrophil # : x  Auto Lymphocyte # : x  Auto Monocyte # : x  Auto Eosinophil # : x  Auto Basophil # : x  Auto Neutrophil % : x  Auto Lymphocyte % : x  Auto Monocyte % : x  Auto Eosinophil % : x  Auto Basophil % : x    12-02    148<H>  |  112<H>  |  12  ----------------------------<  144<H>  3.6   |  31  |  0.28<L>    Ca    7.8<L>      02 Dec 2017 17:55  Phos  2.5     12-02  Mg     1.8     12-02          Culture Results:   No growth (11-30 @ 11:55)  Culture Results:   No growth to date. (11-30 @ 10:13)  Culture Results:   No growth to date. (11-30 @ 10:13)      RADIOLOGY & ADDITIONAL STUDIES REVIEWED:  ***    []GOALS OF CARE DISCUSSION WITH PATIENT/FAMILY/PROXY:    CRITICAL CARE TIME SPENT: 35 minutes

## 2017-12-03 NOTE — PROGRESS NOTE ADULT - PROBLEM SELECTOR PLAN 1
2/2 RLL pneumonia, found to be bacteremic 2/2 H. influenzae (gram neg.)  no leucocytosis, afebrile overnight Tmax 100.2  c/w fentanyl, precedex , titrate for RASS   c/w phenylephrine , titrate for MAP of 65  lactate trended down to 1.7 on 11/30  RVP positive  legionella - negative, d/c'ed levaquin  BCx positive for gram negative coccobacillus - Haemophilus influenzae, c/w zosyn per ID recommendation, awaiting sensitivity  Ucx negative to date  CVP low 12/1, gave another 2-3 L bolus of lactate due to low CVP of 8- Improved  d/c LR, d/c'ed pressors 2/2 RLL pneumonia, found to be bacteremic 2/2 H. influenzae (gram neg.)  no leucocytosis, afebrile overnight   c/w precedex for sedation  d/flavia pressors, s/p1 L LR for SBP in 90's  RVP positive  legionella - negative, d/c'ed levaquin  BCx positive for gram negative coccobacillus - Haemophilus influenzae,sensitive to ceftriaxone  Ucx negative to date 2/2 RLL pneumonia, found to be bacteremic 2/2 H. influenzae (gram neg.)  no leucocytosis, afebrile overnight   c/w precedex for sedation  d/flavia pressors, s/p1 L LR for SBP in 90's  RVP positive  legionella - negative, d/c'ed levaquin  BCx positive for gram negative coccobacillus - Haemophilus influenzae,sensitive to ceftriaxone  Will give albumin 25gQ6 for 4 doses  Concern for pulmonary congestion however cannot give lasix 2/2 to low BP

## 2017-12-03 NOTE — PROGRESS NOTE ADULT - PROBLEM SELECTOR PLAN 4
Na 147  s/p 1 bolus LR  c/w 250ml FW q6, c/w tube feeds Na 146  s/p 1 bolus LR  c/w 250ml FW q6, c/w tube feeds

## 2017-12-03 NOTE — PROGRESS NOTE ADULT - PROBLEM SELECTOR PLAN 3
EKG - LVH , LAD , incomplete LBBB, QRS   T1 T2 elevated at 1.5, trended down T3 to 1.03  monitor on tele  c/w aspirin , statin through NG tube   no BB due to hypotension  pt had acute onset of Afib-given one dose amiodarone and converted back to sinus rhythm  f/u repeat echo  f/u cardio consult = Dr. Garrison EKG - LVH , LAD , incomplete LBBB, QRS   T1 T2 elevated at 1.5, trended down T3 to 1.03  monitor on tele  c/w aspirin , statin through NG tube   no BB due to hypotension  pt had acute onset of Afib-given one dose amiodarone and converted back to sinus rhythm  f/u cardio consult = Dr. Garrison

## 2017-12-03 NOTE — PROGRESS NOTE ADULT - PROBLEM SELECTOR PLAN 2
hypoxic and hypercapnia respiratory failure 2/2 RLL pneumonia, found to be bacteremic with H. influenza, RVP positive  s/p intubation  c/w fentanyl 25mcg q4h   c/w precedex hypoxic and hypercapnia respiratory failure 2/2 RLL pneumonia, found to be bacteremic with H. influenza, RVP positive  s/p intubation  c/w precedex hypoxic and hypercapnia respiratory failure 2/2 RLL pneumonia, found to be bacteremic with H. influenza, RVP positive  s/p intubation  c/w precedex  failed SBT in AM

## 2017-12-03 NOTE — PROGRESS NOTE ADULT - SUBJECTIVE AND OBJECTIVE BOX
· Subjective and Objective: 	  INTERVAL HPI/OVERNIGHT EVENTS: telemetry notable for aflutter/afib, EKG done NSR, otherwise no acute events    PRESSORS: NO    ANTIBIOTICS: Rocephin                  DATE STARTED: 12/2    Antimicrobial:  cefTRIAXone   IVPB 2 Gram(s) IV Intermittent every 24 hours  cefTRIAXone   IVPB        Cardiovascular:    Pulmonary:    Hematalogic:  aspirin  chewable 81 milliGRAM(s) Oral daily  heparin  Injectable 5000 Unit(s) SubCutaneous every 8 hours    Other:  acetaminophen  Suppository 650 milliGRAM(s) Rectal every 6 hours PRN  ascorbic acid 500 milliGRAM(s) Oral daily  atorvastatin 40 milliGRAM(s) Oral at bedtime  dexmedetomidine Infusion 0.2 MICROgram(s)/kG/Hr IV Continuous <Continuous>  ergocalciferol 89939 Unit(s) Oral <User Schedule>  fentaNYL    Injectable 25 MICROGram(s) IV Push every 4 hours  ferrous    sulfate Liquid 300 milliGRAM(s) Enteral Tube daily  folic acid 1 milliGRAM(s) Oral daily  HYDROmorphone  Injectable 1 milliGRAM(s) IV Push every 4 hours PRN  multivitamin 1 Tablet(s) Oral daily  pantoprazole   Suspension 40 milliGRAM(s) Oral daily  propylthiouracil 50 milliGRAM(s) Oral daily    acetaminophen  Suppository 650 milliGRAM(s) Rectal every 6 hours PRN  ascorbic acid 500 milliGRAM(s) Oral daily  aspirin  chewable 81 milliGRAM(s) Oral daily  atorvastatin 40 milliGRAM(s) Oral at bedtime  cefTRIAXone   IVPB 2 Gram(s) IV Intermittent every 24 hours  cefTRIAXone   IVPB      dexmedetomidine Infusion 0.2 MICROgram(s)/kG/Hr IV Continuous <Continuous>  ergocalciferol 60110 Unit(s) Oral <User Schedule>  fentaNYL    Injectable 25 MICROGram(s) IV Push every 4 hours  ferrous    sulfate Liquid 300 milliGRAM(s) Enteral Tube daily  folic acid 1 milliGRAM(s) Oral daily  heparin  Injectable 5000 Unit(s) SubCutaneous every 8 hours  HYDROmorphone  Injectable 1 milliGRAM(s) IV Push every 4 hours PRN  multivitamin 1 Tablet(s) Oral daily  pantoprazole   Suspension 40 milliGRAM(s) Oral daily  propylthiouracil 50 milliGRAM(s) Oral daily    Drug Dosing Weight  Height (cm): 144.78 (28 Nov 2017 18:44)  Weight (kg): 46.7 (28 Nov 2017 18:44)  BMI (kg/m2): 22.3 (28 Nov 2017 18:44)  BSA (m2): 1.36 (28 Nov 2017 18:44)    CENTRAL LINE: [X] YES [] NO  LOCATION: [facility]  DATE INSERTED: 11/29  REMOVE: [] YES [x] NO  EXPLAIN: Possible pressor support, hypotensive overnight    VOSS: [x] YES [] NO    DATE INSERTED:  REMOVE:  [] YES [] NO  EXPLAIN:    A-LINE:  NO    PMH -reviewed admission note, no change since admission  PAST MEDICAL & SURGICAL HISTORY:  Psoriasis  Dementia  No significant past surgical history      ICU Vital Signs Last 24 Hrs  T(C): 37.1 (02 Dec 2017 23:30), Max: 37.7 (02 Dec 2017 05:00)  T(F): 98.7 (02 Dec 2017 23:30), Max: 99.8 (02 Dec 2017 05:00)  HR: 57 (03 Dec 2017 02:00) (54 - 88)  BP: 112/92 (03 Dec 2017 02:00) (90/36 - 171/75)  BP(mean): 97 (03 Dec 2017 02:00) (49 - 101)  ABP: --  ABP(mean): --  RR: 19 (03 Dec 2017 02:00) (17 - 41)  SpO2: 94% (03 Dec 2017 02:00) (94% - 100%)      ABG - ( 02 Dec 2017 04:52 )  pH: 7.43  /  pCO2: 44    /  pO2: 102   / HCO3: 29    / Base Excess: 4.7   /  SaO2: 98                    12-01 @ 07:01  -  12-02 @ 07:00  --------------------------------------------------------  IN: 7191.2 mL / OUT: 1745 mL / NET: 5446.2 mL        Mode: AC/ CMV (Assist Control/ Continuous Mandatory Ventilation)  RR (machine): 16  TV (machine): 400  FiO2: 40  PEEP: 5  ITime: 1  MAP: 9  PIP: 29      PHYSICAL EXAM:    GENERAL: [x]NAD, []well-groomed, []well-developed  HEAD:  [x]Atraumatic, []Normocephalic  EYES: []EOMI, []PERRLA, [x]conjunctiva and sclera clear  ENMT: []No tonsillar erythema, exudates, or enlargement; [x]Moist mucous membranes, []Good dentition, []No lesions  NECK: []Supple, normal appearance, [x]No JVD; []Normal thyroid; []Trachea midline  NERVOUS SYSTEM:  [x]Intubated and sedated, []Good concentration; []Motor Strength 5/5 B/L upper and lower extremities; []DTRs 2+ intact and symmetric  CHEST/LUNG: [x]No chest deformity; []Normal percussion bilaterally; []No rales, rhonchi, wheezing   HEART: [x]Regular rate and rhythm; []No murmurs, rubs, or gallops  ABDOMEN: [x]Soft, Nontender, Nondistended; []Bowel sounds present  EXTREMITIES:  []2+ Peripheral Pulses, [x]No clubbing, cyanosis, or edema  LYMPH: [x]No lymphadenopathy noted  SKIN: [x]No rashes or lesions; []Good capillary refill      LABS:  CBC Full  -  ( 02 Dec 2017 07:36 )  WBC Count : 24.2 K/uL  Hemoglobin : 10.6 g/dL  Hematocrit : 34.8 %  Platelet Count - Automated : 140 K/uL  Mean Cell Volume : 103.4 fl  Mean Cell Hemoglobin : 31.7 pg  Mean Cell Hemoglobin Concentration : 30.6 gm/dL  Auto Neutrophil # : x  Auto Lymphocyte # : x  Auto Monocyte # : x  Auto Eosinophil # : x  Auto Basophil # : x  Auto Neutrophil % : x  Auto Lymphocyte % : x  Auto Monocyte % : x  Auto Eosinophil % : x  Auto Basophil % : x    12-02    148<H>  |  112<H>  |  12  ----------------------------<  144<H>  3.6   |  31  |  0.28<L>    Ca    7.8<L>      02 Dec 2017 17:55  Phos  2.5     12-02  Mg     1.8     12-02          Culture Results:   No growth (11-30 @ 11:55)  Culture Results:   No growth to date. (11-30 @ 10:13)  Culture Results:   No growth to date. (11-30 @ 10:13)      RADIOLOGY & ADDITIONAL STUDIES REVIEWED:  ***    []GOALS OF CARE DISCUSSION WITH PATIENT/FAMILY/PROXY:    CRITICAL CARE TIME SPENT: 35 minutes    Assessment and Plan:   · Assessment		  Patient was intubated in the ED, tachycardic to 110-130's in the ED, BP improved to 156/72 after starting levophed,  No leucocytosis, but has a left shift, BMP shows hypernatremia to 151, elevated BUN to 45, lactate is 3.6. MCV elevated to 104.1.   ABG on s/p intubation on FiO2 of 100%, TV: 400, PEEP of 5 is pH: 7.22, PaCO2: 66, PaO2: 95, Bicarb: 26, O2 Sat of 100%  s/p 2L NS bolus and vanc and zosyn in the ED  CXR: Patchy right perihilar opacities, which may be due to pneumonia. Mild diffuse interstitial prominence. Small bilateral pleural effusions.  EKG - LVH , LAD , incomplete LBBB, QRS     Admitted to ICU for hypoxic and hypercapnia respiratory failure and septic shock requiring pressors 2/2 PNA found to be bacteremic with H. influenza           Problem/Plan - 1:  ·  Problem: Septic shock.  Plan: 2/2 RLL pneumonia, found to be bacteremic 2/2 H. influenzae (gram neg.)  no leucocytosis, afebrile overnight Tmax 100.2  c/w fentanyl, precedex , titrate for RASS   c/w phenylephrine , titrate for MAP of 65  lactate trended down to 1.7 on 11/30  RVP positive  BCx positive for gram negative coccobacillus - Haemophilus influenzae, c/w zosyn per ID recommendation, awaiting sensitivity  Ucx negative to date  CVP low 12/1, gave another 2-3 L bolus of lactate due to low CVP of 8- Improved  d/c LR, d/c'ed pressors.      Problem/Plan - 2:  ·  Problem: Respiratory failure.  Plan: hypoxic and hypercapnia respiratory failure 2/2 RLL pneumonia, found to be bacteremic with H. influenza, RVP positive  s/p intubation  c/w fentanyl 25mcg q4h   c/w precedex.      Problem/Plan - 3:  ·  Problem: EKG abnormalities.  Plan: EKG - LVH , LAD , incomplete LBBB, QRS   T1 T2 elevated at 1.5, trended down T3 to 1.03  monitor on tele  c/w aspirin , statin through NG tube   no BB due to hypotension  pt had acute onset of Afib-given one dose amiodarone and converted back to sinus rhythm  f/u repeat echo  f/u cardio consult = Dr. Garrison.      Problem/Plan - 4:  ·  Problem: Hypernatremia.  Plan: Na 147  s/p 1 bolus LR  c/w 250ml FW q6, c/w tube feeds.      Problem/Plan - 5:  ·  Problem: Prophylactic measure.  Plan: c/w GI ppx and DVT as ordered  IMPROVE - 2.

## 2017-12-03 NOTE — PROGRESS NOTE ADULT - SUBJECTIVE AND OBJECTIVE BOX
Patient is seen and examined at the bed side,  remains afebrile.  She remains intubated and  off pressor. The WBC count stay elevated.          REVIEW OF SYSTEMS: Unable to obtain since intubated/sedated          ICU Vital Signs Last 24 Hrs  T(C): 36.8 (03 Dec 2017 16:00), Max: 37.1 (02 Dec 2017 23:30)  T(F): 98.3 (03 Dec 2017 16:00), Max: 98.7 (02 Dec 2017 23:30)  HR: 63 (03 Dec 2017 17:02) (50 - 86)  BP: 100/39 (03 Dec 2017 17:00) (90/36 - 133/53)  BP(mean): 54 (03 Dec 2017 17:00) (46 - 97)  ABP: --  ABP(mean): --  RR: 18 (03 Dec 2017 17:00) (16 - 26)  SpO2: 95% (03 Dec 2017 17:02) (93% - 97%)              PHYSICAL EXAM:    GENERAL: Intubated/sedated  HEENT: ET tube in placed  CVS: s1 and s2 present  RESP: Air entry B/L   GI: abdomen soft and nontender  EXT: No pedal  edema, Right hip staples in placed, site looks clean, no  redness  CNS: intubated/sedated              ALLERGIES: NKDA                LABS:                          10.5   25.8  )-----------( 128      ( 03 Dec 2017 07:07 )             33.0                           10.6   24.2  )-----------( 140      ( 02 Dec 2017 07:36 )             34.8                             11.3   22.5  )-----------( 167      ( 01 Dec 2017 06:38 )             36.8                           11.6   18.5  )-----------( 206      ( 2017 13:39 )             38.3         12    146<H>  |  109<H>  |  13  ----------------------------<  135<H>  3.4<L>   |  29  |  0.28<L>    Ca    7.8<L>      03 Dec 2017 07:07  Phos  1.7     12  Mg     1.9     12-03      12-02    148<H>  |  112<H>  |  12  ----------------------------<  144<H>  3.6   |  31  |  0.28<L>    Ca    7.8<L>      02 Dec 2017 17:55  Phos  2.5       Mg     1.8           TPro  4.8<L>  /  Alb  1.7<L>  /  TBili  0.8  /  DBili  x   /  AST  39  /  ALT  29  /  AlkPhos  119          PT/INR - ( 2017 11:02 )   PT: 17.7 sec;   INR: 1.61 ratio         PTT - ( 2017 11:02 )  PTT:41.2 sec  Urinalysis Basic - ( 2017 16:48 )    Color: Yellow / Appearance: Slightly Turbid / S.020 / pH: x  Gluc: x / Ketone: Small  / Bili: Negative / Urobili: 4   Blood: x / Protein: 100 / Nitrite: Negative   Leuk Esterase: Negative / RBC: 5-10 /HPF / WBC 3-5 /HPF   Sq Epi: x / Non Sq Epi: Moderate /HPF / Bacteria: Many /HPF              ABG - ( 2017 05:30 )  pH: 7.38  /  pCO2: 43    /  pO2: 99    / HCO3: 24    / Base Excess: -0.3  /  SaO2: 98              MEDICATIONS  (STANDING):  albumin human 25% IVPB 100 milliLiter(s) IV Intermittent every 6 hours  ascorbic acid 500 milliGRAM(s) Oral daily  aspirin  chewable 81 milliGRAM(s) Oral daily  atorvastatin 40 milliGRAM(s) Oral at bedtime  cefTRIAXone   IVPB 2 Gram(s) IV Intermittent every 24 hours  cefTRIAXone   IVPB      dexmedetomidine Infusion 0.2 MICROgram(s)/kG/Hr (3 mL/Hr) IV Continuous <Continuous>  ergocalciferol 82712 Unit(s) Oral <User Schedule>  ferrous    sulfate Liquid 300 milliGRAM(s) Enteral Tube daily  folic acid 1 milliGRAM(s) Oral daily  heparin  Injectable 5000 Unit(s) SubCutaneous every 8 hours  multivitamin 1 Tablet(s) Oral daily  pantoprazole   Suspension 40 milliGRAM(s) Oral daily  potassium phosphate IVPB 15 milliMole(s) IV Intermittent every 6 hours  propylthiouracil 50 milliGRAM(s) Oral daily    MEDICATIONS  (PRN):  acetaminophen  Suppository 650 milliGRAM(s) Rectal every 6 hours PRN For Temp greater than 38 C (100.4 F)  HYDROmorphone  Injectable 1 milliGRAM(s) IV Push every 4 hours PRN Severe Pain (7 - 10)                    RADIOLOGY & ADDITIONAL TESTS:      17 : Xray Chest 1 View AP -PORTABLE-Routine (17 @ 09:34) : No change in small bilateral pleural effusions and right perihilar airspace disease.        17 : Xray Chest 1 View AP- PORTABLE-Urgent (17 @ 11:51) : Enteric tube has been advanced, below the left hemidiaphragm, tip collimated off the field-of-view. ET tube and left IJ catheter remain in  place. No pneumothorax. Persistent right midlung opacity. Mild diffuse interstitial prominence.  Small bilateral pleural effusions.          17 : Xray Chest 1 View AP/PA (17 @ 05:48) : Increased lung volumes consistent with COPD. Bones are again quite  demineralized. Endotracheal tube is in good position. Nasogastric tube courses into the abdominal area off the lower edge of the film. Heart is grossly normal in size.  Significant infiltrates are seen again bilaterally but most pronounced in the right perihilar area.      17 : Xray Chest 1 View AP -PORTABLE-Routine (17 @ 16:28) : Patchy right perihilar opacities, which may be due to pneumonia. Mild  diffuse interstitial prominence. Small bilateral pleural effusions.            MICROBIOLOGY DATA:  Culture - Blood (17 @ 00:47)    -  Haemophilus influenzae: Detec    Gram Stain:   Growth in anaerobic bottle:  Gram Negative Coccobacilli    -  Ampicillin: S    -  Ceftriaxone: S    -  Chloramphenicol: S    -  Levofloxacin: S    -  Meropenem: S    Specimen Source: .Blood Blood-Venous    Organism: Blood Culture PCR    Organism: Haemophilus influenzae    Culture Results:   Growth in anaerobic bottle: Haemophilus influenzae  ***Blood Panel PCR results on this specimen are available  approximately 3 hours after the Gram stain result.***  Gram stain, PCR, and/or culture results may not always  correspond due to difference in methodologies.  ************************************************************  This PCR assay was performed using [employer].  The following targets are tested for: Enterococcus,  vancomycin resistant enterococci, Listeria monocytogenes,  coagulase negative staphylococci, S. aureus,  methicillin resistant S. aureus, Streptococcus agalactiae  (Group B), S. pneumoniae, S. pyogenes (Group A),  Acinetobacter baumannii, Enterobacter cloacae, E. coli,  Klebsiella oxytoca, K. pneumoniae, Proteus sp.,  Serratia marcescens, Haemophilus influenzae,  Neisseria meningitidis, Pseudomonas aeruginosa, Candida  albicans, C. glabrata, C krusei, C parapsilosis,  C. tropicalis and the KPC resistance gene.    Organism Identification: Blood Culture PCR  Haemophilus influenzae    Method Type: PCR    Method Type: KB          Culture - Sputum . (17 @ 11:55)    Gram Stain:   Moderate polymorphonuclear leukocytes per low power field  Rare Squamous epithelial cells per low power field  Rare Gram Positive Cocci per oil power field    Specimen Source: .Sputum Sputum - trap    Culture Results:   No growth      Culture - Blood (17 @ 10:13)    Specimen Source: .Blood Blood-Peripheral    Culture Results:   No growth to date.    Culture - Blood (17 @ 10:13)    Specimen Source: .Blood Blood-Peripheral    Culture Results:   No growth to date.        Legionella pneumophila Antigen, Urine (17 @ 17:17)    Legionella Antigen, Urine: Negative            Culture - Blood (17 @ 00:47)    Specimen Source: .Blood Blood-Peripheral    Culture Results:   No growth to date.      Culture - Urine (17 @ 00:19)    Specimen Source: .Urine Catheterized    Culture Results:   No growth            Rapid Respiratory Viral Panel (17 @ 19:15)    Rapid RVP Result: Detected: The [employer] RVP Rapid uses polymerase chain reaction (PCR) and melt  curve analysis to screen for adenovirus; coronavirus HKU1, NL63, 229E,  OC43; human metapneumovirus (hMPV); human enterovirus/rhinovirus  (Entero/RV); influenza A; influenza A/H1;influenza A/H3; influenza  A/H1-2009; influenza B; parainfluenza viruses 1, 2, 3, 4; respiratory  syncytial virus; Bordetella pertussis; Mycoplasma pneumoniae; and  Chlamydophila pneumoniae.

## 2017-12-03 NOTE — PROGRESS NOTE ADULT - ASSESSMENT
A 89 yo Female with right femur neck fx, difficulty walking,  brought in to ER by EMS from [facility] for  evaluation of  difficulty of breathing. In the ER, she found to be tachycardic, tachypneic up to 50's, hypotensive to SBP of 50's and lethargic and required intubation. She also has leukocytosis and chest xray shows right sided pneumonia. She has started on Levaquin, Vancomycin and zosyn, cultures pending. The ID consult requested to assist with further evaluation and antibiotic  management.       # Septic shock- on pressor  # Right sided pneumonia  # RSV  # Bacteremia-  Haemophilus influenzae  # Repeat Blood culture- No growth to date 11/30/17      Would recommend:  1. Monitor WBC count. is worsening  2. Continue Ceftriaxone since its sensitive to  Haemophilus influenzae  3. Consider CT scan of abdomen /pelvis including right HIp   4. Management of vent. as per ICU protocol  5. Supportive care for RSV and Isolation as per Hospital policy      d/w ICU team    will follow the patient with you A 88 yo Female with right femur neck fx, difficulty walking,  brought in to ER by EMS from [facility] for  evaluation of  difficulty of breathing. In the ER, she found to be tachycardic, tachypneic up to 50's, hypotensive to SBP of 50's and lethargic and required intubation. She also has leukocytosis and chest xray shows right sided pneumonia. She has started on Levaquin, Vancomycin and zosyn, cultures pending. The ID consult requested to assist with further evaluation and antibiotic  management.       # Septic shock- off pressor  # Right sided pneumonia  # RSV  # Bacteremia-  Haemophilus influenzae  # Repeat Blood culture- No growth to date 11/30/17      Would recommend:  1. Monitor WBC count. is worsening  2. Continue Ceftriaxone since its sensitive to  Haemophilus influenzae  3. Consider CT scan of abdomen /pelvis including right HIp   4. Management of vent. as per ICU protocol  5. Supportive care for RSV and Isolation as per Hospital policy      d/w ICU team    will follow the patient with you

## 2017-12-04 NOTE — PROGRESS NOTE ADULT - PROBLEM SELECTOR PLAN 4
Patient remains sedated/intubated, with no identified surrogate/representative. 2 PC DNR previously completed. Recommend 2 PC MOLST form.

## 2017-12-04 NOTE — PROGRESS NOTE ADULT - PROBLEM SELECTOR PLAN 5
c/w GI ppx and DVT as ordered  IMPROVE - 2 IMPROVE score 2 2/2 age and immobilization - DVT PPx w/ HSQ  - GI PPx w/ Protonix Resolved

## 2017-12-04 NOTE — PROGRESS NOTE ADULT - ASSESSMENT
A 88 yo Female with right femur neck fx, difficulty walking,  brought in to ER by EMS from [facility] for  evaluation of  difficulty of breathing. In the ER, she found to be tachycardic, tachypneic up to 50's, hypotensive to SBP of 50's and lethargic and required intubation. She also has leukocytosis and chest xray shows right sided pneumonia. She has started on Levaquin, Vancomycin and zosyn, cultures pending. The ID consult requested to assist with further evaluation and antibiotic  management.       # Septic shock- on pressor  # Right sided pneumonia  # RSV  # Bacteremia-  Haemophilus influenzae  # Repeat Blood culture- No growth to date 11/30/17      Would recommend:  1.  Consider CT scan of abdomen /pelvis including right HIp   2. Monitor WBC count. is trending down  3. Continue Ceftriaxone since its sensitive to  Haemophilus influenzae  4. Management of vent. as per ICU protocol  5. Supportive care for RSV and Isolation as per Hospital policy      d/w ICU team    will follow the patient with you A 89 yo Female with right femur neck fx, difficulty walking,  brought in to ER by EMS from [facility] for  evaluation of  difficulty of breathing. In the ER, she found to be tachycardic, tachypneic up to 50's, hypotensive to SBP of 50's and lethargic and required intubation. She also has leukocytosis and chest xray shows right sided pneumonia. She has started on Levaquin, Vancomycin and zosyn, cultures pending. The ID consult requested to assist with further evaluation and antibiotic  management.       # Septic shock- off pressor  # Right sided pneumonia  # RSV  # Bacteremia-  Haemophilus influenzae  # Repeat Blood culture- No growth to date 11/30/17      Would recommend:  1.  Consider CT scan of abdomen /pelvis including right HIp   2. Monitor WBC count. is trending down  3. Continue Ceftriaxone since its sensitive to  Haemophilus influenzae  4. Management of vent. as per ICU protocol  5. Supportive care for RSV and Isolation as per Hospital policy      d/w ICU team    will follow the patient with you

## 2017-12-04 NOTE — PROGRESS NOTE ADULT - SUBJECTIVE AND OBJECTIVE BOX
OVERNIGHT EVENTS:  failed SBT yesterday    Present Symptoms:   Review of Systems: pt intubated/sedated - unable to obtain    MEDICATIONS  (STANDING):  ascorbic acid 500 milliGRAM(s) Oral daily  aspirin  chewable 81 milliGRAM(s) Oral daily  atorvastatin 40 milliGRAM(s) Oral at bedtime  cefTRIAXone   IVPB 2 Gram(s) IV Intermittent every 24 hours  cefTRIAXone   IVPB      dexmedetomidine Infusion 0.2 MICROgram(s)/kG/Hr (3 mL/Hr) IV Continuous <Continuous>  ergocalciferol 67339 Unit(s) Oral <User Schedule>  ferrous    sulfate Liquid 300 milliGRAM(s) Enteral Tube daily  folic acid 1 milliGRAM(s) Oral daily  heparin  Injectable 5000 Unit(s) SubCutaneous every 8 hours  multivitamin 1 Tablet(s) Oral daily  pantoprazole   Suspension 40 milliGRAM(s) Oral daily  propylthiouracil 50 milliGRAM(s) Oral daily    MEDICATIONS  (PRN):  acetaminophen  Suppository 650 milliGRAM(s) Rectal every 6 hours PRN For Temp greater than 38 C (100.4 F)  HYDROmorphone  Injectable 1 milliGRAM(s) IV Push every 4 hours PRN Severe Pain (7 - 10)      PHYSICAL EXAM:  Vital Signs Last 24 Hrs  T(C): 36.9 (04 Dec 2017 07:00), Max: 37.4 (03 Dec 2017 23:53)  T(F): 98.4 (04 Dec 2017 07:00), Max: 99.3 (03 Dec 2017 23:53)  HR: 103 (04 Dec 2017 10:00) (50 - 103)  BP: 151/89 (04 Dec 2017 10:00) (96/41 - 151/89)  BP(mean): 102 (04 Dec 2017 10:00) (53 - 102)  RR: 14 (04 Dec 2017 10:00) (14 - 26)  SpO2: 96% (04 Dec 2017 10:00) (95% - 99%)  General: pt sedated and intubated, nonverbal.   Karnofsky Performance Score/Palliative Performance Status Version2:     20%    HEENT: ET tube, NGT   Lungs: pt intubated  CV: normal    GI:   incontinent, NGT  :  morales  Musculoskeletal: nonambulatory/assist x 1 with ADLs at baseline. Pt now bedbound, unable to follow commands   Skin: DTI to the Sacrococcyx Area   Neuro:  cognitive impairment at baseline; eyes open - not oriented. unable to follow commands  Oral intake ability: unable/only mouth care   Diet: NPO-NGT    LABS:                          9.6    20.8  )-----------( 136      ( 04 Dec 2017 06:15 )             31.4     12-04    144  |  107  |  11  ----------------------------<  99  4.0   |  31  |  0.20<L>    Ca    8.2<L>      04 Dec 2017 06:15  Phos  2.1     12-04  Mg     2.0     12-04    TPro  5.3<L>  /  Alb  2.6<L>  /  TBili  0.6  /  DBili  x   /  AST  24  /  ALT  24  /  AlkPhos  65  12-04        RADIOLOGY & ADDITIONAL STUDIES:  < from: Xray Chest 1 View AP -PORTABLE-Routine (12.04.17 @ 06:56) >  EXAM:  CHEST PORTABLE ROUTINE                            PROCEDURE DATE:  12/04/2017          INTERPRETATION:  CLINICAL STATEMENT: Follow-up chest pain.    TECHNIQUE: AP view of the chest.    COMPARISON: 12/3/2017    FINDINGS/  IMPRESSION:  Study limited due to rotation.    ET tube, left central line, feeding tube again noted. No pneumothorax.    Increased interstitial lung markings without significant change. Small   bilateral pleural effusions with adjacent airspace opacities right   greater than left without significant change given differences in   technique    Heart size cannot be accurately assessed in this projection.    < end of copied text >      ADVANCE DIRECTIVES: 2 PC DNR previously completed.

## 2017-12-04 NOTE — PROGRESS NOTE ADULT - ASSESSMENT
89/F from [facility], with PMHx of right femur neck fracture, iron deficiency anemia, Hyperthyroidism ( patient on PTU at home) constipation, difficulty walking, was BIB EMS from NH for c/o difficulty breathing - admitted to ICU for hypoxic and hypercapnia respiratory failure and septic shock requiring pressors 2/2 PNA found to be bacteremic with H. influenza 89 F from [facility] PM Right Femur Neck Fracture, DONELL, Hyperthyroidism (PTU), Constipation p/w difficulty breathing - admitted to ICU for hypoxic and hypercapnia respiratory failure and septic shock requiring pressors 2/2 PNA and bacteremic with H. influenza vs  Acute Systolic CHF EF 25%

## 2017-12-04 NOTE — PROGRESS NOTE ADULT - PROBLEM SELECTOR PLAN 1
2/2 RLL pneumonia, found to be bacteremic 2/2 H. influenzae (gram neg.)  no leucocytosis, afebrile overnight   c/w precedex for sedation  d/flavia pressors, s/p1 L LR for SBP in 90's  RVP positive  legionella - negative, d/c'ed levaquin  BCx positive for gram negative coccobacillus - Haemophilus influenzae,sensitive to ceftriaxone  Will give albumin 25gQ6 for 4 doses  Concern for pulmonary congestion however cannot give lasix 2/2 to low BP 2/2 RLL pneumonia and bacteremic 2/2 H. influenzae  - No Leukocytosis, afebrile overnight   - C/w Precedex and PRN Dilaudid for sedation  d/flavia pressors, s/p1 L LR for SBP in 90's  RVP positive  legionella - negative, d/c'ed levaquin  BCx positive for gram negative coccobacillus - Haemophilus influenzae,sensitive to ceftriaxone  Will give albumin 25gQ6 for 4 doses  Concern for pulmonary congestion however cannot give lasix 2/2 to low BP 2/2 RLL pneumonia and bacteremic 2/2 H. influenzae, +RVP  - No leukocytosis, afebrile overnight, and off pressors  - C/w Precedex and PRN Dilaudid for sedation  - BCx grew H. influenza sensitive to Rocephin  ***Repeat BCx/Sputum 11/30 NGTD  ***Concern for fluid overload; began IV Lasix; monitor UO and AM CXR

## 2017-12-04 NOTE — PROGRESS NOTE ADULT - SUBJECTIVE AND OBJECTIVE BOX
INTERVAL HPI/OVERNIGHT EVENTS: failed SBT yesterday, otherwise no acute events    PRESSORS: NO    ANTIBIOTICS: Rocephin                 DATE STARTED: 12/2    Antimicrobial:  cefTRIAXone   IVPB 2 Gram(s) IV Intermittent every 24 hours  cefTRIAXone   IVPB        Cardiovascular:    Pulmonary:    Hematalogic:  aspirin  chewable 81 milliGRAM(s) Oral daily  heparin  Injectable 5000 Unit(s) SubCutaneous every 8 hours    Other:  acetaminophen  Suppository 650 milliGRAM(s) Rectal every 6 hours PRN  ascorbic acid 500 milliGRAM(s) Oral daily  atorvastatin 40 milliGRAM(s) Oral at bedtime  dexmedetomidine Infusion 0.2 MICROgram(s)/kG/Hr IV Continuous <Continuous>  ergocalciferol 53867 Unit(s) Oral <User Schedule>  ferrous    sulfate Liquid 300 milliGRAM(s) Enteral Tube daily  folic acid 1 milliGRAM(s) Oral daily  HYDROmorphone  Injectable 1 milliGRAM(s) IV Push every 4 hours PRN  multivitamin 1 Tablet(s) Oral daily  pantoprazole   Suspension 40 milliGRAM(s) Oral daily  propylthiouracil 50 milliGRAM(s) Oral daily    acetaminophen  Suppository 650 milliGRAM(s) Rectal every 6 hours PRN  ascorbic acid 500 milliGRAM(s) Oral daily  aspirin  chewable 81 milliGRAM(s) Oral daily  atorvastatin 40 milliGRAM(s) Oral at bedtime  cefTRIAXone   IVPB 2 Gram(s) IV Intermittent every 24 hours  cefTRIAXone   IVPB      dexmedetomidine Infusion 0.2 MICROgram(s)/kG/Hr IV Continuous <Continuous>  ergocalciferol 01763 Unit(s) Oral <User Schedule>  ferrous    sulfate Liquid 300 milliGRAM(s) Enteral Tube daily  folic acid 1 milliGRAM(s) Oral daily  heparin  Injectable 5000 Unit(s) SubCutaneous every 8 hours  HYDROmorphone  Injectable 1 milliGRAM(s) IV Push every 4 hours PRN  multivitamin 1 Tablet(s) Oral daily  pantoprazole   Suspension 40 milliGRAM(s) Oral daily  propylthiouracil 50 milliGRAM(s) Oral daily    Drug Dosing Weight  Height (cm): 144.78 (28 Nov 2017 18:44)  Weight (kg): 46.7 (28 Nov 2017 18:44)  BMI (kg/m2): 22.3 (28 Nov 2017 18:44)  BSA (m2): 1.36 (28 Nov 2017 18:44)    CENTRAL LINE: [x] YES [] NO  LOCATION: [facility]  DATE INSERTED: 11/29  REMOVE: [] YES [x] NO  EXPLAIN: Hypotensive    VOSS: [x] YES [] NO    DATE INSERTED: 11/28  REMOVE:  [] YES [x] NO  EXPLAIN: IOs    A-LINE:  NO    PMH -reviewed admission note, no change since admission  PAST MEDICAL & SURGICAL HISTORY:  Psoriasis  Dementia  No significant past surgical history      ICU Vital Signs Last 24 Hrs  T(C): 37.4 (03 Dec 2017 23:53), Max: 37.4 (03 Dec 2017 23:53)  T(F): 99.3 (03 Dec 2017 23:53), Max: 99.3 (03 Dec 2017 23:53)  HR: 77 (04 Dec 2017 06:00) (50 - 85)  BP: 122/51 (04 Dec 2017 06:00) (91/32 - 127/52)  BP(mean): 69 (04 Dec 2017 06:00) (46 - 70)  ABP: --  ABP(mean): --  RR: 24 (04 Dec 2017 06:00) (16 - 26)  SpO2: 98% (04 Dec 2017 06:00) (94% - 99%)      ABG - ( 04 Dec 2017 05:15 )  pH: 7.48  /  pCO2: 41    /  pO2: 69    / HCO3: 30    / Base Excess: 6.0   /  SaO2: 94                    12-02 @ 07:01  -  12-03 @ 07:00  --------------------------------------------------------  IN: 2896.3 mL / OUT: 941 mL / NET: 1955.3 mL        Mode: AC/ CMV (Assist Control/ Continuous Mandatory Ventilation)  RR (machine): 16  TV (machine): 400  FiO2: 40  PEEP: 5  ITime: 1  MAP: 15  PIP: 35      PHYSICAL EXAM:    GENERAL: [x]NAD, []well-groomed, []well-developed  HEAD:  [x]Atraumatic, []Normocephalic  EYES: [x]EOMI, []PERRLA, []conjunctiva and sclera clear  ENMT: [x]No tonsillar erythema, exudates, or enlargement; []Moist mucous membranes, []Good dentition, []No lesions  NECK: [x]Supple, normal appearance, []No JVD; []Normal thyroid; []Trachea midline  NERVOUS SYSTEM:  [x]Alert & Oriented X0, []Good concentration; []Motor Strength 5/5 B/L upper and lower extremities; []DTRs 2+ intact and symmetric  CHEST/LUNG: [x]No chest deformity; []Normal percussion bilaterally; []No rales, rhonchi, wheezing   HEART: [x]Regular rate and rhythm; []No murmurs, rubs, or gallops  ABDOMEN: [x]Soft, Nontender, Nondistended; []Bowel sounds present  EXTREMITIES:  [x]1+ Peripheral Pulses, []No clubbing, cyanosis, or edema  LYMPH: [x]No lymphadenopathy noted  SKIN: [x]No rashes or lesions; []Good capillary refill      LABS:  CBC Full  -  ( 03 Dec 2017 07:07 )  WBC Count : 25.8 K/uL  Hemoglobin : 10.5 g/dL  Hematocrit : 33.0 %  Platelet Count - Automated : 128 K/uL  Mean Cell Volume : 103.1 fl  Mean Cell Hemoglobin : 32.8 pg  Mean Cell Hemoglobin Concentration : 31.8 gm/dL  Auto Neutrophil # : x  Auto Lymphocyte # : x  Auto Monocyte # : x  Auto Eosinophil # : x  Auto Basophil # : x  Auto Neutrophil % : x  Auto Lymphocyte % : x  Auto Monocyte % : x  Auto Eosinophil % : x  Auto Basophil % : x    12-04    x   |  x   |  11  ----------------------------<  99  x    |  31  |  x     Ca    8.2<L>      04 Dec 2017 06:15  Phos  2.1     12-04  Mg     2.0     12-04    TPro  x   /  Alb  2.6<L>  /  TBili  x   /  DBili  x   /  AST  x   /  ALT  x   /  AlkPhos  x   12-04            RADIOLOGY & ADDITIONAL STUDIES REVIEWED:  NONE    []GOALS OF CARE DISCUSSION WITH PATIENT/FAMILY/PROXY:    CRITICAL CARE TIME SPENT: 35 minutes

## 2017-12-04 NOTE — PROGRESS NOTE ADULT - PROBLEM SELECTOR PLAN 2
FAST 7C. Per NH report, patient nonambulatory, assist x 1 with ADLs, wheel chair bound, incontinent. Pt remains sedated/intubated - with no identified surrogate. 2 PC DNR on file.

## 2017-12-04 NOTE — PROGRESS NOTE ADULT - PROBLEM SELECTOR PLAN 2
hypoxic and hypercapnia respiratory failure 2/2 RLL pneumonia, found to be bacteremic with H. influenza, RVP positive  s/p intubation  c/w precedex  failed SBT in AM Dependent on the ventilator  - Failed SBT p 5 minutes

## 2017-12-04 NOTE — PROGRESS NOTE ADULT - PROBLEM SELECTOR PLAN 4
Na 146  s/p 1 bolus LR  c/w 250ml FW q6, c/w tube feeds Resolved EKG - LVH, LAD, incomplete LBBB, QRS /CE elevated to 1.5 then dropped to 1.0  - Acute onset of Afib; given one dose amiodarone and converted back to sinus rhythm  - C/w ASA and Atorvastatin  Cardiology Dr. Garrison

## 2017-12-04 NOTE — CONSULT NOTE ADULT - SUBJECTIVE AND OBJECTIVE BOX
Time of visit:    CHIEF COMPLAINT: Patient is a 89y old  Female who presents with a chief complaint of respiratory distress (28 Nov 2017 17:02)      HPI:  Patient is a 89/F from [facility], with PMHx of right femur neck fracture, iron deficiency anemia, Hyperthyroidism ( patient on PTU at home) constipation, difficulty walking, was BIB EMS from NH for c/o difficulty breathing. As per ED attending, patient was tachycardic, tachypneic up to 50's, hypotensive to SBP of 50's and lethargic and needed to be intubated in the ER. Patient is Full Code as per NH papers.  In ED pt has CXR showing   Social Hx: cannot obtain at this time.   Surgical Hx and Fhx cannot be obtained at this time  Next of Kin as per NH papers: Mamie Hagan, Daughter, unable to get contact number of pt's daughter, full code (28 Nov 2017 17:02)   Patient seen and examined.     PAST MEDICAL & SURGICAL HISTORY:  Psoriasis  Dementia  No significant past surgical history      Allergies    No Known Allergies    Intolerances        MEDICATIONS  (STANDING):  ascorbic acid 500 milliGRAM(s) Oral daily  aspirin  chewable 81 milliGRAM(s) Oral daily  atorvastatin 40 milliGRAM(s) Oral at bedtime  cefTRIAXone   IVPB 2 Gram(s) IV Intermittent every 24 hours  cefTRIAXone   IVPB      dexmedetomidine Infusion 0.2 MICROgram(s)/kG/Hr (3 mL/Hr) IV Continuous <Continuous>  ergocalciferol 07672 Unit(s) Oral <User Schedule>  ferrous    sulfate Liquid 300 milliGRAM(s) Enteral Tube daily  folic acid 1 milliGRAM(s) Oral daily  heparin  Injectable 5000 Unit(s) SubCutaneous every 8 hours  multivitamin 1 Tablet(s) Oral daily  pantoprazole   Suspension 40 milliGRAM(s) Oral daily  propylthiouracil 50 milliGRAM(s) Oral daily      MEDICATIONS  (PRN):  acetaminophen  Suppository 650 milliGRAM(s) Rectal every 6 hours PRN For Temp greater than 38 C (100.4 F)  HYDROmorphone  Injectable 1 milliGRAM(s) IV Push every 4 hours PRN Severe Pain (7 - 10)   Medications up to date at time of exam.    Medications up to date at time of exam.    FAMILY HISTORY:      SOCIAL HISTORY  Smoking History: [   ] smoking/smoke exposure, [   ] former smoker  Living Condition: [   ] apartment, [   ] private house  Work History:   Travel History: denies recent travel  Illicit Substance Use: denies  Alcohol Use: denies    REVIEW OF SYSTEMS: unable to obtain, pat is sedated and intubatd    CONSTITUTIONAL:  denies fevers, chills, sweats, weight loss    HEENT:  denies diplopia or blurred vision, sore throat or runny nose.    CARDIOVASCULAR:  denies pressure, squeezing, tightness, or heaviness about the chest; no palpitations.    RESPIRATORY:  denies SOB, cough, DUFF, wheezing.    GASTROINTESTINAL:  denies abdominal pain, nausea, vomiting or diarrhea.    GENITOURINARY: denies dysuria, frequency or urgency.    NEUROLOGIC:  denies numbness, tingling, seizures or weakness.    PSYCHIATRIC:  denies disorder of thought or mood.    MSK: denies swelling, redness      PHYSICAL EXAMINATION:    GENERAL: The patient is a well-developed, well-nourished, in no apparent distress.     Vital Signs Last 24 Hrs  T(C): 37.8 (04 Dec 2017 20:00), Max: 37.8 (04 Dec 2017 20:00)  T(F): 100 (04 Dec 2017 20:00), Max: 100 (04 Dec 2017 20:00)  HR: 85 (04 Dec 2017 20:00) (58 - 103)  BP: 120/52 (04 Dec 2017 20:00) (99/39 - 151/89)  BP(mean): 71 (04 Dec 2017 20:00) (50 - 102)  RR: 19 (04 Dec 2017 20:00) (14 - 28)  SpO2: 98% (04 Dec 2017 20:00) (94% - 99%)  Mode: AC/ CMV (Assist Control/ Continuous Mandatory Ventilation)  RR (machine): 16  TV (machine): 400  FiO2: 40  PEEP: 5  ITime: 1  MAP: 10  PIP: 27   (if applicable)    Chest Tube (if applicable)    HEENT: Head is normocephalic and atraumatic. Extraocular muscles are intact. Mucous membranes are moist. +ETT,+NGT    NECK: Supple, no palpable adenopathy.    LUNGS: Clear to auscultation, no wheezing, rales, or rhonchi.    HEART: Regular rate and rhythm without murmur.    ABDOMEN: Soft, nontender, and nondistended.  No hepatosplenomegaly is noted.    EXTREMITIES: Without any cyanosis, clubbing, rash, lesions or edema.    NEUROLOGIC: sedated    SKIN: Warm, dry, good turgor.      LABS:                        9.6    20.8  )-----------( 136      ( 04 Dec 2017 06:15 )             31.4     12-04    144  |  107  |  11  ----------------------------<  99  4.0   |  31  |  0.20<L>    Ca    8.2<L>      04 Dec 2017 06:15  Phos  2.1     12-04  Mg     2.0     12-04    TPro  5.3<L>  /  Alb  2.6<L>  /  TBili  0.6  /  DBili  x   /  AST  24  /  ALT  24  /  AlkPhos  65  12-04        ABG - ( 04 Dec 2017 05:15 )  pH: 7.48  /  pCO2: 41    /  pO2: 69    / HCO3: 30    / Base Excess: 6.0   /  SaO2: 94                              MICROBIOLOGY: (if applicable)    RADIOLOGY & ADDITIONAL STUDIES:  EKG:   CXR:  < from: Xray Chest 1 View AP -PORTABLE-Routine (12.04.17 @ 06:56) >  FINDINGS/  IMPRESSION:  Study limited due to rotation.    ET tube, left central line, feeding tube again noted. No pneumothorax.    Increased interstitial lung markings without significant change. Small   bilateral pleural effusions with adjacent airspace opacities right   greater than left without significant change given differences in   technique    Heart size cannot be accurately assessed in this projection.        < end of copied text >  ECHO:    IMPRESSION: 89y Female PAST MEDICAL & SURGICAL HISTORY:  Psoriasis  Dementia  No significant past surgical history   p/w     This is an 89 yrs old woman with prior medial condition admitted for acute hypoxic resp failure  and septic shock due to pna. She is intubated and now off pressors. Pna  and bacteremia due to H influenza. Pulmo hypetension    Sugg:  -continue vent support and daily SAT/SBR  -continue antibx  -hemodynamic monitoring  dvt/gi prophylaxix

## 2017-12-04 NOTE — PROGRESS NOTE ADULT - PROBLEM SELECTOR PLAN 1
Secondary to severe sepsis/PNA; now off pressors. Pt failed SBT yesterday - remains sedated/intubated. 2 PC DNR previously completed.

## 2017-12-04 NOTE — PROGRESS NOTE ADULT - SUBJECTIVE AND OBJECTIVE BOX
· Subjective and Objective: 	  INTERVAL HPI/OVERNIGHT EVENTS: Events noticed,off pressors    PRESSORS: NO    ANTIBIOTICS: Rocephin                 DATE STARTED: 12/2    Antimicrobial:  cefTRIAXone   IVPB 2 Gram(s) IV Intermittent every 24 hours  cefTRIAXone   IVPB        Cardiovascular:    Pulmonary:    Hematalogic:  aspirin  chewable 81 milliGRAM(s) Oral daily  heparin  Injectable 5000 Unit(s) SubCutaneous every 8 hours    Other:  acetaminophen  Suppository 650 milliGRAM(s) Rectal every 6 hours PRN  ascorbic acid 500 milliGRAM(s) Oral daily  atorvastatin 40 milliGRAM(s) Oral at bedtime  dexmedetomidine Infusion 0.2 MICROgram(s)/kG/Hr IV Continuous <Continuous>  ergocalciferol 85637 Unit(s) Oral <User Schedule>  ferrous    sulfate Liquid 300 milliGRAM(s) Enteral Tube daily  folic acid 1 milliGRAM(s) Oral daily  HYDROmorphone  Injectable 1 milliGRAM(s) IV Push every 4 hours PRN  multivitamin 1 Tablet(s) Oral daily  pantoprazole   Suspension 40 milliGRAM(s) Oral daily  propylthiouracil 50 milliGRAM(s) Oral daily    acetaminophen  Suppository 650 milliGRAM(s) Rectal every 6 hours PRN  ascorbic acid 500 milliGRAM(s) Oral daily  aspirin  chewable 81 milliGRAM(s) Oral daily  atorvastatin 40 milliGRAM(s) Oral at bedtime  cefTRIAXone   IVPB 2 Gram(s) IV Intermittent every 24 hours  cefTRIAXone   IVPB      dexmedetomidine Infusion 0.2 MICROgram(s)/kG/Hr IV Continuous <Continuous>  ergocalciferol 30513 Unit(s) Oral <User Schedule>  ferrous    sulfate Liquid 300 milliGRAM(s) Enteral Tube daily  folic acid 1 milliGRAM(s) Oral daily  heparin  Injectable 5000 Unit(s) SubCutaneous every 8 hours  HYDROmorphone  Injectable 1 milliGRAM(s) IV Push every 4 hours PRN  multivitamin 1 Tablet(s) Oral daily  pantoprazole   Suspension 40 milliGRAM(s) Oral daily  propylthiouracil 50 milliGRAM(s) Oral daily    Drug Dosing Weight  Height (cm): 144.78 (28 Nov 2017 18:44)  Weight (kg): 46.7 (28 Nov 2017 18:44)  BMI (kg/m2): 22.3 (28 Nov 2017 18:44)  BSA (m2): 1.36 (28 Nov 2017 18:44)    CENTRAL LINE: [x] YES [] NO  LOCATION: [facility]  DATE INSERTED: 11/29  REMOVE: [] YES [x] NO  EXPLAIN: Hypotensive    VOSS: [x] YES [] NO    DATE INSERTED: 11/28  REMOVE:  [] YES [x] NO  EXPLAIN: IOs    A-LINE:  NO    PMH -reviewed admission note, no change since admission  PAST MEDICAL & SURGICAL HISTORY:  Psoriasis  Dementia  No significant past surgical history      ICU Vital Signs Last 24 Hrs  T(C): 37.4 (03 Dec 2017 23:53), Max: 37.4 (03 Dec 2017 23:53)  T(F): 99.3 (03 Dec 2017 23:53), Max: 99.3 (03 Dec 2017 23:53)  HR: 77 (04 Dec 2017 06:00) (50 - 85)  BP: 122/51 (04 Dec 2017 06:00) (91/32 - 127/52)  BP(mean): 69 (04 Dec 2017 06:00) (46 - 70)  ABP: --  ABP(mean): --  RR: 24 (04 Dec 2017 06:00) (16 - 26)  SpO2: 98% (04 Dec 2017 06:00) (94% - 99%)      ABG - ( 04 Dec 2017 05:15 )  pH: 7.48  /  pCO2: 41    /  pO2: 69    / HCO3: 30    / Base Excess: 6.0   /  SaO2: 94                    12-02 @ 07:01  -  12-03 @ 07:00  --------------------------------------------------------  IN: 2896.3 mL / OUT: 941 mL / NET: 1955.3 mL        Mode: AC/ CMV (Assist Control/ Continuous Mandatory Ventilation)  RR (machine): 16  TV (machine): 400  FiO2: 40  PEEP: 5  ITime: 1  MAP: 15  PIP: 35      PHYSICAL EXAM:    GENERAL: [x]NAD, []well-groomed, []well-developed  HEAD:  [x]Atraumatic, []Normocephalic  EYES: [x]EOMI, []PERRLA, []conjunctiva and sclera clear  ENMT: [x]No tonsillar erythema, exudates, or enlargement; []Moist mucous membranes, []Good dentition, []No lesions  NECK: [x]Supple, normal appearance, []No JVD; []Normal thyroid; []Trachea midline  NERVOUS SYSTEM:  [x]Alert & Oriented X0, []Good concentration; []Motor Strength 5/5 B/L upper and lower extremities; []DTRs 2+ intact and symmetric  CHEST/LUNG: [x]No chest deformity; []Normal percussion bilaterally; []No rales, rhonchi, wheezing   HEART: [x]Regular rate and rhythm; []No murmurs, rubs, or gallops  ABDOMEN: [x]Soft, Nontender, Nondistended; []Bowel sounds present  EXTREMITIES:  [x]1+ Peripheral Pulses, []No clubbing, cyanosis, or edema  LYMPH: [x]No lymphadenopathy noted  SKIN: [x]No rashes or lesions; []Good capillary refill      LABS:  CBC Full  -  ( 03 Dec 2017 07:07 )  WBC Count : 25.8 K/uL  Hemoglobin : 10.5 g/dL  Hematocrit : 33.0 %  Platelet Count - Automated : 128 K/uL  Mean Cell Volume : 103.1 fl  Mean Cell Hemoglobin : 32.8 pg  Mean Cell Hemoglobin Concentration : 31.8 gm/dL  Auto Neutrophil # : x  Auto Lymphocyte # : x  Auto Monocyte # : x  Auto Eosinophil # : x  Auto Basophil # : x  Auto Neutrophil % : x  Auto Lymphocyte % : x  Auto Monocyte % : x  Auto Eosinophil % : x  Auto Basophil % : x    12-04    x   |  x   |  11  ----------------------------<  99  x    |  31  |  x     Ca    8.2<L>      04 Dec 2017 06:15  Phos  2.1     12-04  Mg     2.0     12-04    TPro  x   /  Alb  2.6<L>  /  TBili  x   /  DBili  x   /  AST  x   /  ALT  x   /  AlkPhos  x   12-04            RADIOLOGY & ADDITIONAL STUDIES REVIEWED:  NONE    []GOALS OF CARE DISCUSSION WITH PATIENT/FAMILY/PROXY:    CRITICAL CARE TIME SPENT: 35 minutes    Assessment and Plan:   · Assessment		  89 F from [facility] Right Femur Neck Fracture, DONELL, Hyperthyroidism (PTU), Constipation p/w difficulty breathing - admitted to ICU for hypoxic and hypercapnia respiratory failure and septic shock requiring pressors 2/2 PNA and bacteremic with H. influenza vs  Acute Systolic CHF EF 25%     Problem/Plan - 1:  ·  Problem: Septic shock.  Plan: 2/2 RLL pneumonia and bacteremic 2/2 H. influenzae  - No Leukocytosis, afebrile overnight   - C/w Precedex and PRN Dilaudid for sedation  d/flavia pressors, s/p1 L LR for SBP in 90's  RVP positive  legionella - negative, d/c'ed levaquin  BCx positive for gram negative coccobacillus - Haemophilus influenzae,sensitive to ceftriaxone  Will give albumin 25gQ6 for 4 doses  Concern for pulmonary congestion however cannot give lasix 2/2 to low BP.      Problem/Plan - 2:  ·  Problem: Respiratory failure.  Plan: hypoxic and hypercapnia respiratory failure 2/2 RLL pneumonia, found to be bacteremic with H. influenza, RVP positive  s/p intubation  c/w precedex  failed SBT in AM.      Problem/Plan - 3:  ·  Problem: EKG abnormalities.  Plan: EKG - LVH, LAD, incomplete LBBB, QRS   - CE elevated to 1.5 then dropped at 1.5   monitor on tele  c/w aspirin , statin through NG tube   pt had acute onset of Afib-given one dose amiodarone and converted back to sinus rhythm  f/u cardio consult = Dr. Garrison. EKG - LVH , LAD , incomplete LBBB, QRS   T1 T2 elevated at 1.5, trended down T3 to 1.03  monitor on tele  c/w aspirin , statin through NG tube   no BB due to hypotension  pt had acute onset of Afib-given one dose amiodarone and converted back to sinus rhythm  f/u cardio consult = Dr. Garrison      Problem/Plan - 4:  ·  Problem: Hypernatremia.  Plan: Resolved. Na 146  s/p 1 bolus LR  c/w 250ml FW q6, c/w tube feeds      Problem/Plan - 5:  ·  Problem: Prophylactic measure.  Plan: IMPROVE score 2 2/2 age and immobilization - DVT PPx w/ HSQ  - GI PPx w/ Protonix. c/w GI ppx and DVT as ordered  IMPROVE - 2

## 2017-12-04 NOTE — PROGRESS NOTE ADULT - PROBLEM SELECTOR PLAN 3
EKG - LVH , LAD , incomplete LBBB, QRS   T1 T2 elevated at 1.5, trended down T3 to 1.03  monitor on tele  c/w aspirin , statin through NG tube   no BB due to hypotension  pt had acute onset of Afib-given one dose amiodarone and converted back to sinus rhythm  f/u cardio consult = Dr. Garrison EKG - LVH, LAD, incomplete LBBB, QRS   - CE elevated to 1.5 then dropped at 1.5   monitor on tele  c/w aspirin , statin through NG tube   pt had acute onset of Afib-given one dose amiodarone and converted back to sinus rhythm  f/u cardio consult = Dr. Garrison Prior TTE 11/8 moderate pulmonary HTN and normal EF  - Repeat TTE shows severe segmental systolic HF w/ EF 25  ***Began IV Lasix, AM CXR showed worsening congestion

## 2017-12-05 NOTE — CONSULT NOTE ADULT - ASSESSMENT
A/P   Bilateral foot Tylomas,  Onychomycosis/Onychocryptosis   Xerosis  H/O right femur neck fracture, iron deficiency anemia, Hyperthyroidism     Patient evaluated and chart reviewed  Performed excisional debridement of hyperkeratotic tissues with scalpel   Performed aseptic debridement of all toenails without complications  Elevate bilateral LE and off load bilateral heel

## 2017-12-05 NOTE — CHART NOTE - NSCHARTNOTEFT_GEN_A_CORE
Patient with no identified surrogate. 2 PC DNR and no Re-intubation on file. DNR form updated and placed in chart.

## 2017-12-05 NOTE — PROGRESS NOTE ADULT - ASSESSMENT
A 88 yo Female with right femur neck fx, difficulty walking,  brought in to ER by EMS from [facility] for  evaluation of  difficulty of breathing. In the ER, she found to be tachycardic, tachypneic up to 50's, hypotensive to SBP of 50's and lethargic and required intubation. She also has leukocytosis and chest xray shows right sided pneumonia. She has started on Levaquin, Vancomycin and zosyn, cultures pending. The ID consult requested to assist with further evaluation and antibiotic  management.       # Septic shock- off pressor  # Right sided pneumonia  # RSV  # Bacteremia-  Haemophilus influenzae  # Repeat Blood culture- No growth to date 11/30/17      Would recommend:  1. CT scan of abdomen /pelvis including right HIp , still spiking fever and WBC count stay elevated on appropriate therapy for Haemophilus bacteremia  2. Monitor WBC count, stay elevated  3. Continue Ceftriaxone until 12/14/17  4.Weaning trial as per ICU protocol  5. Supportive care for RSV and Isolation as per Hospital policy      d/w ICU team    will follow the patient with you

## 2017-12-05 NOTE — PROGRESS NOTE ADULT - PROBLEM SELECTOR PLAN 3
Prior TTE 11/8 moderate pulmonary HTN and normal EF  - Repeat TTE shows severe segmental systolic HF w/ EF 25  ***Began IV Lasix, AM CXR showed worsening congestion

## 2017-12-05 NOTE — CONSULT NOTE ADULT - SUBJECTIVE AND OBJECTIVE BOX
Patient is a 89/F from [facility], with PMHx of right femur neck fracture, iron deficiency anemia, Hyperthyroidism ( patient on PTU at home) constipation, difficulty walking, was BIB EMS from NH for c/o difficulty breathing. Admitted to ICU with septic shock, intubated on vent. Consulted for long, thick toenails and lesions bilateral foot.   Social Hx: cannot obtain at this time.     Review of Systems:  Review of Systems: except as above, not able to get ROS because pt is unresponsive  Other Review of Systems: All other review of systems negative, except as noted in HPI     Review of Systems:  · General	not applicable  · Skin/Breast	not applicable  · Ophthalmologic	not applicable  · ENMT	not applicable  · Respiratory and Thorax	not applicable  · Cardiovascular	not applicable  · Gastrointestinal	not applicable  · Genitourinary	not applicable  · Musculoskeletal	not applicable  · Neurological	not applicable  · Psychiatric	not applicable  · Hematology/Lymphatics	not applicable  · Endocrine	not applicable  · Allergic/Immunologic	not applicable      Allergies and Intolerances:        Allergies:  	No Known Allergies:     Home Medications:   * Patient Currently Takes Medications as of 13-Nov-2017 16:46 documented in Structured Notes  · 	Tylenol 325 mg oral tablet: 2 tab(s) orally every 6 hours, As Needed   · 	folic acid 1 mg oral tablet: 1 tab(s) orally once a day  · 	ascorbic acid 500 mg oral tablet: 1 tab(s) orally 2 times a day  · 	Multiple Vitamins oral tablet: 1 tab(s) orally once a day  · 	docusate sodium 100 mg oral capsule: 1 cap(s) orally 3 times a day  · 	ferrous sulfate 325 mg (65 mg elemental iron) oral tablet: 1 tab(s) orally 3 times a day  · 	enoxaparin: 40 milligram(s) subcutaneous once a day till 11/23/17  · 	propylthiouracil 50 mg oral tablet: 1 tab(s) orally once a day    Patient History:    Past Medical History:  Dementia    Psoriasis.     Past Surgical History:  No significant past surgical history.     Social History:  Social History (marital status, living situation, occupation, tobacco use, alcohol and drug use, and sexual history): unknown     Tobacco Screening:  · Core Measure Site	Yes  · Has the patient used tobacco in the past 30 days?	No      PHYSICAL EXAM:   89 y/o female seen at bedside intubated and on Vent. For long, thick toenails and bilateral foot lesion  Admitted with septic shock due pneumonia     Vital Signs Last 24 Hrs  ICU Vital Signs Last 24 Hrs  T(C): 36.2 (05 Dec 2017 15:31), Max: 38 (05 Dec 2017 10:00)  T(F): 97.1 (05 Dec 2017 15:31), Max: 100.4 (05 Dec 2017 10:00)  HR: 101 (05 Dec 2017 18:00) (56 - 110)  BP: 154/62 (05 Dec 2017 18:00) (83/31 - 162/81)  BP(mean): 85 (05 Dec 2017 18:00) (43 - 130)  ABP: --  ABP(mean): --  RR: 24 (05 Dec 2017 18:00) (16 - 27)  SpO2: 94% (05 Dec 2017 18:00) (82% - 100%)    PE: B/L Foot & Leg  Vascular: DP1/4, PT: 0/4 b/l; Dopper-able pedal pulses bilateral. CFT< 3 sec x 10; TG: warm; edema noted bilaterally. trace pitting edema bilateral LE  Derm: Chronic venous stasis changes with hyperpigmentation noted. Multiple areas with dry, scaly skin. Hyperkeratotic lesions sub 5th MTH bilateral . no drainage, no purulence noted; no fluctuance noted;  no mal odor present.  Toenails are elongated, thickened and dystrophic x 10, with subungual debris  Neuro: Unable to assess   M/S: Dorsally contracted digits 2-5 bilateral, Laterally deviated hallux bilateral.       LABS/DIAGNOSTIC TESTS                            9.9    21.5  )-----------( 154      ( 05 Dec 2017 07:01 )             31.6                               9.6    20.8  )-----------( 136      ( 04 Dec 2017 06:15 )             31.4                             10.5   25.8  )-----------( 128      ( 03 Dec 2017 07:07 )             33.0                           10.6   24.2  )-----------( 140      ( 02 Dec 2017 07:36 )             34.8                   12-05    142  |  106  |  9   ----------------------------<  85  4.2   |  30  |  0.22<L>    Ca    8.2<L>      05 Dec 2017 07:01  Phos  1.9     12-05  Mg     2.0     12-05    TPro  5.3<L>  /  Alb  2.6<L>  /  TBili  0.6  /  DBili  x   /  AST  24  /  ALT  24  /  AlkPhos  65  12-04            PT/INR - ( 29 Nov 2017 11:02 )   PT: 17.7 sec;   INR: 1.61 ratio

## 2017-12-05 NOTE — PROGRESS NOTE ADULT - SUBJECTIVE AND OBJECTIVE BOX
INTERVAL HPI/OVERNIGHT EVENTS: No acute overnight events    PRESSORS: NO    ANTIBIOTICS:  Rocephin                DATE STARTED: 12/2    Antimicrobial:  cefTRIAXone   IVPB 2 Gram(s) IV Intermittent every 24 hours  cefTRIAXone   IVPB        Cardiovascular:    Pulmonary:    Hematalogic:  aspirin  chewable 81 milliGRAM(s) Oral daily  heparin  Injectable 5000 Unit(s) SubCutaneous every 8 hours    Other:  acetaminophen  Suppository 650 milliGRAM(s) Rectal every 6 hours PRN  ascorbic acid 500 milliGRAM(s) Oral daily  atorvastatin 40 milliGRAM(s) Oral at bedtime  dexmedetomidine Infusion 0.2 MICROgram(s)/kG/Hr IV Continuous <Continuous>  ergocalciferol 69436 Unit(s) Oral <User Schedule>  ferrous    sulfate Liquid 300 milliGRAM(s) Enteral Tube daily  folic acid 1 milliGRAM(s) Oral daily  HYDROmorphone  Injectable 1 milliGRAM(s) IV Push every 4 hours PRN  multivitamin 1 Tablet(s) Oral daily  pantoprazole   Suspension 40 milliGRAM(s) Oral daily  potassium phosphate IVPB 15 milliMole(s) IV Intermittent once  propylthiouracil 50 milliGRAM(s) Oral daily    acetaminophen  Suppository 650 milliGRAM(s) Rectal every 6 hours PRN  ascorbic acid 500 milliGRAM(s) Oral daily  aspirin  chewable 81 milliGRAM(s) Oral daily  atorvastatin 40 milliGRAM(s) Oral at bedtime  cefTRIAXone   IVPB 2 Gram(s) IV Intermittent every 24 hours  cefTRIAXone   IVPB      dexmedetomidine Infusion 0.2 MICROgram(s)/kG/Hr IV Continuous <Continuous>  ergocalciferol 30495 Unit(s) Oral <User Schedule>  ferrous    sulfate Liquid 300 milliGRAM(s) Enteral Tube daily  folic acid 1 milliGRAM(s) Oral daily  heparin  Injectable 5000 Unit(s) SubCutaneous every 8 hours  HYDROmorphone  Injectable 1 milliGRAM(s) IV Push every 4 hours PRN  multivitamin 1 Tablet(s) Oral daily  pantoprazole   Suspension 40 milliGRAM(s) Oral daily  potassium phosphate IVPB 15 milliMole(s) IV Intermittent once  propylthiouracil 50 milliGRAM(s) Oral daily    Drug Dosing Weight  Height (cm): 144.78 (28 Nov 2017 18:44)  Weight (kg): 46.7 (28 Nov 2017 18:44)  BMI (kg/m2): 22.3 (28 Nov 2017 18:44)  BSA (m2): 1.36 (28 Nov 2017 18:44)    CENTRAL LINE: [X] YES [] NO  LOCATION: [facility]  DATE INSERTED: 11/29  REMOVE: [] YES [x] NO  EXPLAIN: Sedation/Hypotensive    VOSS: [x] YES [] NO    DATE INSERTED: 11/28  REMOVE:  [] YES [x] NO  EXPLAIN: IOs    A-LINE:  NO    PMH -reviewed admission note, no change since admission  PAST MEDICAL & SURGICAL HISTORY:  Psoriasis  Dementia  No significant past surgical history      ICU Vital Signs Last 24 Hrs  T(C): 37.5 (05 Dec 2017 05:50), Max: 37.8 (04 Dec 2017 20:00)  T(F): 99.5 (05 Dec 2017 05:50), Max: 100 (04 Dec 2017 20:00)  HR: 87 (05 Dec 2017 07:00) (58 - 103)  BP: 139/58 (05 Dec 2017 07:00) (99/39 - 151/89)  BP(mean): 78 (05 Dec 2017 07:00) (50 - 102)  ABP: --  ABP(mean): --  RR: 25 (05 Dec 2017 07:00) (14 - 28)  SpO2: 97% (05 Dec 2017 07:00) (82% - 99%)      ABG - ( 05 Dec 2017 05:03 )  pH: 7.51  /  pCO2: 40    /  pO2: 86    / HCO3: 32    / Base Excess: 8.3   /  SaO2: 97                    12-04 @ 07:01  -  12-05 @ 07:00  --------------------------------------------------------  IN: 1622.5 mL / OUT: 2551 mL / NET: -928.5 mL        Mode: AC/ CMV (Assist Control/ Continuous Mandatory Ventilation)  RR (machine): 16  TV (machine): 400  FiO2: 40  PEEP: 5  ITime: 1  MAP: 14  PIP: 31      PHYSICAL EXAM:    GENERAL: [x]Agitated w/ ETT, []well-groomed, []well-developed  HEAD:  [x]Atraumatic, []Normocephalic  EYES: []EOMI, []PERRLA, [x]conjunctiva and sclera clear  ENMT: [x]No tonsillar erythema, exudates, or enlargement; []Moist mucous membranes, []Good dentition, []No lesions  NECK: [x]Supple, normal appearance, []No JVD; []Normal thyroid; []Trachea midline  NERVOUS SYSTEM:  [x]Sedated, []Good concentration; []Motor Strength 5/5 B/L upper and lower extremities; []DTRs 2+ intact and symmetric  CHEST/LUNG: [x]No chest deformity; []Normal percussion bilaterally; []No rales, rhonchi, wheezing   HEART: [x]Regular rate and rhythm; []No murmurs, rubs, or gallops  ABDOMEN: [x]Soft, Nontender, Nondistended; []Bowel sounds present  EXTREMITIES:  []2+ Peripheral Pulses, [x]No clubbing, cyanosis, or edema  LYMPH: [x]No lymphadenopathy noted  SKIN: [x]No rashes or lesions; []Good capillary refill      LABS:  CBC Full  -  ( 05 Dec 2017 07:01 )  WBC Count : 21.5 K/uL  Hemoglobin : 9.9 g/dL  Hematocrit : 31.6 %  Platelet Count - Automated : 154 K/uL  Mean Cell Volume : 100.5 fl  Mean Cell Hemoglobin : 31.6 pg  Mean Cell Hemoglobin Concentration : 31.4 gm/dL  Auto Neutrophil # : x  Auto Lymphocyte # : x  Auto Monocyte # : x  Auto Eosinophil # : x  Auto Basophil # : x  Auto Neutrophil % : x  Auto Lymphocyte % : x  Auto Monocyte % : x  Auto Eosinophil % : x  Auto Basophil % : x    12-05    142  |  106  |  9   ----------------------------<  85  4.2   |  30  |  0.22<L>    Ca    8.2<L>      05 Dec 2017 07:01  Phos  1.9     12-05  Mg     2.0     12-05    TPro  5.3<L>  /  Alb  2.6<L>  /  TBili  0.6  /  DBili  x   /  AST  24  /  ALT  24  /  AlkPhos  65  12-04            RADIOLOGY & ADDITIONAL STUDIES REVIEWED:  NONE    []GOALS OF CARE DISCUSSION WITH PATIENT/FAMILY/PROXY:    CRITICAL CARE TIME SPENT: 35 minutes

## 2017-12-05 NOTE — PROGRESS NOTE ADULT - PROBLEM SELECTOR PLAN 4
EKG - LVH, LAD, incomplete LBBB, QRS /CE elevated to 1.5 then dropped to 1.0  - Acute onset of Afib; given one dose amiodarone and converted back to sinus rhythm  - C/w ASA and Atorvastatin  Cardiology Dr. Garrison

## 2017-12-05 NOTE — PROGRESS NOTE ADULT - SUBJECTIVE AND OBJECTIVE BOX
Patient is seen and examined at the bed side, spiked fever and currently is afebrile.   She remains intubated but awake and fighting the ET. The WBC count stay elevated.          REVIEW OF SYSTEMS: Unable to obtain since intubated/sedated            ICU Vital Signs Last 24 Hrs  T(C): 36.2 (05 Dec 2017 15:31), Max: 38 (05 Dec 2017 10:00)  T(F): 97.1 (05 Dec 2017 15:31), Max: 100.4 (05 Dec 2017 10:00)  HR: 101 (05 Dec 2017 18:00) (56 - 110)  BP: 154/62 (05 Dec 2017 18:00) (83/31 - 162/81)  BP(mean): 85 (05 Dec 2017 18:00) (43 - 130)  ABP: --  ABP(mean): --  RR: 24 (05 Dec 2017 18:00) (16 - 27)  SpO2: 94% (05 Dec 2017 18:00) (82% - 100%)              PHYSICAL EXAM:    GENERAL: Intubated  HEENT: ET tube in placed  CVS: s1 and s2 present  RESP: Air entry B/L   GI: abdomen distended  EXT: No pedal  edema, Right hip staples in placed, site looks clean, no  redness  CNS: intubated, awake              ALLERGIES: NKDA                LABS:                          9.9    21.5  )-----------( 154      ( 05 Dec 2017 07:01 )             31.6                               9.6    20.8  )-----------( 136      ( 04 Dec 2017 06:15 )             31.4                             10.5   25.8  )-----------( 128      ( 03 Dec 2017 07:07 )             33.0                           10.6   24.2  )-----------( 140      ( 02 Dec 2017 07:36 )             34.8                   12-    142  |  106  |  9   ----------------------------<  85  4.2   |  30  |  0.22<L>    Ca    8.2<L>      05 Dec 2017 07:01  Phos  1.9     12-05  Mg     2.0     12-05    TPro  5.3<L>  /  Alb  2.6<L>  /  TBili  0.6  /  DBili  x   /  AST  24  /  ALT  24  /  AlkPhos  65  12-04            PT/INR - ( 2017 11:02 )   PT: 17.7 sec;   INR: 1.61 ratio         PTT - ( 2017 11:02 )  PTT:41.2 sec  Urinalysis Basic - ( 2017 16:48 )    Color: Yellow / Appearance: Slightly Turbid / S.020 / pH: x  Gluc: x / Ketone: Small  / Bili: Negative / Urobili: 4   Blood: x / Protein: 100 / Nitrite: Negative   Leuk Esterase: Negative / RBC: 5-10 /HPF / WBC 3-5 /HPF   Sq Epi: x / Non Sq Epi: Moderate /HPF / Bacteria: Many /HPF              ABG - ( 2017 05:30 )  pH: 7.38  /  pCO2: 43    /  pO2: 99    / HCO3: 24    / Base Excess: -0.3  /  SaO2: 98            MEDICATIONS  (STANDING):  ascorbic acid 500 milliGRAM(s) Oral daily  aspirin  chewable 81 milliGRAM(s) Oral daily  atorvastatin 40 milliGRAM(s) Oral at bedtime  cefTRIAXone   IVPB 2 Gram(s) IV Intermittent every 24 hours  cefTRIAXone   IVPB      dexmedetomidine Infusion 0.2 MICROgram(s)/kG/Hr (3 mL/Hr) IV Continuous <Continuous>  ergocalciferol 08009 Unit(s) Oral <User Schedule>  ferrous    sulfate Liquid 300 milliGRAM(s) Enteral Tube daily  folic acid 1 milliGRAM(s) Oral daily  furosemide   Injectable 40 milliGRAM(s) IV Push daily  heparin  Injectable 5000 Unit(s) SubCutaneous every 8 hours  multivitamin 1 Tablet(s) Oral daily  pantoprazole   Suspension 40 milliGRAM(s) Oral daily  propylthiouracil 50 milliGRAM(s) Oral daily    MEDICATIONS  (PRN):  acetaminophen    Suspension 650 milliGRAM(s) Oral every 6 hours PRN For Temp greater than 38 C (100.4 F)  HYDROmorphone  Injectable 2 milliGRAM(s) IV Push every 3 hours PRN for agitation                RADIOLOGY & ADDITIONAL TESTS:      17 : Xray Chest 1 View AP -PORTABLE-Routine (17 @ 09:34) : No change in small bilateral pleural effusions and right perihilar airspace disease.        17 : Xray Chest 1 View AP- PORTABLE-Urgent (17 @ 11:51) : Enteric tube has been advanced, below the left hemidiaphragm, tip collimated off the field-of-view. ET tube and left IJ catheter remain in  place. No pneumothorax. Persistent right midlung opacity. Mild diffuse interstitial prominence.  Small bilateral pleural effusions.          17 : Xray Chest 1 View AP/PA (17 @ 05:48) : Increased lung volumes consistent with COPD. Bones are again quite  demineralized. Endotracheal tube is in good position. Nasogastric tube courses into the abdominal area off the lower edge of the film. Heart is grossly normal in size.  Significant infiltrates are seen again bilaterally but most pronounced in the right perihilar area.      17 : Xray Chest 1 View AP -PORTABLE-Routine (17 @ 16:28) : Patchy right perihilar opacities, which may be due to pneumonia. Mild  diffuse interstitial prominence. Small bilateral pleural effusions.            MICROBIOLOGY DATA:  Culture - Blood (17 @ 00:47)    -  Haemophilus influenzae: Detec    Gram Stain:   Growth in anaerobic bottle:  Gram Negative Coccobacilli    -  Ampicillin: S    -  Ceftriaxone: S    -  Chloramphenicol: S    -  Levofloxacin: S    -  Meropenem: S    Specimen Source: .Blood Blood-Venous    Organism: Blood Culture PCR    Organism: Haemophilus influenzae    Culture Results:   Growth in anaerobic bottle: Haemophilus influenzae  ***Blood Panel PCR results on this specimen are available  approximately 3 hours after the Gram stain result.***  Gram stain, PCR, and/or culture results may not always  correspond due to difference in methodologies.  ************************************************************  This PCR assay was performed using [employer].  The following targets are tested for: Enterococcus,  vancomycin resistant enterococci, Listeria monocytogenes,  coagulase negative staphylococci, S. aureus,  methicillin resistant S. aureus, Streptococcus agalactiae  (Group B), S. pneumoniae, S. pyogenes (Group A),  Acinetobacter baumannii, Enterobacter cloacae, E. coli,  Klebsiella oxytoca, K. pneumoniae, Proteus sp.,  Serratia marcescens, Haemophilus influenzae,  Neisseria meningitidis, Pseudomonas aeruginosa, Candida  albicans, C. glabrata, C krusei, C parapsilosis,  C. tropicalis and the KPC resistance gene.    Organism Identification: Blood Culture PCR  Haemophilus influenzae    Method Type: PCR    Method Type: KB          Culture - Sputum . (17 @ 11:55)    Gram Stain:   Moderate polymorphonuclear leukocytes per low power field  Rare Squamous epithelial cells per low power field  Rare Gram Positive Cocci per oil power field    Specimen Source: .Sputum Sputum - trap    Culture Results:   No growth      Culture - Blood (17 @ 10:13)    Specimen Source: .Blood Blood-Peripheral    Culture Results:   No growth to date.    Culture - Blood (17 @ 10:13)    Specimen Source: .Blood Blood-Peripheral    Culture Results:   No growth to date.        Legionella pneumophila Antigen, Urine (17 @ 17:17)    Legionella Antigen, Urine: Negative            Culture - Blood (17 @ 00:47)    Specimen Source: .Blood Blood-Peripheral    Culture Results:   No growth to date.      Culture - Urine (17 @ 00:19)    Specimen Source: .Urine Catheterized    Culture Results:   No growth            Rapid Respiratory Viral Panel (17 @ 19:15)    Rapid RVP Result: Detected: The [employer] RVP Rapid uses polymerase chain reaction (PCR) and melt  curve analysis to screen for adenovirus; coronavirus HKU1, NL63, 229E,  OC43; human metapneumovirus (hMPV); human enterovirus/rhinovirus  (Entero/RV); influenza A; influenza A/H1;influenza A/H3; influenza  A/H1-2009; influenza B; parainfluenza viruses 1, 2, 3, 4; respiratory  syncytial virus; Bordetella pertussis; Mycoplasma pneumoniae; and  Chlamydophila pneumoniae.

## 2017-12-05 NOTE — PROGRESS NOTE ADULT - SUBJECTIVE AND OBJECTIVE BOX
Time of Visit:  Patient seen and examined.     MEDICATIONS  (STANDING):  ascorbic acid 500 milliGRAM(s) Oral daily  aspirin  chewable 81 milliGRAM(s) Oral daily  atorvastatin 40 milliGRAM(s) Oral at bedtime  cefTRIAXone   IVPB 2 Gram(s) IV Intermittent every 24 hours  cefTRIAXone   IVPB      dexmedetomidine Infusion 0.2 MICROgram(s)/kG/Hr (3 mL/Hr) IV Continuous <Continuous>  ergocalciferol 44760 Unit(s) Oral <User Schedule>  ferrous    sulfate Liquid 300 milliGRAM(s) Enteral Tube daily  folic acid 1 milliGRAM(s) Oral daily  furosemide   Injectable 40 milliGRAM(s) IV Push daily  heparin  Injectable 5000 Unit(s) SubCutaneous every 8 hours  multivitamin 1 Tablet(s) Oral daily  pantoprazole   Suspension 40 milliGRAM(s) Oral daily  propylthiouracil 50 milliGRAM(s) Oral daily      MEDICATIONS  (PRN):  acetaminophen    Suspension 650 milliGRAM(s) Oral every 6 hours PRN For Temp greater than 38 C (100.4 F)  HYDROmorphone  Injectable 2 milliGRAM(s) IV Push every 3 hours PRN for agitation       Medications up to date at time of exam.      PHYSICAL EXAMINATION:  Patient has no new complaints.  GENERAL: The patient is a well-developed, well-nourished, in no apparent distress.     Vital Signs Last 24 Hrs  T(C): 36.2 (05 Dec 2017 15:31), Max: 38 (05 Dec 2017 10:00)  T(F): 97.1 (05 Dec 2017 15:31), Max: 100.4 (05 Dec 2017 10:00)  HR: 101 (05 Dec 2017 18:00) (56 - 110)  BP: 154/62 (05 Dec 2017 18:00) (83/31 - 162/81)  BP(mean): 85 (05 Dec 2017 18:00) (43 - 130)  RR: 24 (05 Dec 2017 18:00) (16 - 27)  SpO2: 94% (05 Dec 2017 18:00) (82% - 100%)  Mode: AC/ CMV (Assist Control/ Continuous Mandatory Ventilation)  RR (machine): 16  TV (machine): 400  FiO2: 40  PEEP: 5  ITime: 1  MAP: 14  PIP: 32   (if applicable)    Chest Tube (if applicable)    HEENT: oral intubated    NECK: Supple, no palpable adenopathy.    LUNGS: Clear to auscultation, no wheezing, rales, or rhonchi.    HEART: Regular rate and rhythm without murmur.    ABDOMEN: Soft, nontender, and nondistended.  No hepatosplenomegaly is noted.    EXTREMITIES: Without any cyanosis, clubbing, rash, lesions or edema.    NEUROLOGIC: sedated  SKIN: Warm, dry, good turgor.      LABS:                        9.9    21.5  )-----------( 154      ( 05 Dec 2017 07:01 )             31.6     12-05    142  |  106  |  9   ----------------------------<  85  4.2   |  30  |  0.22<L>    Ca    8.2<L>      05 Dec 2017 07:01  Phos  1.9     12-05  Mg     2.0     12-05    TPro  5.3<L>  /  Alb  2.6<L>  /  TBili  0.6  /  DBili  x   /  AST  24  /  ALT  24  /  AlkPhos  65  12-04        ABG - ( 05 Dec 2017 05:03 )  pH: 7.51  /  pCO2: 40    /  pO2: 86    / HCO3: 32    / Base Excess: 8.3   /  SaO2: 97                              MICROBIOLOGY: (if applicable)    RADIOLOGY & ADDITIONAL STUDIES:  EKG:   CXR:< from: Xray Chest 1 View AP -PORTABLE-Routine (12.05.17 @ 10:01) >  ET tube, feeding tube, left central line again noted. No pneumothorax.    Increased interstitial lung markings without significant change. Small   bilateral pleural effusions with adjacent opacities right greater than   left without significant change given differences in technique    Heart size within normal limits.          < end of copied text >    ECHO:    IMPRESSION: 89y Female PAST MEDICAL & SURGICAL HISTORY:  Psoriasis  Dementia  No significant past surgical history   p/w     IMP:    This is an 89 yrs old woman with prior medial condition admitted for acute hypoxic resp failure  and septic shock due to pna. She is intubated and now off pressors. Pna  and bacteremia due to H influenza. Pulmo hypetension. Failed daily SBT. copious ett secretions    Sugg:  -continue vent support and daily SAT/SBR  -continue antibx  -hemodynamic monitoring-diuresis  -dvt/gi prophylaxix

## 2017-12-05 NOTE — PROGRESS NOTE ADULT - SUBJECTIVE AND OBJECTIVE BOX
· Subjective and Objective: 	  INTERVAL HPI/OVERNIGHT EVENTS: No acute overnight events    PRESSORS: NO    ANTIBIOTICS:  Rocephin                DATE STARTED: 12/2    Antimicrobial:  cefTRIAXone   IVPB 2 Gram(s) IV Intermittent every 24 hours  cefTRIAXone   IVPB        Cardiovascular:    Pulmonary:    Hematalogic:  aspirin  chewable 81 milliGRAM(s) Oral daily  heparin  Injectable 5000 Unit(s) SubCutaneous every 8 hours    Other:  acetaminophen  Suppository 650 milliGRAM(s) Rectal every 6 hours PRN  ascorbic acid 500 milliGRAM(s) Oral daily  atorvastatin 40 milliGRAM(s) Oral at bedtime  dexmedetomidine Infusion 0.2 MICROgram(s)/kG/Hr IV Continuous <Continuous>  ergocalciferol 47744 Unit(s) Oral <User Schedule>  ferrous    sulfate Liquid 300 milliGRAM(s) Enteral Tube daily  folic acid 1 milliGRAM(s) Oral daily  HYDROmorphone  Injectable 1 milliGRAM(s) IV Push every 4 hours PRN  multivitamin 1 Tablet(s) Oral daily  pantoprazole   Suspension 40 milliGRAM(s) Oral daily  potassium phosphate IVPB 15 milliMole(s) IV Intermittent once  propylthiouracil 50 milliGRAM(s) Oral daily    acetaminophen  Suppository 650 milliGRAM(s) Rectal every 6 hours PRN  ascorbic acid 500 milliGRAM(s) Oral daily  aspirin  chewable 81 milliGRAM(s) Oral daily  atorvastatin 40 milliGRAM(s) Oral at bedtime  cefTRIAXone   IVPB 2 Gram(s) IV Intermittent every 24 hours  cefTRIAXone   IVPB      dexmedetomidine Infusion 0.2 MICROgram(s)/kG/Hr IV Continuous <Continuous>  ergocalciferol 03380 Unit(s) Oral <User Schedule>  ferrous    sulfate Liquid 300 milliGRAM(s) Enteral Tube daily  folic acid 1 milliGRAM(s) Oral daily  heparin  Injectable 5000 Unit(s) SubCutaneous every 8 hours  HYDROmorphone  Injectable 1 milliGRAM(s) IV Push every 4 hours PRN  multivitamin 1 Tablet(s) Oral daily  pantoprazole   Suspension 40 milliGRAM(s) Oral daily  potassium phosphate IVPB 15 milliMole(s) IV Intermittent once  propylthiouracil 50 milliGRAM(s) Oral daily    Drug Dosing Weight  Height (cm): 144.78 (28 Nov 2017 18:44)  Weight (kg): 46.7 (28 Nov 2017 18:44)  BMI (kg/m2): 22.3 (28 Nov 2017 18:44)  BSA (m2): 1.36 (28 Nov 2017 18:44)    CENTRAL LINE: [X] YES [] NO  LOCATION: [facility]  DATE INSERTED: 11/29  REMOVE: [] YES [x] NO  EXPLAIN: Sedation/Hypotensive    VOSS: [x] YES [] NO    DATE INSERTED: 11/28  REMOVE:  [] YES [x] NO  EXPLAIN: IOs    A-LINE:  NO    PMH -reviewed admission note, no change since admission  PAST MEDICAL & SURGICAL HISTORY:  Psoriasis  Dementia  No significant past surgical history      ICU Vital Signs Last 24 Hrs  T(C): 37.5 (05 Dec 2017 05:50), Max: 37.8 (04 Dec 2017 20:00)  T(F): 99.5 (05 Dec 2017 05:50), Max: 100 (04 Dec 2017 20:00)  HR: 87 (05 Dec 2017 07:00) (58 - 103)  BP: 139/58 (05 Dec 2017 07:00) (99/39 - 151/89)  BP(mean): 78 (05 Dec 2017 07:00) (50 - 102)  ABP: --  ABP(mean): --  RR: 25 (05 Dec 2017 07:00) (14 - 28)  SpO2: 97% (05 Dec 2017 07:00) (82% - 99%)      ABG - ( 05 Dec 2017 05:03 )  pH: 7.51  /  pCO2: 40    /  pO2: 86    / HCO3: 32    / Base Excess: 8.3   /  SaO2: 97                    12-04 @ 07:01  -  12-05 @ 07:00  --------------------------------------------------------  IN: 1622.5 mL / OUT: 2551 mL / NET: -928.5 mL        Mode: AC/ CMV (Assist Control/ Continuous Mandatory Ventilation)  RR (machine): 16  TV (machine): 400  FiO2: 40  PEEP: 5  ITime: 1  MAP: 14  PIP: 31      PHYSICAL EXAM:    GENERAL: [x]Agitated w/ ETT, []well-groomed, []well-developed  HEAD:  [x]Atraumatic, []Normocephalic  EYES: []EOMI, []PERRLA, [x]conjunctiva and sclera clear  ENMT: [x]No tonsillar erythema, exudates, or enlargement; []Moist mucous membranes, []Good dentition, []No lesions  NECK: [x]Supple, normal appearance, []No JVD; []Normal thyroid; []Trachea midline  NERVOUS SYSTEM:  [x]Sedated, []Good concentration; []Motor Strength 5/5 B/L upper and lower extremities; []DTRs 2+ intact and symmetric  CHEST/LUNG: [x]No chest deformity; []Normal percussion bilaterally; []No rales, rhonchi, wheezing   HEART: [x]Regular rate and rhythm; []No murmurs, rubs, or gallops  ABDOMEN: [x]Soft, Nontender, Nondistended; []Bowel sounds present  EXTREMITIES:  []2+ Peripheral Pulses, [x]No clubbing, cyanosis, or edema  LYMPH: [x]No lymphadenopathy noted  SKIN: [x]No rashes or lesions; []Good capillary refill      LABS:  CBC Full  -  ( 05 Dec 2017 07:01 )  WBC Count : 21.5 K/uL  Hemoglobin : 9.9 g/dL  Hematocrit : 31.6 %  Platelet Count - Automated : 154 K/uL  Mean Cell Volume : 100.5 fl  Mean Cell Hemoglobin : 31.6 pg  Mean Cell Hemoglobin Concentration : 31.4 gm/dL  Auto Neutrophil # : x  Auto Lymphocyte # : x  Auto Monocyte # : x  Auto Eosinophil # : x  Auto Basophil # : x  Auto Neutrophil % : x  Auto Lymphocyte % : x  Auto Monocyte % : x  Auto Eosinophil % : x  Auto Basophil % : x    12-05    142  |  106  |  9   ----------------------------<  85  4.2   |  30  |  0.22<L>    Ca    8.2<L>      05 Dec 2017 07:01  Phos  1.9     12-05  Mg     2.0     12-05    TPro  5.3<L>  /  Alb  2.6<L>  /  TBili  0.6  /  DBili  x   /  AST  24  /  ALT  24  /  AlkPhos  65  12-04            RADIOLOGY & ADDITIONAL STUDIES REVIEWED:  NONE    []GOALS OF CARE DISCUSSION WITH PATIENT/FAMILY/PROXY:    CRITICAL CARE TIME SPENT: 35 minutes    Assessment and Plan:   · Assessment		  89 F from [facility] Right Femur Neck Fracture, DONELL, Hyperthyroidism (PTU), Constipation p/w difficulty breathing - admitted to ICU for hypoxic and hypercapnia respiratory failure and septic shock requiring pressors 2/2 PNA and bacteremic with H. influenza vs  Acute Systolic CHF EF 25%     Problem/Plan - 1:  ·  Problem: Septic shock.  Plan: 2/2 RLL pneumonia and bacteremic 2/2 H. influenzae, +RVP  - No leukocytosis, afebrile overnight, and off pressors  - C/w Precedex and PRN Dilaudid for sedation  - BCx grew H. influenza sensitive to Rocephin  ***Repeat BCx/Sputum 11/30 NGTD  ***Concern for fluid overload; began IV Lasix; monitor UO and AM CXR.      Problem/Plan - 2:  ·  Problem: Respiratory failure.  Plan: Dependent on the ventilator  - Failed SBT p 5 minutes.      Problem/Plan - 3:  ·  Problem: Acute systolic congestive heart failure.  Plan: Prior TTE 11/8 moderate pulmonary HTN and normal EF  - Repeat TTE shows severe segmental systolic HF w/ EF 25  ***Began IV Lasix, AM CXR showed worsening congestion.      Problem/Plan - 4:  ·  Problem: EKG abnormalities.  Plan: EKG - LVH, LAD, incomplete LBBB, QRS /CE elevated to 1.5 then dropped to 1.0  - Acute onset of Afib; given one dose amiodarone and converted back to sinus rhythm  - C/w ASA and Atorvastatin  Cardiology Dr. Garrison.      Problem/Plan - 5:  ·  Problem: Hypernatremia.  Plan: Resolved.      Problem/Plan - 6:  Problem: Prophylactic measure. Plan: IMPROVE score 2 2/2 age and immobilization - DVT PPx w/ HSQ  - GI PPx w/ Protonix.   Problem/Plan - 2:  ·  Problem: Respiratory failure.  Plan: Dependent on the ventilator  - Failed SBT p 5 minutes.      Problem/Plan - 3:  ·  Problem: Acute systolic congestive heart failure.  Plan: Prior TTE 11/8 moderate pulmonary HTN and normal EF  - Repeat TTE shows severe segmental systolic HF w/ EF 25  ***Began IV Lasix, AM CXR showed worsening congestion.      Problem/Plan - 4:  ·  Problem: EKG abnormalities.  Plan: EKG - LVH, LAD, incomplete LBBB, QRS /CE elevated to 1.5 then dropped to 1.0  - Acute onset of Afib; given one dose amiodarone and converted back to sinus rhythm  - C/w ASA and Atorvastatin  Cardiology Dr. Garrison.      Problem/Plan - 5:  ·  Problem: Hypernatremia.  Plan: Resolved.      Problem/Plan - 6:  Problem: Prophylactic measure. Plan: IMPROVE score 2 2/2 age and immobilization - DVT PPx w/ HSQ  - GI PPx w/ Protonix.

## 2017-12-05 NOTE — PROGRESS NOTE ADULT - PROBLEM SELECTOR PLAN 1
2/2 RLL pneumonia and bacteremic 2/2 H. influenzae, +RVP  - No leukocytosis, afebrile overnight, and off pressors  - C/w Precedex and PRN Dilaudid for sedation  - BCx grew H. influenza sensitive to Rocephin  ***Repeat BCx/Sputum 11/30 NGTD  ***Concern for fluid overload; began IV Lasix; monitor UO and AM CXR

## 2017-12-05 NOTE — PROGRESS NOTE ADULT - ASSESSMENT
89 F from [facility] PM Right Femur Neck Fracture, DONELL, Hyperthyroidism (PTU), Constipation p/w difficulty breathing - admitted to ICU for hypoxic and hypercapnia respiratory failure and septic shock requiring pressors 2/2 PNA and bacteremic with H. influenza vs  Acute Systolic CHF EF 25%

## 2017-12-06 NOTE — PROGRESS NOTE ADULT - SUBJECTIVE AND OBJECTIVE BOX
INTERVAL HPI/OVERNIGHT EVENTS: 2PC DNR, no reintubation    PRESSORS: NO    ANTIBIOTICS: ROCEPHIN                 DATE STARTED: 12/2    Antimicrobial:  cefTRIAXone   IVPB 2 Gram(s) IV Intermittent every 24 hours  cefTRIAXone   IVPB        Cardiovascular:  furosemide   Injectable 40 milliGRAM(s) IV Push daily    Pulmonary:    Hematalogic:  aspirin  chewable 81 milliGRAM(s) Oral daily  heparin  Injectable 5000 Unit(s) SubCutaneous every 8 hours    Other:  acetaminophen    Suspension 650 milliGRAM(s) Oral every 6 hours PRN  ascorbic acid 500 milliGRAM(s) Oral daily  atorvastatin 40 milliGRAM(s) Oral at bedtime  dexmedetomidine Infusion 0.2 MICROgram(s)/kG/Hr IV Continuous <Continuous>  ergocalciferol 85936 Unit(s) Oral <User Schedule>  ferrous    sulfate Liquid 300 milliGRAM(s) Enteral Tube daily  folic acid 1 milliGRAM(s) Oral daily  HYDROmorphone  Injectable 2 milliGRAM(s) IV Push every 3 hours PRN  multivitamin 1 Tablet(s) Oral daily  pantoprazole   Suspension 40 milliGRAM(s) Oral daily  propylthiouracil 50 milliGRAM(s) Oral daily    acetaminophen    Suspension 650 milliGRAM(s) Oral every 6 hours PRN  ascorbic acid 500 milliGRAM(s) Oral daily  aspirin  chewable 81 milliGRAM(s) Oral daily  atorvastatin 40 milliGRAM(s) Oral at bedtime  cefTRIAXone   IVPB 2 Gram(s) IV Intermittent every 24 hours  cefTRIAXone   IVPB      dexmedetomidine Infusion 0.2 MICROgram(s)/kG/Hr IV Continuous <Continuous>  ergocalciferol 83411 Unit(s) Oral <User Schedule>  ferrous    sulfate Liquid 300 milliGRAM(s) Enteral Tube daily  folic acid 1 milliGRAM(s) Oral daily  furosemide   Injectable 40 milliGRAM(s) IV Push daily  heparin  Injectable 5000 Unit(s) SubCutaneous every 8 hours  HYDROmorphone  Injectable 2 milliGRAM(s) IV Push every 3 hours PRN  multivitamin 1 Tablet(s) Oral daily  pantoprazole   Suspension 40 milliGRAM(s) Oral daily  propylthiouracil 50 milliGRAM(s) Oral daily    Drug Dosing Weight  Height (cm): 144.78 (28 Nov 2017 18:44)  Weight (kg): 46.7 (28 Nov 2017 18:44)  BMI (kg/m2): 22.3 (28 Nov 2017 18:44)  BSA (m2): 1.36 (28 Nov 2017 18:44)    CENTRAL LINE: [x] YES [] NO  LOCATION: [facility]  DATE INSERTED: 11/29  REMOVE: [] YES [x] NO  EXPLAIN: Possible pressor need    VOSS: [x] YES [] NO    DATE INSERTED: 11/28  REMOVE:  [] YES [x] NO  EXPLAIN: IOs    A-LINE:  NO    PMH -reviewed admission note, no change since admission  PAST MEDICAL & SURGICAL HISTORY:  Psoriasis  Dementia  No significant past surgical history      ICU Vital Signs Last 24 Hrs  T(C): 37.8 (06 Dec 2017 05:00), Max: 38 (05 Dec 2017 10:00)  T(F): 100.1 (06 Dec 2017 05:00), Max: 100.4 (05 Dec 2017 10:00)  HR: 94 (06 Dec 2017 07:00) (56 - 110)  BP: 117/55 (06 Dec 2017 07:00) (83/31 - 162/81)  BP(mean): 70 (06 Dec 2017 07:00) (43 - 130)  ABP: --  ABP(mean): --  RR: 16 (06 Dec 2017 07:00) (16 - 27)  SpO2: 98% (06 Dec 2017 07:00) (92% - 100%)      ABG - ( 06 Dec 2017 04:51 )  pH: 7.43  /  pCO2: 53    /  pO2: 50    / HCO3: 34    / Base Excess: 9.1   /  SaO2: 83                    12-05 @ 07:01  -  12-06 @ 07:00  --------------------------------------------------------  IN: 1410 mL / OUT: 3467 mL / NET: -2057 mL        Mode: AC/ CMV (Assist Control/ Continuous Mandatory Ventilation)  RR (machine): 16  TV (machine): 400  FiO2: 40  PEEP: 5  ITime: 1  MAP: 11  PIP: 29      PHYSICAL EXAM:    GENERAL: [x]Agitated on ventilation, []well-groomed, []well-developed  HEAD:  [x]Atraumatic, []Normocephalic  EYES: []EOMI, []PERRLA, [x]conjunctiva and sclera clear  ENMT: [x]No tonsillar erythema, exudates, or enlargement; [x]Moist mucous membranes, []Good dentition, []No lesions  NECK: []Supple, normal appearance, []No JVD; []Normal thyroid; [x]Trachea midline  NERVOUS SYSTEM:  [x]Intubated, poorly sedated, []Good concentration; []Motor Strength 5/5 B/L upper and lower extremities; []DTRs 2+ intact and symmetric  CHEST/LUNG: [x]No chest deformity; []Normal percussion bilaterally; []No rales, rhonchi, wheezing   HEART: [x]Regular rate and rhythm; []No murmurs, rubs, or gallops  ABDOMEN: []Distended, tender; []Bowel sounds present  EXTREMITIES:  [x]2+ Peripheral Pulses, []No clubbing, cyanosis, or edema  LYMPH: [x]No lymphadenopathy noted  SKIN: [x]No rashes or lesions; []Good capillary refill      LABS:  CBC Full  -  ( 06 Dec 2017 05:45 )  WBC Count : 25.6 K/uL  Hemoglobin : 9.6 g/dL  Hematocrit : 30.9 %  Platelet Count - Automated : 222 K/uL  Mean Cell Volume : 100.6 fl  Mean Cell Hemoglobin : 31.4 pg  Mean Cell Hemoglobin Concentration : 31.2 gm/dL  Auto Neutrophil # : x  Auto Lymphocyte # : x  Auto Monocyte # : x  Auto Eosinophil # : x  Auto Basophil # : x  Auto Neutrophil % : x  Auto Lymphocyte % : x  Auto Monocyte % : x  Auto Eosinophil % : x  Auto Basophil % : x    12-06    140  |  104  |  9   ----------------------------<  98  3.8   |  34<H>  |  0.20<L>    Ca    8.0<L>      06 Dec 2017 05:45  Phos  2.0     12-06  Mg     2.0     12-06              RADIOLOGY & ADDITIONAL STUDIES REVIEWED:  ***    []GOALS OF CARE DISCUSSION WITH PATIENT/FAMILY/PROXY:    CRITICAL CARE TIME SPENT: 35 minutes INTERVAL HPI/OVERNIGHT EVENTS: 2PC DNR, no reintubation    PRESSORS: NO    ANTIBIOTICS: ROCEPHIN                 DATE STARTED: 12/2    Antimicrobial:  cefTRIAXone   IVPB 2 Gram(s) IV Intermittent every 24 hours  cefTRIAXone   IVPB        Cardiovascular:  furosemide   Injectable 40 milliGRAM(s) IV Push daily    Pulmonary:    Hematalogic:  aspirin  chewable 81 milliGRAM(s) Oral daily  heparin  Injectable 5000 Unit(s) SubCutaneous every 8 hours    Other:  acetaminophen    Suspension 650 milliGRAM(s) Oral every 6 hours PRN  ascorbic acid 500 milliGRAM(s) Oral daily  atorvastatin 40 milliGRAM(s) Oral at bedtime  dexmedetomidine Infusion 0.2 MICROgram(s)/kG/Hr IV Continuous <Continuous>  ergocalciferol 44831 Unit(s) Oral <User Schedule>  ferrous    sulfate Liquid 300 milliGRAM(s) Enteral Tube daily  folic acid 1 milliGRAM(s) Oral daily  HYDROmorphone  Injectable 2 milliGRAM(s) IV Push every 3 hours PRN  multivitamin 1 Tablet(s) Oral daily  pantoprazole   Suspension 40 milliGRAM(s) Oral daily  propylthiouracil 50 milliGRAM(s) Oral daily    acetaminophen    Suspension 650 milliGRAM(s) Oral every 6 hours PRN  ascorbic acid 500 milliGRAM(s) Oral daily  aspirin  chewable 81 milliGRAM(s) Oral daily  atorvastatin 40 milliGRAM(s) Oral at bedtime  cefTRIAXone   IVPB 2 Gram(s) IV Intermittent every 24 hours  cefTRIAXone   IVPB      dexmedetomidine Infusion 0.2 MICROgram(s)/kG/Hr IV Continuous <Continuous>  ergocalciferol 67794 Unit(s) Oral <User Schedule>  ferrous    sulfate Liquid 300 milliGRAM(s) Enteral Tube daily  folic acid 1 milliGRAM(s) Oral daily  furosemide   Injectable 40 milliGRAM(s) IV Push daily  heparin  Injectable 5000 Unit(s) SubCutaneous every 8 hours  HYDROmorphone  Injectable 2 milliGRAM(s) IV Push every 3 hours PRN  multivitamin 1 Tablet(s) Oral daily  pantoprazole   Suspension 40 milliGRAM(s) Oral daily  propylthiouracil 50 milliGRAM(s) Oral daily    Drug Dosing Weight  Height (cm): 144.78 (28 Nov 2017 18:44)  Weight (kg): 46.7 (28 Nov 2017 18:44)  BMI (kg/m2): 22.3 (28 Nov 2017 18:44)  BSA (m2): 1.36 (28 Nov 2017 18:44)    CENTRAL LINE: [x] YES [] NO  LOCATION: [facility]  DATE INSERTED: 11/29  REMOVE: [] YES [x] NO  EXPLAIN: Possible pressor need    VOSS: [x] YES [] NO    DATE INSERTED: 11/28  REMOVE:  [] YES [x] NO  EXPLAIN: IOs    A-LINE:  NO    PMH -reviewed admission note, no change since admission  PAST MEDICAL & SURGICAL HISTORY:  Psoriasis  Dementia  No significant past surgical history      ICU Vital Signs Last 24 Hrs  T(C): 37.8 (06 Dec 2017 05:00), Max: 38 (05 Dec 2017 10:00)  T(F): 100.1 (06 Dec 2017 05:00), Max: 100.4 (05 Dec 2017 10:00)  HR: 94 (06 Dec 2017 07:00) (56 - 110)  BP: 117/55 (06 Dec 2017 07:00) (83/31 - 162/81)  BP(mean): 70 (06 Dec 2017 07:00) (43 - 130)  ABP: --  ABP(mean): --  RR: 16 (06 Dec 2017 07:00) (16 - 27)  SpO2: 98% (06 Dec 2017 07:00) (92% - 100%)      ABG - ( 06 Dec 2017 04:51 )  pH: 7.43  /  pCO2: 53    /  pO2: 50    / HCO3: 34    / Base Excess: 9.1   /  SaO2: 83                    12-05 @ 07:01  -  12-06 @ 07:00  --------------------------------------------------------  IN: 1410 mL / OUT: 3467 mL / NET: -2057 mL        Mode: AC/ CMV (Assist Control/ Continuous Mandatory Ventilation)  RR (machine): 16  TV (machine): 400  FiO2: 40  PEEP: 5  ITime: 1  MAP: 11  PIP: 29      PHYSICAL EXAM:    GENERAL: [x]Agitated on ventilation, []well-groomed, []well-developed  HEAD:  [x]Atraumatic, []Normocephalic  EYES: []EOMI, []PERRLA, [x]conjunctiva and sclera clear  ENMT: [x]No tonsillar erythema, exudates, or enlargement; [x]Moist mucous membranes, []Good dentition, []No lesions  NECK: []Supple, normal appearance, []No JVD; []Normal thyroid; [x]Trachea midline  NERVOUS SYSTEM:  [x]Intubated, poorly sedated, []Good concentration; []Motor Strength 5/5 B/L upper and lower extremities; []DTRs 2+ intact and symmetric  CHEST/LUNG: [x]No chest deformity; []Normal percussion bilaterally; []No rales, rhonchi, wheezing   HEART: [x]Regular rate and rhythm; []No murmurs, rubs, or gallops  ABDOMEN: []Distended, tender; []Bowel sounds present  EXTREMITIES:  [x]2+ Peripheral Pulses, []No clubbing, cyanosis, or edema  LYMPH: [x]No lymphadenopathy noted  SKIN: [x]No rashes or lesions; []Good capillary refill      LABS:  CBC Full  -  ( 06 Dec 2017 05:45 )  WBC Count : 25.6 K/uL  Hemoglobin : 9.6 g/dL  Hematocrit : 30.9 %  Platelet Count - Automated : 222 K/uL  Mean Cell Volume : 100.6 fl  Mean Cell Hemoglobin : 31.4 pg  Mean Cell Hemoglobin Concentration : 31.2 gm/dL  Auto Neutrophil # : x  Auto Lymphocyte # : x  Auto Monocyte # : x  Auto Eosinophil # : x  Auto Basophil # : x  Auto Neutrophil % : x  Auto Lymphocyte % : x  Auto Monocyte % : x  Auto Eosinophil % : x  Auto Basophil % : x    12-06    140  |  104  |  9   ----------------------------<  98  3.8   |  34<H>  |  0.20<L>    Ca    8.0<L>      06 Dec 2017 05:45  Phos  2.0     12-06  Mg     2.0     12-06              RADIOLOGY & ADDITIONAL STUDIES REVIEWED:  YES    []GOALS OF CARE DISCUSSION WITH PATIENT/FAMILY/PROXY:    CRITICAL CARE TIME SPENT: 35 minutes

## 2017-12-06 NOTE — PROGRESS NOTE ADULT - PROBLEM SELECTOR PLAN 2
Dependent on the ventilator  - Failed SBT p 5 minutes Unknown cause, BCx NGTD and H. influenza sensitive to Rocephin  ID Garrison  - F/u CT abdomen and RUQ US; contact precautions 2/2 + RSV

## 2017-12-06 NOTE — PROGRESS NOTE ADULT - SUBJECTIVE AND OBJECTIVE BOX
· Subjective and Objective: 	  INTERVAL HPI/OVERNIGHT EVENTS: 2PC DNR, no reintubation    PRESSORS: NO    ANTIBIOTICS: ROCEPHIN                 DATE STARTED: 12/2    Antimicrobial:  cefTRIAXone   IVPB 2 Gram(s) IV Intermittent every 24 hours  cefTRIAXone   IVPB        Cardiovascular:  furosemide   Injectable 40 milliGRAM(s) IV Push daily    Pulmonary:    Hematalogic:  aspirin  chewable 81 milliGRAM(s) Oral daily  heparin  Injectable 5000 Unit(s) SubCutaneous every 8 hours    Other:  acetaminophen    Suspension 650 milliGRAM(s) Oral every 6 hours PRN  ascorbic acid 500 milliGRAM(s) Oral daily  atorvastatin 40 milliGRAM(s) Oral at bedtime  dexmedetomidine Infusion 0.2 MICROgram(s)/kG/Hr IV Continuous <Continuous>  ergocalciferol 17843 Unit(s) Oral <User Schedule>  ferrous    sulfate Liquid 300 milliGRAM(s) Enteral Tube daily  folic acid 1 milliGRAM(s) Oral daily  HYDROmorphone  Injectable 2 milliGRAM(s) IV Push every 3 hours PRN  multivitamin 1 Tablet(s) Oral daily  pantoprazole   Suspension 40 milliGRAM(s) Oral daily  propylthiouracil 50 milliGRAM(s) Oral daily    acetaminophen    Suspension 650 milliGRAM(s) Oral every 6 hours PRN  ascorbic acid 500 milliGRAM(s) Oral daily  aspirin  chewable 81 milliGRAM(s) Oral daily  atorvastatin 40 milliGRAM(s) Oral at bedtime  cefTRIAXone   IVPB 2 Gram(s) IV Intermittent every 24 hours  cefTRIAXone   IVPB      dexmedetomidine Infusion 0.2 MICROgram(s)/kG/Hr IV Continuous <Continuous>  ergocalciferol 82015 Unit(s) Oral <User Schedule>  ferrous    sulfate Liquid 300 milliGRAM(s) Enteral Tube daily  folic acid 1 milliGRAM(s) Oral daily  furosemide   Injectable 40 milliGRAM(s) IV Push daily  heparin  Injectable 5000 Unit(s) SubCutaneous every 8 hours  HYDROmorphone  Injectable 2 milliGRAM(s) IV Push every 3 hours PRN  multivitamin 1 Tablet(s) Oral daily  pantoprazole   Suspension 40 milliGRAM(s) Oral daily  propylthiouracil 50 milliGRAM(s) Oral daily    Drug Dosing Weight  Height (cm): 144.78 (28 Nov 2017 18:44)  Weight (kg): 46.7 (28 Nov 2017 18:44)  BMI (kg/m2): 22.3 (28 Nov 2017 18:44)  BSA (m2): 1.36 (28 Nov 2017 18:44)    CENTRAL LINE: [x] YES [] NO  LOCATION: [facility]  DATE INSERTED: 11/29  REMOVE: [] YES [x] NO  EXPLAIN: Possible pressor need    VOSS: [x] YES [] NO    DATE INSERTED: 11/28  REMOVE:  [] YES [x] NO  EXPLAIN: IOs    A-LINE:  NO    PMH -reviewed admission note, no change since admission  PAST MEDICAL & SURGICAL HISTORY:  Psoriasis  Dementia  No significant past surgical history      ICU Vital Signs Last 24 Hrs  T(C): 37.8 (06 Dec 2017 05:00), Max: 38 (05 Dec 2017 10:00)  T(F): 100.1 (06 Dec 2017 05:00), Max: 100.4 (05 Dec 2017 10:00)  HR: 94 (06 Dec 2017 07:00) (56 - 110)  BP: 117/55 (06 Dec 2017 07:00) (83/31 - 162/81)  BP(mean): 70 (06 Dec 2017 07:00) (43 - 130)  ABP: --  ABP(mean): --  RR: 16 (06 Dec 2017 07:00) (16 - 27)  SpO2: 98% (06 Dec 2017 07:00) (92% - 100%)      ABG - ( 06 Dec 2017 04:51 )  pH: 7.43  /  pCO2: 53    /  pO2: 50    / HCO3: 34    / Base Excess: 9.1   /  SaO2: 83                    12-05 @ 07:01  -  12-06 @ 07:00  --------------------------------------------------------  IN: 1410 mL / OUT: 3467 mL / NET: -2057 mL        Mode: AC/ CMV (Assist Control/ Continuous Mandatory Ventilation)  RR (machine): 16  TV (machine): 400  FiO2: 40  PEEP: 5  ITime: 1  MAP: 11  PIP: 29      PHYSICAL EXAM:    GENERAL: [x]Agitated on ventilation, []well-groomed, []well-developed  HEAD:  [x]Atraumatic, []Normocephalic  EYES: []EOMI, []PERRLA, [x]conjunctiva and sclera clear  ENMT: [x]No tonsillar erythema, exudates, or enlargement; [x]Moist mucous membranes, []Good dentition, []No lesions  NECK: []Supple, normal appearance, []No JVD; []Normal thyroid; [x]Trachea midline  NERVOUS SYSTEM:  [x]Intubated, poorly sedated, []Good concentration; []Motor Strength 5/5 B/L upper and lower extremities; []DTRs 2+ intact and symmetric  CHEST/LUNG: [x]No chest deformity; []Normal percussion bilaterally; []No rales, rhonchi, wheezing   HEART: [x]Regular rate and rhythm; []No murmurs, rubs, or gallops  ABDOMEN: []Distended, tender; []Bowel sounds present  EXTREMITIES:  [x]2+ Peripheral Pulses, []No clubbing, cyanosis, or edema  LYMPH: [x]No lymphadenopathy noted  SKIN: [x]No rashes or lesions; []Good capillary refill      LABS:  CBC Full  -  ( 06 Dec 2017 05:45 )  WBC Count : 25.6 K/uL  Hemoglobin : 9.6 g/dL  Hematocrit : 30.9 %  Platelet Count - Automated : 222 K/uL  Mean Cell Volume : 100.6 fl  Mean Cell Hemoglobin : 31.4 pg  Mean Cell Hemoglobin Concentration : 31.2 gm/dL  Auto Neutrophil # : x  Auto Lymphocyte # : x  Auto Monocyte # : x  Auto Eosinophil # : x  Auto Basophil # : x  Auto Neutrophil % : x  Auto Lymphocyte % : x  Auto Monocyte % : x  Auto Eosinophil % : x  Auto Basophil % : x    12-06    140  |  104  |  9   ----------------------------<  98  3.8   |  34<H>  |  0.20<L>    Ca    8.0<L>      06 Dec 2017 05:45  Phos  2.0     12-06  Mg     2.0     12-06              RADIOLOGY & ADDITIONAL STUDIES REVIEWED:  ***    []GOALS OF CARE DISCUSSION WITH PATIENT/FAMILY/PROXY:    CRITICAL CARE TIME SPENT: 35 minutes    Assessment and Plan:   · Assessment		  89 F from [facility] Right Femur Neck Fracture, DONELL, Hyperthyroidism (PTU), Constipation p/w difficulty breathing - admitted to ICU for hypoxic and hypercapnia respiratory failure and septic shock requiring pressors 2/2 PNA and bacteremic with H. influenza vs  Acute Systolic CHF EF 25%     Problem/Plan - 1:  ·  Problem: Septic shock.  Plan: 2/2 RLL pneumonia and bacteremic 2/2 H. influenzae, +RVP  - No leukocytosis, afebrile overnight, and off pressors  - C/w Precedex and PRN Dilaudid for sedation  - BCx grew H. influenza sensitive to Rocephin  ***Repeat BCx/Sputum 11/30 NGTD  ***Concern for fluid overload; began IV Lasix; monitor UO and AM CXR.      Problem/Plan - 2:  ·  Problem: Respiratory failure.  Plan: Dependent on the ventilator  - Failed SBT p 5 minutes.      Problem/Plan - 3:  ·  Problem: Acute systolic congestive heart failure.  Plan: Prior TTE 11/8 moderate pulmonary HTN and normal EF  - Repeat TTE shows severe segmental systolic HF w/ EF 25  ***Began IV Lasix, AM CXR showed worsening congestion.      Problem/Plan - 4:  ·  Problem: EKG abnormalities.  Plan: EKG - LVH, LAD, incomplete LBBB, QRS /CE elevated to 1.5 then dropped to 1.0  - Acute onset of Afib; given one dose amiodarone and converted back to sinus rhythm  - C/w ASA and Atorvastatin  Cardiology Dr. Garrison.      Problem/Plan - 5:  ·  Problem: Hypernatremia.  Plan: Resolved.      Problem/Plan - 6:  Problem: Prophylactic measure. Plan: IMPROVE score 2 2/2 age and immobilization - DVT PPx w/ HSQ  - GI PPx w/ Protonix.

## 2017-12-06 NOTE — PROGRESS NOTE ADULT - PROBLEM SELECTOR PLAN 3
Prior TTE 11/8 moderate pulmonary HTN and normal EF  - Repeat TTE shows severe segmental systolic HF w/ EF 25  ***Began IV Lasix, AM CXR showed worsening congestion Dependent on the ventilator  - Failed SBT p 5 minutes  - Adjusting sedation

## 2017-12-06 NOTE — CHART NOTE - NSCHARTNOTEFT_GEN_A_CORE
Patient is intubated and sedated,unable to do psych eval at this time.  Please call psychiatrist on call when patient is awake.

## 2017-12-06 NOTE — PROGRESS NOTE ADULT - PROBLEM SELECTOR PLAN 4
EKG - LVH, LAD, incomplete LBBB, QRS /CE elevated to 1.5 then dropped to 1.0  - Acute onset of Afib; given one dose amiodarone and converted back to sinus rhythm  - C/w ASA and Atorvastatin  Cardiology Dr. Garrison Prior TTE 11/8 moderate pulmonary HTN and normal EF  - Repeat TTE shows severe segmental systolic HF w/ EF 25  ***Began IV Lasix, AM CXR showed worsening congestion

## 2017-12-06 NOTE — PROGRESS NOTE ADULT - SUBJECTIVE AND OBJECTIVE BOX
Patient is seen and examined at the bed side, still spiking fever but  currently is afebrile.   She remains intubated and back on pressor . The Leukocytosis is worsening.          REVIEW OF SYSTEMS: Unable to obtain since intubated/sedated            ICU Vital Signs Last 24 Hrs  T(C): 37.9 (06 Dec 2017 17:00), Max: 38.1 (06 Dec 2017 16:01)  T(F): 100.3 (06 Dec 2017 17:00), Max: 100.5 (06 Dec 2017 16:01)  HR: 93 (06 Dec 2017 17:30) (56 - 133)  BP: 132/65 (06 Dec 2017 17:30) (83/30 - 158/90)  BP(mean): 80 (06 Dec 2017 17:30) (42 - 98)  ABP: --  ABP(mean): --  RR: 18 (06 Dec 2017 17:30) (15 - 25)  SpO2: 99% (06 Dec 2017 17:30) (82% - 100%)              PHYSICAL EXAM:    GENERAL: Intubated/sedated  HEENT: ET tube in placed  CVS: s1 and s2 present  RESP: Air entry B/L   GI: abdomen distended  EXT: No pedal  edema, Right hip staples in placed, site looks clean, no  redness  CNS: intubated/sedated              ALLERGIES: NKDA                LABS:                        9.6    25.6  )-----------( 222      ( 06 Dec 2017 05:45 )             30.9                             9.9    21.5  )-----------( 154      ( 05 Dec 2017 07:01 )             31.6                               9.6    20.8  )-----------( 136      ( 04 Dec 2017 06:15 )             31.4                             10.5   25.8  )-----------( 128      ( 03 Dec 2017 07:07 )             33.0                           10.6   24.2  )-----------( 140      ( 02 Dec 2017 07:36 )             34.8                   12-    142  |  106  |  9   ----------------------------<  85  4.2   |  30  |  0.22<L>    Ca    8.2<L>      05 Dec 2017 07:01  Phos  1.9     12-05  Mg     2.0     12-05    TPro  5.3<L>  /  Alb  2.6<L>  /  TBili  0.6  /  DBili  x   /  AST  24  /  ALT  24  /  AlkPhos  65  12-04            PT/INR - ( 2017 11:02 )   PT: 17.7 sec;   INR: 1.61 ratio         PTT - ( 2017 11:02 )  PTT:41.2 sec  Urinalysis Basic - ( 2017 16:48 )    Color: Yellow / Appearance: Slightly Turbid / S.020 / pH: x  Gluc: x / Ketone: Small  / Bili: Negative / Urobili: 4   Blood: x / Protein: 100 / Nitrite: Negative   Leuk Esterase: Negative / RBC: 5-10 /HPF / WBC 3-5 /HPF   Sq Epi: x / Non Sq Epi: Moderate /HPF / Bacteria: Many /HPF              ABG - ( 2017 05:30 )  pH: 7.38  /  pCO2: 43    /  pO2: 99    / HCO3: 24    / Base Excess: -0.3  /  SaO2: 98              MEDICATIONS  (STANDING):  ascorbic acid 500 milliGRAM(s) Oral daily  aspirin  chewable 81 milliGRAM(s) Oral daily  atorvastatin 40 milliGRAM(s) Oral at bedtime  cefTRIAXone   IVPB 2 Gram(s) IV Intermittent every 24 hours  cefTRIAXone   IVPB      dexmedetomidine Infusion 0.2 MICROgram(s)/kG/Hr (3 mL/Hr) IV Continuous <Continuous>  ergocalciferol 69726 Unit(s) Oral <User Schedule>  ferrous    sulfate Liquid 300 milliGRAM(s) Enteral Tube daily  folic acid 1 milliGRAM(s) Oral daily  furosemide   Injectable 40 milliGRAM(s) IV Push daily  heparin  Injectable 5000 Unit(s) SubCutaneous every 8 hours  multivitamin 1 Tablet(s) Oral daily  norepinephrine Infusion 0.5 MICROgram(s)/kG/Min (21.891 mL/Hr) IV Continuous <Continuous>  pantoprazole   Suspension 40 milliGRAM(s) Oral daily  propylthiouracil 50 milliGRAM(s) Oral daily    MEDICATIONS  (PRN):  acetaminophen    Suspension 650 milliGRAM(s) Oral every 6 hours PRN For Temp greater than 38 C (100.4 F)  HYDROmorphone  Injectable 2 milliGRAM(s) IV Push every 3 hours PRN Respiratory distress                RADIOLOGY & ADDITIONAL TESTS:      12/2/17 : Xray Chest 1 View AP -PORTABLE-Routine (17 @ 09:34) : No change in small bilateral pleural effusions and right perihilar airspace disease.        17 : Xray Chest 1 View AP- PORTABLE-Urgent (17 @ 11:51) : Enteric tube has been advanced, below the left hemidiaphragm, tip collimated off the field-of-view. ET tube and left IJ catheter remain in  place. No pneumothorax. Persistent right midlung opacity. Mild diffuse interstitial prominence.  Small bilateral pleural effusions.          17 : Xray Chest 1 View AP/PA (17 @ 05:48) : Increased lung volumes consistent with COPD. Bones are again quite  demineralized. Endotracheal tube is in good position. Nasogastric tube courses into the abdominal area off the lower edge of the film. Heart is grossly normal in size.  Significant infiltrates are seen again bilaterally but most pronounced in the right perihilar area.      17 : Xray Chest 1 View AP -PORTABLE-Routine (17 @ 16:28) : Patchy right perihilar opacities, which may be due to pneumonia. Mild  diffuse interstitial prominence. Small bilateral pleural effusions.            MICROBIOLOGY DATA:  Culture - Blood (17 @ 00:47)    -  Haemophilus influenzae: Detec    Gram Stain:   Growth in anaerobic bottle:  Gram Negative Coccobacilli    -  Ampicillin: S    -  Ceftriaxone: S    -  Chloramphenicol: S    -  Levofloxacin: S    -  Meropenem: S    Specimen Source: .Blood Blood-Venous    Organism: Blood Culture PCR    Organism: Haemophilus influenzae    Culture Results:   Growth in anaerobic bottle: Haemophilus influenzae  ***Blood Panel PCR results on this specimen are available  approximately 3 hours after the Gram stain result.***  Gram stain, PCR, and/or culture results may not always  correspond due to difference in methodologies.  ************************************************************  This PCR assay was performed using [employer].  The following targets are tested for: Enterococcus,  vancomycin resistant enterococci, Listeria monocytogenes,  coagulase negative staphylococci, S. aureus,  methicillin resistant S. aureus, Streptococcus agalactiae  (Group B), S. pneumoniae, S. pyogenes (Group A),  Acinetobacter baumannii, Enterobacter cloacae, E. coli,  Klebsiella oxytoca, K. pneumoniae, Proteus sp.,  Serratia marcescens, Haemophilus influenzae,  Neisseria meningitidis, Pseudomonas aeruginosa, Candida  albicans, C. glabrata, C krusei, C parapsilosis,  C. tropicalis and the KPC resistance gene.    Organism Identification: Blood Culture PCR  Haemophilus influenzae    Method Type: PCR    Method Type: KB          Culture - Sputum . (17 @ 11:55)    Gram Stain:   Moderate polymorphonuclear leukocytes per low power field  Rare Squamous epithelial cells per low power field  Rare Gram Positive Cocci per oil power field    Specimen Source: .Sputum Sputum - trap    Culture Results:   No growth      Culture - Blood (17 @ 10:13)    Specimen Source: .Blood Blood-Peripheral    Culture Results:   No growth to date.    Culture - Blood (17 @ 10:13)    Specimen Source: .Blood Blood-Peripheral    Culture Results:   No growth to date.        Legionella pneumophila Antigen, Urine (17 @ 17:17)    Legionella Antigen, Urine: Negative            Culture - Blood (17 @ 00:47)    Specimen Source: .Blood Blood-Peripheral    Culture Results:   No growth to date.      Culture - Urine (17 @ 00:19)    Specimen Source: .Urine Catheterized    Culture Results:   No growth          Rapid Respiratory Viral Panel (17 @ 19:15)    Rapid RVP Result: Detected: The [employer] RVP Rapid uses polymerase chain reaction (PCR) and melt  curve analysis to screen for adenovirus; coronavirus HKU1, NL63, 229E,  OC43; human metapneumovirus (hMPV); human enterovirus/rhinovirus  (Entero/RV); influenza A; influenza A/H1;influenza A/H3; influenza  A/H1-2009; influenza B; parainfluenza viruses 1, 2, 3, 4; respiratory  syncytial virus; Bordetella pertussis; Mycoplasma pneumoniae; and  Chlamydophila pneumoniae.

## 2017-12-06 NOTE — PROGRESS NOTE ADULT - SUBJECTIVE AND OBJECTIVE BOX
Time of Visit:  Patient seen and examined.     MEDICATIONS  (STANDING):  ascorbic acid 500 milliGRAM(s) Oral daily  aspirin  chewable 81 milliGRAM(s) Oral daily  atorvastatin 40 milliGRAM(s) Oral at bedtime  cefTRIAXone   IVPB 2 Gram(s) IV Intermittent every 24 hours  cefTRIAXone   IVPB      dexmedetomidine Infusion 0.2 MICROgram(s)/kG/Hr (3 mL/Hr) IV Continuous <Continuous>  ergocalciferol 91026 Unit(s) Oral <User Schedule>  ferrous    sulfate Liquid 300 milliGRAM(s) Enteral Tube daily  folic acid 1 milliGRAM(s) Oral daily  furosemide   Injectable 40 milliGRAM(s) IV Push daily  heparin  Injectable 5000 Unit(s) SubCutaneous every 8 hours  multivitamin 1 Tablet(s) Oral daily  norepinephrine Infusion 0.5 MICROgram(s)/kG/Min (21.891 mL/Hr) IV Continuous <Continuous>  pantoprazole   Suspension 40 milliGRAM(s) Oral daily  propylthiouracil 50 milliGRAM(s) Oral daily      MEDICATIONS  (PRN):  acetaminophen    Suspension 650 milliGRAM(s) Oral every 6 hours PRN For Temp greater than 38 C (100.4 F)  HYDROmorphone  Injectable 2 milliGRAM(s) IV Push every 3 hours PRN Respiratory distress       Medications up to date at time of exam.      PHYSICAL EXAMINATION:  Patient has no new complaints.  GENERAL: The patient is a well-developed, well-nourished, in no apparent distress.     Vital Signs Last 24 Hrs  T(C): 37.6 (06 Dec 2017 20:00), Max: 38.1 (06 Dec 2017 16:01)  T(F): 99.7 (06 Dec 2017 20:00), Max: 100.5 (06 Dec 2017 16:01)  HR: 95 (06 Dec 2017 20:30) (56 - 133)  BP: 144/64 (06 Dec 2017 20:30) (83/30 - 158/90)  BP(mean): 82 (06 Dec 2017 20:30) (42 - 112)  RR: 21 (06 Dec 2017 20:30) (15 - 25)  SpO2: 95% (06 Dec 2017 20:30) (82% - 100%)  Mode: AC/ CMV (Assist Control/ Continuous Mandatory Ventilation)  RR (machine): 16  TV (machine): 400  FiO2: 40  PEEP: 5  ITime: 1  MAP: 9  PIP: 26   (if applicable)    Chest Tube (if applicable)    HEENT: Head is normocephalic +ETT    NECK: Supple, no palpable adenopathy.    LUNGS: Clear to auscultation, no wheezing, rales, or rhonchi.    HEART: Regular rate and rhythm without murmur.    ABDOMEN: Soft, nontender, and nondistended.  No hepatosplenomegaly is noted.    EXTREMITIES: Without any cyanosis, clubbing, rash, lesions or edema.    NEUROLOGIC: Awake, but do not follow commads    SKIN: Warm, dry, good turgor.      LABS:                        9.6    25.6  )-----------( 222      ( 06 Dec 2017 05:45 )             30.9     12-06    140  |  104  |  9   ----------------------------<  98  3.8   |  34<H>  |  0.20<L>    Ca    8.0<L>      06 Dec 2017 05:45  Phos  2.0     12-06  Mg     2.0     12-06          ABG - ( 06 Dec 2017 04:51 )  pH: 7.43  /  pCO2: 53    /  pO2: 50    / HCO3: 34    / Base Excess: 9.1   /  SaO2: 83                              MICROBIOLOGY: (if applicable)    RADIOLOGY & ADDITIONAL STUDIES:  EKG:   CXR:  < from: Xray Chest 1 View AP -PORTABLE-Routine (12.06.17 @ 06:50) >  INTERPRETATION:  INDICATION: Respiratory failure    PRIORS: December 5, 2017 and December 4, 2017    VIEWS: Portable AP radiography of the chest performed. Evaluation limited   secondary to shallow inspiration, portal technique and patient rotation.    FINDINGS: Since prior evaluations there is no significant interval change   in position of the indwelling ETT or left IJ CVC. The distal tip of the   indwelling NG tube is not included on the radiographic evaluation. Heart   size appears within normal limits. There is continued opacity within the   right lower thorax, suggesting right-sided pleural effusion with   superimposed airspace opacities within the right upper and lower lung   fields. No definite left-sided infiltrate or consolidation is noted. A   small left pleural effusion is likely present. There is no evidence for   pneumothorax. No mediastinal shift is noted.    IMPRESSION: No significant interval change.      < end of copied text >  ECHO:    IMPRESSION: 89y Female PAST MEDICAL & SURGICAL HISTORY:  Psoriasis  Dementia  No significant past surgical history   p/w   IMP:    This is an 89 yrs old woman with prior medial condition admitted for acute hypoxic resp failure  and septic shock due to pna. She is intubated and now off pressors. Pna  and bacteremia due to H influenza. Pulmo hypetension. Failed daily SBT. copious ett secretions. WBC is tending up and with fevers    Sugg:  -continue vent support and daily SAT/SBR  -continue antibx  -recultures  -check ua  -consider adding iv vanco  -hemodynamic monitoring-diuresis  -dvt/gi prophylaxix  -trend wbc

## 2017-12-06 NOTE — PROGRESS NOTE ADULT - PROBLEM SELECTOR PLAN 5
Resolved EKG - LVH, LAD, incomplete LBBB, QRS /CE elevated to 1.5 then dropped to 1.0  - Acute onset of Afib; given one dose amiodarone and converted back to sinus rhythm  - C/w ASA and Atorvastatin  Cardiology Dr. Garrison

## 2017-12-06 NOTE — PROGRESS NOTE ADULT - ASSESSMENT
89 F from [facility] PM Right Femur Neck Fracture, DONELL, Hyperthyroidism (PTU), Constipation p/w difficulty breathing - admitted to ICU for hypoxic and hypercapnia respiratory failure and septic shock requiring pressors 2/2 PNA and bacteremic with H. influenza vs  Acute Systolic CHF EF 25%

## 2017-12-06 NOTE — PROGRESS NOTE ADULT - ASSESSMENT
A 89 yo Female with right femur neck fx, difficulty walking,  brought in to ER by EMS from [facility] for  evaluation of  difficulty of breathing. In the ER, she found to be tachycardic, tachypneic up to 50's, hypotensive to SBP of 50's and lethargic and required intubation. She also has leukocytosis and chest xray shows right sided pneumonia. She has started on Levaquin, Vancomycin and zosyn, cultures pending. The ID consult requested to assist with further evaluation and antibiotic  management.       # Septic shock- off pressor  # Right sided pneumonia  # RSV  # Bacteremia-  Haemophilus influenzae  # Repeat Blood culture- No growth to date 11/30/17      Would recommend:  1. CT scan of abdomen /pelvis including right HIp , still spiking fever and WBC count stay elevated on appropriate therapy for Haemophilus bacteremia  2. Monitor WBC count, stay elevated  3. Continue Ceftriaxone until 12/14/17  4.Weaning trial as per ICU protocol  5. Supportive care for RSV and Isolation as per Hospital policy      d/w ICU team    will follow the patient with you

## 2017-12-06 NOTE — PROGRESS NOTE ADULT - PROBLEM SELECTOR PLAN 1
2/2 RLL pneumonia and bacteremic 2/2 H. influenzae, +RVP  - No leukocytosis, afebrile overnight, and off pressors  - C/w Precedex and PRN Dilaudid for sedation  - BCx grew H. influenza sensitive to Rocephin  ***Repeat BCx/Sputum 11/30 NGTD  ***Concern for fluid overload; began IV Lasix; monitor UO and AM CXR 2/2 RLL pneumonia and bacteremic 2/2 H. influenzae, +RVP  - No leukocytosis, afebrile overnight, and off pressors  - C/w Precedex and PRN Dilaudid for sedation  - BCx grew H. influenza sensitive to Rocephin  ***Repeat BCx/Sputum 11/30 NGTD  - Concern for fluid overload; began IV Lasix; monitor UO and AM CXR  ***Restarted Levophed for hypotension

## 2017-12-07 NOTE — PROGRESS NOTE ADULT - SUBJECTIVE AND OBJECTIVE BOX
Patient is seen and examined at the bed side, still spiking fever but  currently is afebrile.   She remains intubated and on pressor. The Leukocytosis is worsening. She has loose BM more than 3 times. The CT abd/pelvis shows B/L Moderate pleural effusion with multifocal pneumonia, Biliary ductal dilatation.           REVIEW OF SYSTEMS: Unable to obtain since intubated/sedated          ICU Vital Signs Last 24 Hrs  T(C): 37.3 (07 Dec 2017 19:30), Max: 38.3 (06 Dec 2017 23:44)  T(F): 99.1 (07 Dec 2017 19:30), Max: 101 (06 Dec 2017 23:44)  HR: 88 (07 Dec 2017 19:30) (66 - 137)  BP: 135/48 (07 Dec 2017 19:30) (100/74 - 180/87)  BP(mean): 69 (07 Dec 2017 19:30) (49 - 104)  ABP: --  ABP(mean): --  RR: 18 (07 Dec 2017 19:30) (15 - 29)  SpO2: 100% (07 Dec 2017 19:30) (54% - 100%)            PHYSICAL EXAM:    GENERAL: Intubated/sedated  HEENT: ET tube in placed  CVS: s1 and s2 present  RESP: Air entry B/L   GI: abdomen distended  EXT: No pedal  edema, Right hip staples in placed, site looks clean, no  redness  CNS: intubated/sedated              ALLERGIES: NKDA                LABS:                          9.7    40.2  )-----------( 280      ( 07 Dec 2017 06:36 )             31.6                             9.6    25.6  )-----------( 222      ( 06 Dec 2017 05:45 )             30.9                             9.9    21.5  )-----------( 154      ( 05 Dec 2017 07:01 )             31.6               12-    142  |  103  |  9   ----------------------------<  139<H>  3.6   |  34<H>  |  0.30<L>    Ca    8.0<L>      07 Dec 2017 06:36  Phos  1.8     12  Mg     2.2     12                TPro  5.3<L>  /  Alb  2.6<L>  /  TBili  0.6  /  DBili  x   /  AST  24  /  ALT  24  /  AlkPhos  65  12-04            PT/INR - ( 2017 11:02 )   PT: 17.7 sec;   INR: 1.61 ratio         PTT - ( 2017 11:02 )  PTT:41.2 sec  Urinalysis Basic - ( 2017 16:48 )    Color: Yellow / Appearance: Slightly Turbid / S.020 / pH: x  Gluc: x / Ketone: Small  / Bili: Negative / Urobili: 4   Blood: x / Protein: 100 / Nitrite: Negative   Leuk Esterase: Negative / RBC: 5-10 /HPF / WBC 3-5 /HPF   Sq Epi: x / Non Sq Epi: Moderate /HPF / Bacteria: Many /HPF              ABG - ( 2017 05:30 )  pH: 7.38  /  pCO2: 43    /  pO2: 99    / HCO3: 24    / Base Excess: -0.3  /  SaO2: 98                MEDICATIONS  (STANDING):  cefTRIAXone   IVPB 2 Gram(s) IV Intermittent every 24 hours  cefTRIAXone   IVPB      dexmedetomidine Infusion 0.2 MICROgram(s)/kG/Hr (3 mL/Hr) IV Continuous <Continuous>  fentaNYL   Infusion 3 MICROgram(s)/kG/Hr (14.01 mL/Hr) IV Continuous <Continuous>  furosemide   Injectable 40 milliGRAM(s) IV Push every 12 hours  heparin  Injectable 5000 Unit(s) SubCutaneous every 8 hours  norepinephrine Infusion 0.5 MICROgram(s)/kG/Min (21.891 mL/Hr) IV Continuous <Continuous>  pantoprazole   Suspension 40 milliGRAM(s) Oral daily  propylthiouracil 50 milliGRAM(s) Oral daily  QUEtiapine 25 milliGRAM(s) Oral at bedtime    MEDICATIONS  (PRN):  acetaminophen    Suspension 650 milliGRAM(s) Oral every 6 hours PRN For Temp greater than 38 C (100.4 F)  fentaNYL    Injectable 100 MICROGram(s) IV Push every 2 hours PRN Severe Pain (7 - 10)                  RADIOLOGY & ADDITIONAL TESTS:    17: CT Abdomen and Pelvis w/ Oral Cont and w/ IV Cont (17 @ 18:36) : Nodular bilateral lung opacities and bilateral lower lobe consolidations,   suspicious for multifocal pneumonia.  Moderate bilateral pleural effusions.  BILE DUCTS: Biliary ductal dilatation.        17 : Xray Chest 1 View AP -PORTABLE-Routine (17 @ 09:34) : No change in small bilateral pleural effusions and right perihilar airspace disease.        17 : Xray Chest 1 View AP- PORTABLE-Urgent (17 @ 11:51) : Enteric tube has been advanced, below the left hemidiaphragm, tip collimated off the field-of-view. ET tube and left IJ catheter remain in  place. No pneumothorax. Persistent right midlung opacity. Mild diffuse interstitial prominence.  Small bilateral pleural effusions.          17 : Xray Chest 1 View AP/PA (17 @ 05:48) : Increased lung volumes consistent with COPD. Bones are again quite  demineralized. Endotracheal tube is in good position. Nasogastric tube courses into the abdominal area off the lower edge of the film. Heart is grossly normal in size.  Significant infiltrates are seen again bilaterally but most pronounced in the right perihilar area.      17 : Xray Chest 1 View AP -PORTABLE-Routine (17 @ 16:28) : Patchy right perihilar opacities, which may be due to pneumonia. Mild  diffuse interstitial prominence. Small bilateral pleural effusions.            MICROBIOLOGY DATA:    Culture - Blood (17 @ 00:47)    -  Haemophilus influenzae: Detec    Gram Stain:   Growth in anaerobic bottle:  Gram Negative Coccobacilli    -  Ampicillin: S    -  Ceftriaxone: S    -  Chloramphenicol: S    -  Levofloxacin: S    -  Meropenem: S    Specimen Source: .Blood Blood-Venous    Organism: Blood Culture PCR    Organism: Haemophilus influenzae    Culture Results:   Growth in anaerobic bottle: Haemophilus influenzae  ***Blood Panel PCR results on this specimen are available  approximately 3 hours after the Gram stain result.***  Gram stain, PCR, and/or culture results may not always  correspond due to difference in methodologies.  ************************************************************  This PCR assay was performed using [employer].  The following targets are tested for: Enterococcus,  vancomycin resistant enterococci, Listeria monocytogenes,  coagulase negative staphylococci, S. aureus,  methicillin resistant S. aureus, Streptococcus agalactiae  (Group B), S. pneumoniae, S. pyogenes (Group A),  Acinetobacter baumannii, Enterobacter cloacae, E. coli,  Klebsiella oxytoca, K. pneumoniae, Proteus sp.,  Serratia marcescens, Haemophilus influenzae,  Neisseria meningitidis, Pseudomonas aeruginosa, Candida  albicans, C. glabrata, C krusei, C parapsilosis,  C. tropicalis and the KPC resistance gene.    Organism Identification: Blood Culture PCR  Haemophilus influenzae    Method Type: PCR    Method Type: KB          Culture - Sputum . (17 @ 11:55)    Gram Stain:   Moderate polymorphonuclear leukocytes per low power field  Rare Squamous epithelial cells per low power field  Rare Gram Positive Cocci per oil power field    Specimen Source: .Sputum Sputum - trap    Culture Results:   No growth      Culture - Blood (17 @ 10:13)    Specimen Source: .Blood Blood-Peripheral    Culture Results:   No growth to date.    Culture - Blood (17 @ 10:13)    Specimen Source: .Blood Blood-Peripheral    Culture Results:   No growth to date.        Legionella pneumophila Antigen, Urine (17 @ 17:17)    Legionella Antigen, Urine: Negative            Culture - Blood (17 @ 00:47)    Specimen Source: .Blood Blood-Peripheral    Culture Results:   No growth to date.      Culture - Urine (17 @ 00:19)    Specimen Source: .Urine Catheterized    Culture Results:   No growth          Rapid Respiratory Viral Panel (17 @ 19:15)    Rapid RVP Result: Detected: The [employer] RVP Rapid uses polymerase chain reaction (PCR) and melt  curve analysis to screen for adenovirus; coronavirus HKU1, NL63, 229E,  OC43; human metapneumovirus (hMPV); human enterovirus/rhinovirus  (Entero/RV); influenza A; influenza A/H1;influenza A/H3; influenza  A/H1-2009; influenza B; parainfluenza viruses 1, 2, 3, 4; respiratory  syncytial virus; Bordetella pertussis; Mycoplasma pneumoniae; and  Chlamydophila pneumoniae.

## 2017-12-07 NOTE — PROGRESS NOTE ADULT - ASSESSMENT
A 89 yo Female with right femur neck fx, difficulty walking,  brought in to ER by EMS from [facility] for  evaluation of  difficulty of breathing. In the ER, she found to be tachycardic, tachypneic up to 50's, hypotensive to SBP of 50's and lethargic and required intubation. She also has leukocytosis and chest xray shows right sided pneumonia. She has started on Levaquin, Vancomycin and zosyn, cultures pending. The ID consult requested to assist with further evaluation and antibiotic  management.       # Septic shock- off pressor  # Multifocal pneumonia  # RSV  # Bacteremia-  Haemophilus influenzae  # Repeat Blood culture- No growth to date 11/30/17  # B/L Moderate pleural effusion      Would recommend:  1. Stool for C.difficile Toxin PCR and May benefit from Thoracentesis  2. Monitor WBC count, is worsening  3. Continue Ceftriaxone to Meropenem 1 g Q 8hours in the setting of biliary ductal dilatation  4. Weaning trial as per ICU protocol  5. Supportive care for RSV and Isolation as per Hospital policy  6. GI evaluation for further evaluation of biliary ductal dilatation      d/w ICU team    will follow the patient with you

## 2017-12-07 NOTE — PROGRESS NOTE ADULT - PROBLEM SELECTOR PLAN 2
Unknown cause, BCx NGTD and H. influenza sensitive to Rocephin  ID Garrison  - F/u CT abdomen and RUQ US; contact precautions 2/2 + RSV

## 2017-12-07 NOTE — PROGRESS NOTE ADULT - PROBLEM SELECTOR PLAN 3
Recent TTE indicates severe segmental systolic HF w/ EF 25. CXR indicated worsening congestion; IV lasix started. 2-PC DNR, no re-intubation on file.

## 2017-12-07 NOTE — PROGRESS NOTE ADULT - SUBJECTIVE AND OBJECTIVE BOX
· Subjective and Objective: 	  INTERVAL HPI/OVERNIGHT EVENTS: agitated, started seroquel overnight, possible delirium - WBC increased to 40K and hypoxic    PRESSORS: [x] YES Levophed    ANTIBIOTICS: Rocephin                  DATE STARTED: 12/2    Antimicrobial:  cefTRIAXone   IVPB 2 Gram(s) IV Intermittent every 24 hours  cefTRIAXone   IVPB        Cardiovascular:  furosemide   Injectable 40 milliGRAM(s) IV Push daily  norepinephrine Infusion 0.5 MICROgram(s)/kG/Min IV Continuous <Continuous>    Pulmonary:    Hematalogic:  aspirin  chewable 81 milliGRAM(s) Oral daily  heparin  Injectable 5000 Unit(s) SubCutaneous every 8 hours    Other:  acetaminophen    Suspension 650 milliGRAM(s) Oral every 6 hours PRN  ascorbic acid 500 milliGRAM(s) Oral daily  atorvastatin 40 milliGRAM(s) Oral at bedtime  dexmedetomidine Infusion 0.2 MICROgram(s)/kG/Hr IV Continuous <Continuous>  ergocalciferol 41564 Unit(s) Oral <User Schedule>  fentaNYL   Infusion 3 MICROgram(s)/kG/Hr IV Continuous <Continuous>  ferrous    sulfate Liquid 300 milliGRAM(s) Enteral Tube daily  folic acid 1 milliGRAM(s) Oral daily  multivitamin 1 Tablet(s) Oral daily  pantoprazole   Suspension 40 milliGRAM(s) Oral daily  propylthiouracil 50 milliGRAM(s) Oral daily  QUEtiapine 25 milliGRAM(s) Oral at bedtime    acetaminophen    Suspension 650 milliGRAM(s) Oral every 6 hours PRN  ascorbic acid 500 milliGRAM(s) Oral daily  aspirin  chewable 81 milliGRAM(s) Oral daily  atorvastatin 40 milliGRAM(s) Oral at bedtime  cefTRIAXone   IVPB 2 Gram(s) IV Intermittent every 24 hours  cefTRIAXone   IVPB      dexmedetomidine Infusion 0.2 MICROgram(s)/kG/Hr IV Continuous <Continuous>  ergocalciferol 28135 Unit(s) Oral <User Schedule>  fentaNYL   Infusion 3 MICROgram(s)/kG/Hr IV Continuous <Continuous>  ferrous    sulfate Liquid 300 milliGRAM(s) Enteral Tube daily  folic acid 1 milliGRAM(s) Oral daily  furosemide   Injectable 40 milliGRAM(s) IV Push daily  heparin  Injectable 5000 Unit(s) SubCutaneous every 8 hours  multivitamin 1 Tablet(s) Oral daily  norepinephrine Infusion 0.5 MICROgram(s)/kG/Min IV Continuous <Continuous>  pantoprazole   Suspension 40 milliGRAM(s) Oral daily  propylthiouracil 50 milliGRAM(s) Oral daily  QUEtiapine 25 milliGRAM(s) Oral at bedtime    Drug Dosing Weight  Height (cm): 144.78 (28 Nov 2017 18:44)  Weight (kg): 46.7 (28 Nov 2017 18:44)  BMI (kg/m2): 22.3 (28 Nov 2017 18:44)  BSA (m2): 1.36 (28 Nov 2017 18:44)    CENTRAL LINE: [x] YES [] NO  LOCATION: [facility]  DATE INSERTED: 11/29  REMOVE: [] YES [x] NO  EXPLAIN: IOs    VOSS: [] YES [x] NO    DATE INSERTED: 11/28  REMOVE:  [] YES [x] NO  EXPLAIN: IOs    A-LINE:  NO    PMH -reviewed admission note, no change since admission  PAST MEDICAL & SURGICAL HISTORY:  Psoriasis  Dementia  No significant past surgical history      ICU Vital Signs Last 24 Hrs  T(C): 37.3 (07 Dec 2017 04:30), Max: 38.3 (06 Dec 2017 23:44)  T(F): 99.2 (07 Dec 2017 04:30), Max: 101 (06 Dec 2017 23:44)  HR: 83 (07 Dec 2017 07:00) (56 - 137)  BP: 137/73 (07 Dec 2017 07:00) (83/30 - 180/87)  BP(mean): 84 (07 Dec 2017 07:00) (42 - 112)  ABP: --  ABP(mean): --  RR: 23 (07 Dec 2017 07:00) (15 - 29)  SpO2: 97% (07 Dec 2017 07:00) (54% - 100%)      ABG - ( 07 Dec 2017 05:06 )  pH: 7.45  /  pCO2: 50    /  pO2: 45    / HCO3: 34    / Base Excess: 9.6   /  SaO2: 80                    12-06 @ 07:01  -  12-07 @ 07:00  --------------------------------------------------------  IN: 2161.9 mL / OUT: 2390 mL / NET: -228.1 mL        Mode: AC/ CMV (Assist Control/ Continuous Mandatory Ventilation)  RR (machine): 16  TV (machine): 400  FiO2: 50  PEEP: 5  ITime: 1  MAP: 10  PIP: 31      PHYSICAL EXAM:    GENERAL: [x]Moderate Distress, []well-groomed, []well-developed  HEAD:  []Atraumatic, [x]Normocephalic  EYES: [x]EOMI, []PERRLA, []conjunctiva and sclera clear  ENMT: [x]No tonsillar erythema, exudates, or enlargement; []Moist mucous membranes, []Good dentition, []No lesions  NECK: [x]Supple, normal appearance, []No JVD; []Normal thyroid; []Trachea midline  NERVOUS SYSTEM:  [x]Sedated, []Good concentration; []Motor Strength 5/5 B/L upper and lower extremities; []DTRs 2+ intact and symmetric  CHEST/LUNG: [x]No chest deformity; []Normal percussion bilaterally; []No rales, rhonchi, wheezing   HEART: [x]Regular rate and rhythm; []No murmurs, rubs, or gallops  ABDOMEN: []Soft, Nontender, Nondistended; [x]Bowel sounds present  EXTREMITIES:  []2+ Peripheral Pulses, [x]No clubbing, cyanosis, or edema  LYMPH: [x]No lymphadenopathy noted  SKIN: [x]No rashes or lesions; []Good capillary refill      LABS:  CBC Full  -  ( 07 Dec 2017 06:36 )  WBC Count : 40.2 K/uL  Hemoglobin : 9.7 g/dL  Hematocrit : 31.6 %  Platelet Count - Automated : 280 K/uL  Mean Cell Volume : 99.5 fl  Mean Cell Hemoglobin : 30.5 pg  Mean Cell Hemoglobin Concentration : 30.7 gm/dL  Auto Neutrophil # : x  Auto Lymphocyte # : x  Auto Monocyte # : x  Auto Eosinophil # : x  Auto Basophil # : x  Auto Neutrophil % : x  Auto Lymphocyte % : x  Auto Monocyte % : x  Auto Eosinophil % : x  Auto Basophil % : x    12-07    142  |  103  |  9   ----------------------------<  139<H>  3.6   |  34<H>  |  0.30<L>    Ca    8.0<L>      07 Dec 2017 06:36  Phos  1.8     12-07  Mg     2.2     12-07              RADIOLOGY & ADDITIONAL STUDIES REVIEWED: YES    []GOALS OF CARE DISCUSSION WITH PATIENT/FAMILY/PROXY:    CRITICAL CARE TIME SPENT: 35 minutes    Assessment and Plan:   · Assessment		  89 F from [facility] Right Femur Neck Fracture, DONELL, Hyperthyroidism (PTU), Constipation p/w difficulty breathing - admitted to ICU for hypoxic and hypercapnia respiratory failure and septic shock requiring pressors 2/2 PNA and bacteremic with H. influenza vs  Acute Systolic CHF EF 25%     Problem/Plan - 1:  ·  Problem: Septic shock.  Plan: 2/2 RLL pneumonia and bacteremic 2/2 H. influenzae, +RVP  - No leukocytosis, afebrile overnight, and off pressors  - C/w Precedex and PRN Dilaudid for sedation  - BCx grew H. influenza sensitive to Rocephin  ***Repeat BCx/Sputum 11/30 NGTD  - Concern for fluid overload; began IV Lasix; monitor UO and AM CXR  ***Restarted Levophed for hypotension.      Problem/Plan - 2:  ·  Problem: Leukocytosis.  Plan: Unknown cause, BCx NGTD and H. influenza sensitive to Rocephin  ID Garrison  - F/u CT abdomen and RUQ US; contact precautions 2/2 + RSV.      Problem/Plan - 3:  ·  Problem: Respiratory failure.  Plan: Dependent on the ventilator  - Failed SBT p 5 minutes  - Adjusting sedation.      Problem/Plan - 4:  ·  Problem: Acute systolic congestive heart failure.  Plan: Prior TTE 11/8 moderate pulmonary HTN and normal EF  - Repeat TTE shows severe segmental systolic HF w/ EF 25  ***Began IV Lasix, AM CXR showed worsening congestion.      Problem/Plan - 5:  ·  Problem: EKG abnormalities.  Plan: EKG - LVH, LAD, incomplete LBBB, QRS /CE elevated to 1.5 then dropped to 1.0  - Acute onset of Afib; given one dose amiodarone and converted back to sinus rhythm  - C/w ASA and Atorvastatin  Cardiology Dr. Garrison.      Problem/Plan - 6:  Problem: Prophylactic measure. Plan: IMPROVE score 2 2/2 age and immobilization - DVT PPx w/ HSQ  - GI PPx w/ Protonix.

## 2017-12-07 NOTE — PROGRESS NOTE ADULT - PROBLEM SELECTOR PLAN 4
Patient remains sedated/intubated, with no identified surrogate/representative. 2-PC DNR, no re-intubation on file.

## 2017-12-07 NOTE — PROGRESS NOTE ADULT - PROBLEM SELECTOR PLAN 1
2/2 RLL pneumonia and bacteremic 2/2 H. influenzae, +RVP  - No leukocytosis, afebrile overnight, and off pressors  - C/w Precedex and PRN Dilaudid for sedation  - BCx grew H. influenza sensitive to Rocephin  ***Repeat BCx/Sputum 11/30 NGTD  - Concern for fluid overload; began IV Lasix; monitor UO and AM CXR  ***Restarted Levophed for hypotension

## 2017-12-07 NOTE — PROGRESS NOTE ADULT - ASSESSMENT
89 F from [facility] PM Right Femur Neck Fracture, DONELL, Hyperthyroidism (PTU), Constipation p/w difficulty breathing - admitted to ICU for hypoxic and hypercapnia respiratory failure and septic shock requiring pressors 2/2 PNA and bacteremic with H. influenza vs  Acute Systolic CHF EF 25%

## 2017-12-07 NOTE — PROGRESS NOTE ADULT - SUBJECTIVE AND OBJECTIVE BOX
OVERNIGHT EVENTS: agitated, started seroquel overnight, possible delirium - WBC increased to 40K and hypoxic    Present Symptoms:   Review of Systems: Pt sedated/intubated - unable to obtain     MEDICATIONS  (STANDING):  cefTRIAXone   IVPB 2 Gram(s) IV Intermittent every 24 hours  cefTRIAXone   IVPB      dexmedetomidine Infusion 0.2 MICROgram(s)/kG/Hr (3 mL/Hr) IV Continuous <Continuous>  fentaNYL   Infusion 3 MICROgram(s)/kG/Hr (14.01 mL/Hr) IV Continuous <Continuous>  furosemide   Injectable 40 milliGRAM(s) IV Push every 12 hours  heparin  Injectable 5000 Unit(s) SubCutaneous every 8 hours  norepinephrine Infusion 0.5 MICROgram(s)/kG/Min (21.891 mL/Hr) IV Continuous <Continuous>  pantoprazole   Suspension 40 milliGRAM(s) Oral daily  propylthiouracil 50 milliGRAM(s) Oral daily  QUEtiapine 25 milliGRAM(s) Oral at bedtime    MEDICATIONS  (PRN):  acetaminophen    Suspension 650 milliGRAM(s) Oral every 6 hours PRN For Temp greater than 38 C (100.4 F)  fentaNYL    Injectable 100 MICROGram(s) IV Push every 2 hours PRN Severe Pain (7 - 10)      PHYSICAL EXAM:  Vital Signs Last 24 Hrs  T(C): 37.2 (07 Dec 2017 12:00), Max: 38.3 (06 Dec 2017 23:44)  T(F): 98.9 (07 Dec 2017 12:00), Max: 101 (06 Dec 2017 23:44)  HR: 71 (07 Dec 2017 15:30) (66 - 137)  BP: 122/43 (07 Dec 2017 15:30) (90/38 - 180/87)  BP(mean): 61 (07 Dec 2017 15:30) (49 - 112)  RR: 18 (07 Dec 2017 15:30) (15 - 29)  SpO2: 97% (07 Dec 2017 15:30) (54% - 100%)  General: pt sedated/intubated, not alert, unable to follow command     Karnofsky Performance Score/Palliative Performance Status Version2:    20%    HEENT:  dry mouth  ET tube, NGT  Lungs:  intubated  CV: normal  GI:   incontinent, NGT  :   morales  Musculoskeletal: pt bedbound, unable to follow commands, dependent on all ADLs  Skin: DTI to the Sacrococcyx Area   Neuro: cognitive impairment, pt remains sedated/intubated - unable to follow commands   Oral intake ability: unable/only mouth care  Diet: NPO - NGT    LABS:                          9.7    40.2  )-----------( 280      ( 07 Dec 2017 06:36 )             31.6     12-07    142  |  103  |  9   ----------------------------<  139<H>  3.6   |  34<H>  |  0.30<L>    Ca    8.0<L>      07 Dec 2017 06:36  Phos  1.8     12-07  Mg     2.2     12-07          RADIOLOGY & ADDITIONAL STUDIES: reviewed    ADVANCE DIRECTIVES: 2 PC DNR, no re-intubation OVERNIGHT EVENTS: agitated, started seroquel overnight, possible delirium - WBC increased to 40K and hypoxic, started on vasopressors x1, still unable to wean today    Present Symptoms:   Review of Systems: Pt sedated/intubated - unable to obtain     MEDICATIONS  (STANDING):  cefTRIAXone   IVPB 2 Gram(s) IV Intermittent every 24 hours  cefTRIAXone   IVPB      dexmedetomidine Infusion 0.2 MICROgram(s)/kG/Hr (3 mL/Hr) IV Continuous <Continuous>  fentaNYL   Infusion 3 MICROgram(s)/kG/Hr (14.01 mL/Hr) IV Continuous <Continuous>  furosemide   Injectable 40 milliGRAM(s) IV Push every 12 hours  heparin  Injectable 5000 Unit(s) SubCutaneous every 8 hours  norepinephrine Infusion 0.5 MICROgram(s)/kG/Min (21.891 mL/Hr) IV Continuous <Continuous>  pantoprazole   Suspension 40 milliGRAM(s) Oral daily  propylthiouracil 50 milliGRAM(s) Oral daily  QUEtiapine 25 milliGRAM(s) Oral at bedtime    MEDICATIONS  (PRN):  acetaminophen    Suspension 650 milliGRAM(s) Oral every 6 hours PRN For Temp greater than 38 C (100.4 F)  fentaNYL    Injectable 100 MICROGram(s) IV Push every 2 hours PRN Severe Pain (7 - 10)      PHYSICAL EXAM:  Vital Signs Last 24 Hrs  T(C): 37.2 (07 Dec 2017 12:00), Max: 38.3 (06 Dec 2017 23:44)  T(F): 98.9 (07 Dec 2017 12:00), Max: 101 (06 Dec 2017 23:44)  HR: 71 (07 Dec 2017 15:30) (66 - 137)  BP: 122/43 (07 Dec 2017 15:30) (90/38 - 180/87)  BP(mean): 61 (07 Dec 2017 15:30) (49 - 112)  RR: 18 (07 Dec 2017 15:30) (15 - 29)  SpO2: 97% (07 Dec 2017 15:30) (54% - 100%)  General: pt sedated/intubated, not alert, unable to follow command     Karnofsky Performance Score/Palliative Performance Status Version2:    20%    HEENT:  dry mouth  ET tube, NGT  Lungs:  intubated  CV: normal  GI:   incontinent, NGT  :   morales  Musculoskeletal: pt bedbound, unable to follow commands, dependent on all ADLs  Skin: DTI to the Sacrococcyx Area   Neuro: cognitive impairment, pt remains sedated/intubated - unable to follow commands   Oral intake ability: unable/only mouth care  Diet: NPO - NGT    LABS:                          9.7    40.2  )-----------( 280      ( 07 Dec 2017 06:36 )             31.6     12-07    142  |  103  |  9   ----------------------------<  139<H>  3.6   |  34<H>  |  0.30<L>    Ca    8.0<L>      07 Dec 2017 06:36  Phos  1.8     12-07  Mg     2.2     12-07          RADIOLOGY & ADDITIONAL STUDIES: reviewed    ADVANCE DIRECTIVES: 2 PC DNR, no re-intubation

## 2017-12-07 NOTE — PROGRESS NOTE ADULT - PROBLEM SELECTOR PLAN 1
2/2 RLL pneumonia and bacteremic 2/2 H. influenzae, +RVP. Increasing WBC. Pt remains sedated and intubated; on pressor. Poor prognosis. 2-PC DNR, no re-intubation. 2/2 RLL pneumonia and bacteremic. Increasing WBC. Pt remains sedated and intubated; on pressor. Poor prognosis. 2-PC DNR, no re-intubation.

## 2017-12-07 NOTE — PROGRESS NOTE ADULT - PROBLEM SELECTOR PLAN 2
Secondary to septic shock. Pt remains sedated/intubated; failed SBT - unable to be weaned at this time. 2-PC DNR, no re-intubation on file.

## 2017-12-07 NOTE — PROGRESS NOTE ADULT - SUBJECTIVE AND OBJECTIVE BOX
Time of Visit:  Patient seen and examined.     MEDICATIONS  (STANDING):  cefTRIAXone   IVPB 2 Gram(s) IV Intermittent every 24 hours  cefTRIAXone   IVPB      dexmedetomidine Infusion 0.2 MICROgram(s)/kG/Hr (3 mL/Hr) IV Continuous <Continuous>  fentaNYL   Infusion 3 MICROgram(s)/kG/Hr (14.01 mL/Hr) IV Continuous <Continuous>  furosemide   Injectable 40 milliGRAM(s) IV Push every 12 hours  heparin  Injectable 5000 Unit(s) SubCutaneous every 8 hours  norepinephrine Infusion 0.5 MICROgram(s)/kG/Min (21.891 mL/Hr) IV Continuous <Continuous>  pantoprazole   Suspension 40 milliGRAM(s) Oral daily  propylthiouracil 50 milliGRAM(s) Oral daily  QUEtiapine 25 milliGRAM(s) Oral at bedtime      MEDICATIONS  (PRN):  acetaminophen    Suspension 650 milliGRAM(s) Oral every 6 hours PRN For Temp greater than 38 C (100.4 F)  fentaNYL    Injectable 100 MICROGram(s) IV Push every 2 hours PRN Severe Pain (7 - 10)       Medications up to date at time of exam.      PHYSICAL EXAMINATION:  Patient has no new complaints.  GENERAL: The patient is a well-developed, well-nourished, in no apparent distress.     Vital Signs Last 24 Hrs  T(C): 37.2 (07 Dec 2017 12:00), Max: 38.3 (06 Dec 2017 23:44)  T(F): 98.9 (07 Dec 2017 12:00), Max: 101 (06 Dec 2017 23:44)  HR: 76 (07 Dec 2017 17:00) (66 - 137)  BP: 134/40 (07 Dec 2017 17:00) (90/38 - 180/87)  BP(mean): 64 (07 Dec 2017 17:00) (49 - 112)  RR: 17 (07 Dec 2017 17:00) (15 - 29)  SpO2: 97% (07 Dec 2017 17:00) (54% - 100%)  Mode: AC/ CMV (Assist Control/ Continuous Mandatory Ventilation)  RR (machine): 16  TV (machine): 400  FiO2: 40  PEEP: 5  ITime: 1  MAP: 12  PIP: 33   (if applicable)    Chest Tube (if applicable)    HEENT: Head is normocephalic and atraumatic. Extraocular muscles are intact. Mucous membranes are moist.     NECK: Supple, no palpable adenopathy.    LUNGS: Clear to auscultation, no wheezing, rales, or rhonchi.    HEART: Regular rate and rhythm without murmur.    ABDOMEN: Soft, nontender, and nondistended.  No hepatosplenomegaly is noted.    EXTREMITIES: Without any cyanosis, clubbing, rash, lesions or edema.    NEUROLOGIC: Awake, alert, oriented.     SKIN: Warm, dry, good turgor.      LABS:                        9.7    40.2  )-----------( 280      ( 07 Dec 2017 06:36 )             31.6     12-07    142  |  103  |  9   ----------------------------<  139<H>  3.6   |  34<H>  |  0.30<L>    Ca    8.0<L>      07 Dec 2017 06:36  Phos  1.8     12-07  Mg     2.2     12-07          ABG - ( 07 Dec 2017 05:06 )  pH: 7.45  /  pCO2: 50    /  pO2: 45    / HCO3: 34    / Base Excess: 9.6   /  SaO2: 80                              MICROBIOLOGY: (if applicable)    RADIOLOGY & ADDITIONAL STUDIES:  EKG:   CXR:< from: Xray Chest 1 View AP -PORTABLE-Routine (12.07.17 @ 10:04) >  INTERPRETATION:  AP semierect chest on December 7 at 8:41 AM. Patient is   respirator dependent.    Increased lung volume suggests COPD. The heart does not measure enlarged.    Endotracheal tube, nasogastric tube, and left jugular line remain.    Moderate basilar effusions and diffuse congestive change in the lungs   again noted.    Chest is similar to December 6.    IMPRESSION: Unchanged chest as above.          < end of copied text >    ECHO:    IMPRESSION: 89y Female PAST MEDICAL & SURGICAL HISTORY:  Psoriasis  Dementia  No significant past surgical history   p/w       IMP:    This is an 89 yrs old woman with prior medial condition admitted for acute hypoxic resp failure  and septic shock due to pna. She is intubated and now off pressors. Pna  and bacteremia due to H influenza. Pulmo hypetension. Failed daily SBT. copious ett secretions. WBC is tending up and with fevers with watery diarrhea. Palliative eval noted.. 2 MD for DNR    Sugg:  -continue vent support and daily SAT/SBR  -continue antibx  -stool for c-idiff  -recultures  -check ua  -consider adding iv vanco  -hemodynamic monitoring-diuresis  -dvt/gi prophylaxix  -trend wbc

## 2017-12-07 NOTE — PROGRESS NOTE ADULT - SUBJECTIVE AND OBJECTIVE BOX
INTERVAL HPI/OVERNIGHT EVENTS: agitated, started seroquel overnight, possible delirium - WBC increased to 40K and hypoxic    PRESSORS: [x] YES Levophed    ANTIBIOTICS: Rocephin                  DATE STARTED: 12/2    Antimicrobial:  cefTRIAXone   IVPB 2 Gram(s) IV Intermittent every 24 hours  cefTRIAXone   IVPB        Cardiovascular:  furosemide   Injectable 40 milliGRAM(s) IV Push daily  norepinephrine Infusion 0.5 MICROgram(s)/kG/Min IV Continuous <Continuous>    Pulmonary:    Hematalogic:  aspirin  chewable 81 milliGRAM(s) Oral daily  heparin  Injectable 5000 Unit(s) SubCutaneous every 8 hours    Other:  acetaminophen    Suspension 650 milliGRAM(s) Oral every 6 hours PRN  ascorbic acid 500 milliGRAM(s) Oral daily  atorvastatin 40 milliGRAM(s) Oral at bedtime  dexmedetomidine Infusion 0.2 MICROgram(s)/kG/Hr IV Continuous <Continuous>  ergocalciferol 10999 Unit(s) Oral <User Schedule>  fentaNYL   Infusion 3 MICROgram(s)/kG/Hr IV Continuous <Continuous>  ferrous    sulfate Liquid 300 milliGRAM(s) Enteral Tube daily  folic acid 1 milliGRAM(s) Oral daily  multivitamin 1 Tablet(s) Oral daily  pantoprazole   Suspension 40 milliGRAM(s) Oral daily  propylthiouracil 50 milliGRAM(s) Oral daily  QUEtiapine 25 milliGRAM(s) Oral at bedtime    acetaminophen    Suspension 650 milliGRAM(s) Oral every 6 hours PRN  ascorbic acid 500 milliGRAM(s) Oral daily  aspirin  chewable 81 milliGRAM(s) Oral daily  atorvastatin 40 milliGRAM(s) Oral at bedtime  cefTRIAXone   IVPB 2 Gram(s) IV Intermittent every 24 hours  cefTRIAXone   IVPB      dexmedetomidine Infusion 0.2 MICROgram(s)/kG/Hr IV Continuous <Continuous>  ergocalciferol 87208 Unit(s) Oral <User Schedule>  fentaNYL   Infusion 3 MICROgram(s)/kG/Hr IV Continuous <Continuous>  ferrous    sulfate Liquid 300 milliGRAM(s) Enteral Tube daily  folic acid 1 milliGRAM(s) Oral daily  furosemide   Injectable 40 milliGRAM(s) IV Push daily  heparin  Injectable 5000 Unit(s) SubCutaneous every 8 hours  multivitamin 1 Tablet(s) Oral daily  norepinephrine Infusion 0.5 MICROgram(s)/kG/Min IV Continuous <Continuous>  pantoprazole   Suspension 40 milliGRAM(s) Oral daily  propylthiouracil 50 milliGRAM(s) Oral daily  QUEtiapine 25 milliGRAM(s) Oral at bedtime    Drug Dosing Weight  Height (cm): 144.78 (28 Nov 2017 18:44)  Weight (kg): 46.7 (28 Nov 2017 18:44)  BMI (kg/m2): 22.3 (28 Nov 2017 18:44)  BSA (m2): 1.36 (28 Nov 2017 18:44)    CENTRAL LINE: [x] YES [] NO  LOCATION: [facility]  DATE INSERTED: 11/29  REMOVE: [] YES [x] NO  EXPLAIN: IOs    VOSS: [] YES [x] NO    DATE INSERTED: 11/28  REMOVE:  [] YES [x] NO  EXPLAIN: IOs    A-LINE:  NO    PMH -reviewed admission note, no change since admission  PAST MEDICAL & SURGICAL HISTORY:  Psoriasis  Dementia  No significant past surgical history      ICU Vital Signs Last 24 Hrs  T(C): 37.3 (07 Dec 2017 04:30), Max: 38.3 (06 Dec 2017 23:44)  T(F): 99.2 (07 Dec 2017 04:30), Max: 101 (06 Dec 2017 23:44)  HR: 83 (07 Dec 2017 07:00) (56 - 137)  BP: 137/73 (07 Dec 2017 07:00) (83/30 - 180/87)  BP(mean): 84 (07 Dec 2017 07:00) (42 - 112)  ABP: --  ABP(mean): --  RR: 23 (07 Dec 2017 07:00) (15 - 29)  SpO2: 97% (07 Dec 2017 07:00) (54% - 100%)      ABG - ( 07 Dec 2017 05:06 )  pH: 7.45  /  pCO2: 50    /  pO2: 45    / HCO3: 34    / Base Excess: 9.6   /  SaO2: 80                    12-06 @ 07:01  -  12-07 @ 07:00  --------------------------------------------------------  IN: 2161.9 mL / OUT: 2390 mL / NET: -228.1 mL        Mode: AC/ CMV (Assist Control/ Continuous Mandatory Ventilation)  RR (machine): 16  TV (machine): 400  FiO2: 50  PEEP: 5  ITime: 1  MAP: 10  PIP: 31      PHYSICAL EXAM:    GENERAL: [x]Moderate Distress, []well-groomed, []well-developed  HEAD:  []Atraumatic, [x]Normocephalic  EYES: [x]EOMI, []PERRLA, []conjunctiva and sclera clear  ENMT: [x]No tonsillar erythema, exudates, or enlargement; []Moist mucous membranes, []Good dentition, []No lesions  NECK: [x]Supple, normal appearance, []No JVD; []Normal thyroid; []Trachea midline  NERVOUS SYSTEM:  [x]Sedated, []Good concentration; []Motor Strength 5/5 B/L upper and lower extremities; []DTRs 2+ intact and symmetric  CHEST/LUNG: [x]No chest deformity; []Normal percussion bilaterally; []No rales, rhonchi, wheezing   HEART: [x]Regular rate and rhythm; []No murmurs, rubs, or gallops  ABDOMEN: []Soft, Nontender, Nondistended; [x]Bowel sounds present  EXTREMITIES:  []2+ Peripheral Pulses, [x]No clubbing, cyanosis, or edema  LYMPH: [x]No lymphadenopathy noted  SKIN: [x]No rashes or lesions; []Good capillary refill      LABS:  CBC Full  -  ( 07 Dec 2017 06:36 )  WBC Count : 40.2 K/uL  Hemoglobin : 9.7 g/dL  Hematocrit : 31.6 %  Platelet Count - Automated : 280 K/uL  Mean Cell Volume : 99.5 fl  Mean Cell Hemoglobin : 30.5 pg  Mean Cell Hemoglobin Concentration : 30.7 gm/dL  Auto Neutrophil # : x  Auto Lymphocyte # : x  Auto Monocyte # : x  Auto Eosinophil # : x  Auto Basophil # : x  Auto Neutrophil % : x  Auto Lymphocyte % : x  Auto Monocyte % : x  Auto Eosinophil % : x  Auto Basophil % : x    12-07    142  |  103  |  9   ----------------------------<  139<H>  3.6   |  34<H>  |  0.30<L>    Ca    8.0<L>      07 Dec 2017 06:36  Phos  1.8     12-07  Mg     2.2     12-07              RADIOLOGY & ADDITIONAL STUDIES REVIEWED: YES    []GOALS OF CARE DISCUSSION WITH PATIENT/FAMILY/PROXY:    CRITICAL CARE TIME SPENT: 35 minutes

## 2017-12-08 NOTE — PROCEDURE NOTE - NSPOSTCAREGUIDE_GEN_A_CORE
Care for catheter as per unit/ICU protocols/Keep the cast/splint/dressing clean and dry
Keep the cast/splint/dressing clean and dry

## 2017-12-08 NOTE — PROGRESS NOTE ADULT - PROBLEM SELECTOR PLAN 2
Unknown cause, BCx NGTD and H. influenza sensitive to Rocephin  ID Garrison  - F/u CT abdomen and RUQ US; contact precautions 2/2 + RSV Unknown cause, BCx NGTD and H. influenza sensitive to Rocephin  - CT chest/abd/pelvis shows multifocal PNA and moderate bilateral infusions  ***Thoracentesis L - 850 bolld tinged/cloudy fluid drained, f/u results/Cx  - Meropenem Day 2  ID Garrison Unknown cause, BCx NGTD and H. influenza sensitive to Rocephin  - CT chest/abd/pelvis shows multifocal PNA and moderate bilateral infusions  ***Thoracentesis L - 850 blood tinged/cloudy fluid drained, f/u results/Cx  - Meropenem Day 2  ID Garrison

## 2017-12-08 NOTE — PROGRESS NOTE ADULT - PROBLEM SELECTOR PLAN 4
Prior TTE 11/8 moderate pulmonary HTN and normal EF  - Repeat TTE shows severe segmental systolic HF w/ EF 25  ***Began IV Lasix, AM CXR showed worsening congestion Likely 2/2 tube feeds  - Holding x 24 hours  ***If diarrhea improved, no need to test for C. diff; overall infection likely related to multifocal PNA

## 2017-12-08 NOTE — PROGRESS NOTE ADULT - PROBLEM SELECTOR PLAN 1
2/2 RLL pneumonia and bacteremic 2/2 H. influenzae, +RVP  - No leukocytosis, afebrile overnight, and off pressors  - C/w Precedex and PRN Dilaudid for sedation  - BCx grew H. influenza sensitive to Rocephin  ***Repeat BCx/Sputum 11/30 NGTD  - Concern for fluid overload; began IV Lasix; monitor UO and AM CXR  ***Restarted Levophed for hypotension 2/2 RLL pneumonia and bacteremic 2/2 H. influenzae, +RVP  - No leukocytosis, afebrile overnight, and off pressors  - C/w Precedex and PRN Fentanyl for sedation  - BCx grew H. influenza sensitive to Rocephin  - Repeat BCx/Sputum 11/30 NGTD, repeat Sputum GPR/GNR  - Concern for fluid overload; began IV Lasix; monitor UO and AM CXR  - C/w taper Levophed

## 2017-12-08 NOTE — PROGRESS NOTE ADULT - PROBLEM SELECTOR PLAN 6
IMPROVE score 2 2/2 age and immobilization - DVT PPx w/ HSQ  - GI PPx w/ Protonix EKG - LVH, LAD, incomplete LBBB, QRS /CE elevated to 1.5 then dropped to 1.0  - Acute onset of Afib; given one dose amiodarone and converted back to sinus rhythm  - C/w ASA and Atorvastatin  Cardiology Dr. Garrison

## 2017-12-08 NOTE — PROGRESS NOTE ADULT - SUBJECTIVE AND OBJECTIVE BOX
Patient is seen and examined at the bed side, currently is afebrile.   She remains intubated and on pressor. The Leukocytosis is trending down. She is s/p Right Thoracentesis with draining of about 800 ml fluid.           REVIEW OF SYSTEMS: Unable to obtain since intubated/sedated            ICU Vital Signs Last 24 Hrs  T(C): 36.6 (08 Dec 2017 16:00), Max: 38.8 (08 Dec 2017 12:30)  T(F): 97.8 (08 Dec 2017 16:00), Max: 101.9 (08 Dec 2017 12:30)  HR: 73 (08 Dec 2017 20:47) (62 - 196)  BP: 128/54 (08 Dec 2017 20:00) (60/30 - 151/52)  BP(mean): 72 (08 Dec 2017 20:00) (35 - 179)  ABP: --  ABP(mean): --  RR: 16 (08 Dec 2017 20:00) (14 - 28)  SpO2: 100% (08 Dec 2017 20:47) (93% - 100%)              PHYSICAL EXAM:    GENERAL: Intubated/sedated  HEENT: ET tube in placed  CVS: s1 and s2 present  RESP: Air entry B/L   GI: abdomen distended  EXT: No pedal  edema, Right hip staples in placed, site looks clean, no  redness  CNS: intubated/sedated              ALLERGIES: NKDA                LABS:                        10.1   32.6  )-----------( 346      ( 08 Dec 2017 06:10 )             32.5                             9.7    40.2  )-----------( 280      ( 07 Dec 2017 06:36 )             31.6                             9.6    25.6  )-----------( 222      ( 06 Dec 2017 05:45 )             30.9             12-08    143  |  104  |  7   ----------------------------<  88  3.4<L>   |  36<H>  |  <0.20<L>    Ca    7.9<L>      08 Dec 2017 06:10  Phos  1.8     12-08  Mg     2.2     12-08                  TPro  5.3<L>  /  Alb  2.6<L>  /  TBili  0.6  /  DBili  x   /  AST  24  /  ALT  24  /  AlkPhos  65  12-04            PT/INR - ( 2017 11:02 )   PT: 17.7 sec;   INR: 1.61 ratio         PTT - ( 2017 11:02 )  PTT:41.2 sec  Urinalysis Basic - ( 2017 16:48 )    Color: Yellow / Appearance: Slightly Turbid / S.020 / pH: x  Gluc: x / Ketone: Small  / Bili: Negative / Urobili: 4   Blood: x / Protein: 100 / Nitrite: Negative   Leuk Esterase: Negative / RBC: 5-10 /HPF / WBC 3-5 /HPF   Sq Epi: x / Non Sq Epi: Moderate /HPF / Bacteria: Many /HPF              ABG - ( 2017 05:30 )  pH: 7.38  /  pCO2: 43    /  pO2: 99    / HCO3: 24    / Base Excess: -0.3  /  SaO2: 98                  MEDICATIONS  (STANDING):  dexmedetomidine Infusion 0.2 MICROgram(s)/kG/Hr (3 mL/Hr) IV Continuous <Continuous>  dextrose 5% 250 milliLiter(s) (62.5 mL/Hr) IV Continuous <Continuous>  fentaNYL   Infusion 3 MICROgram(s)/kG/Hr (14.01 mL/Hr) IV Continuous <Continuous>  furosemide   Injectable 40 milliGRAM(s) IV Push every 12 hours  heparin  Injectable 5000 Unit(s) SubCutaneous every 8 hours  meropenem IVPB      meropenem IVPB 1000 milliGRAM(s) IV Intermittent every 8 hours  norepinephrine Infusion 0.5 MICROgram(s)/kG/Min (21.891 mL/Hr) IV Continuous <Continuous>  pantoprazole   Suspension 40 milliGRAM(s) Oral daily  propylthiouracil 50 milliGRAM(s) Oral daily  QUEtiapine 25 milliGRAM(s) Oral at bedtime    MEDICATIONS  (PRN):  acetaminophen    Suspension 650 milliGRAM(s) Oral every 6 hours PRN For Temp greater than 38 C (100.4 F)  fentaNYL    Injectable 100 MICROGram(s) IV Push every 2 hours PRN Severe Pain (7 - 10)                  RADIOLOGY & ADDITIONAL TESTS:    17 : Xray Chest 1 View AP- PORTABLE-Urgent (17 @ 17:04) : Persistent mild pulmonary vascular congestive changes. Persistent small pleural effusions.  No pneumothorax.    Life supporting devices in appropriate position.          17: CT Abdomen and Pelvis w/ Oral Cont and w/ IV Cont (17 @ 18:36) : Nodular bilateral lung opacities and bilateral lower lobe consolidations,   suspicious for multifocal pneumonia.  Moderate bilateral pleural effusions.  BILE DUCTS: Biliary ductal dilatation.        17 : Xray Chest 1 View AP -PORTABLE-Routine (17 @ 09:34) : No change in small bilateral pleural effusions and right perihilar airspace disease.        17 : Xray Chest 1 View AP- PORTABLE-Urgent (17 @ 11:51) : Enteric tube has been advanced, below the left hemidiaphragm, tip collimated off the field-of-view. ET tube and left IJ catheter remain in  place. No pneumothorax. Persistent right midlung opacity. Mild diffuse interstitial prominence.  Small bilateral pleural effusions.          17 : Xray Chest 1 View AP/PA (17 @ 05:48) : Increased lung volumes consistent with COPD. Bones are again quite  demineralized. Endotracheal tube is in good position. Nasogastric tube courses into the abdominal area off the lower edge of the film. Heart is grossly normal in size.  Significant infiltrates are seen again bilaterally but most pronounced in the right perihilar area.      17 : Xray Chest 1 View AP -PORTABLE-Routine (17 @ 16:28) : Patchy right perihilar opacities, which may be due to pneumonia. Mild  diffuse interstitial prominence. Small bilateral pleural effusions.            MICROBIOLOGY DATA:    Culture - Sputum . (17 @ 01:21)    Gram Stain:   Few polymorphonuclear leukocytes per low power field  Rare Squamous epithelial cells per low power field  Few Gram Positive Rods seen per oil power field    Specimen Source: .Sputum Sputum    Culture Results:   Normal Respiratory Jennifer present          Culture - Blood (17 @ 00:47)    -  Haemophilus influenzae: Detec    Gram Stain:   Growth in anaerobic bottle:  Gram Negative Coccobacilli    -  Ampicillin: S    -  Ceftriaxone: S    -  Chloramphenicol: S    -  Levofloxacin: S    -  Meropenem: S    Specimen Source: .Blood Blood-Venous    Organism: Blood Culture PCR    Organism: Haemophilus influenzae    Culture Results:   Growth in anaerobic bottle: Haemophilus influenzae  ***Blood Panel PCR results on this specimen are available  approximately 3 hours after the Gram stain result.***  Gram stain, PCR, and/or culture results may not always  correspond due to difference in methodologies.  ************************************************************  This PCR assay was performed using [employer].  The following targets are tested for: Enterococcus,  vancomycin resistant enterococci, Listeria monocytogenes,  coagulase negative staphylococci, S. aureus,  methicillin resistant S. aureus, Streptococcus agalactiae  (Group B), S. pneumoniae, S. pyogenes (Group A),  Acinetobacter baumannii, Enterobacter cloacae, E. coli,  Klebsiella oxytoca, K. pneumoniae, Proteus sp.,  Serratia marcescens, Haemophilus influenzae,  Neisseria meningitidis, Pseudomonas aeruginosa, Candida  albicans, C. glabrata, C krusei, C parapsilosis,  C. tropicalis and the KPC resistance gene.    Organism Identification: Blood Culture PCR  Haemophilus influenzae    Method Type: PCR    Method Type: KB          Culture - Sputum . (17 @ 11:55)    Gram Stain:   Moderate polymorphonuclear leukocytes per low power field  Rare Squamous epithelial cells per low power field  Rare Gram Positive Cocci per oil power field    Specimen Source: .Sputum Sputum - trap    Culture Results:   No growth      Culture - Blood (17 @ 10:13)    Specimen Source: .Blood Blood-Peripheral    Culture Results:   No growth to date.    Culture - Blood (17 @ 10:13)    Specimen Source: .Blood Blood-Peripheral    Culture Results:   No growth to date.        Legionella pneumophila Antigen, Urine (17 @ 17:17)    Legionella Antigen, Urine: Negative            Culture - Blood (17 @ 00:47)    Specimen Source: .Blood Blood-Peripheral    Culture Results:   No growth to date.      Culture - Urine (17 @ 00:19)    Specimen Source: .Urine Catheterized    Culture Results:   No growth          Rapid Respiratory Viral Panel (17 @ 19:15)    Rapid RVP Result: Detected: The [employer] RVP Rapid uses polymerase chain reaction (PCR) and melt  curve analysis to screen for adenovirus; coronavirus HKU1, NL63, 229E,  OC43; human metapneumovirus (hMPV); human enterovirus/rhinovirus  (Entero/RV); influenza A; influenza A/H1;influenza A/H3; influenza  A/H1-2009; influenza B; parainfluenza viruses 1, 2, 3, 4; respiratory  syncytial virus; Bordetella pertussis; Mycoplasma pneumoniae; and  Chlamydophila pneumoniae.

## 2017-12-08 NOTE — PROGRESS NOTE ADULT - SUBJECTIVE AND OBJECTIVE BOX
Time of Visit:  Patient seen and examined.  oral intubated     MEDICATIONS  (STANDING):  dexmedetomidine Infusion 0.2 MICROgram(s)/kG/Hr (3 mL/Hr) IV Continuous <Continuous>  fentaNYL   Infusion 3 MICROgram(s)/kG/Hr (14.01 mL/Hr) IV Continuous <Continuous>  furosemide   Injectable 40 milliGRAM(s) IV Push every 12 hours  heparin  Injectable 5000 Unit(s) SubCutaneous every 8 hours  meropenem IVPB      meropenem IVPB 1000 milliGRAM(s) IV Intermittent every 8 hours  norepinephrine Infusion 0.5 MICROgram(s)/kG/Min (21.891 mL/Hr) IV Continuous <Continuous>  pantoprazole   Suspension 40 milliGRAM(s) Oral daily  propylthiouracil 50 milliGRAM(s) Oral daily  QUEtiapine 25 milliGRAM(s) Oral at bedtime      MEDICATIONS  (PRN):  acetaminophen    Suspension 650 milliGRAM(s) Oral every 6 hours PRN For Temp greater than 38 C (100.4 F)  fentaNYL    Injectable 100 MICROGram(s) IV Push every 2 hours PRN Severe Pain (7 - 10)       Medications up to date at time of exam.      PHYSICAL EXAMINATION:  Patient has no new complaints.  GENERAL: The patient is a well-developed, well-nourished, in no apparent distress.     Vital Signs Last 24 Hrs  T(C): 36.6 (08 Dec 2017 16:00), Max: 38.8 (08 Dec 2017 12:30)  T(F): 97.8 (08 Dec 2017 16:00), Max: 101.9 (08 Dec 2017 12:30)  HR: 63 (08 Dec 2017 17:00) (63 - 196)  BP: 137/51 (08 Dec 2017 17:00) (60/30 - 151/52)  BP(mean): 73 (08 Dec 2017 17:00) (36 - 179)  RR: 14 (08 Dec 2017 17:00) (14 - 28)  SpO2: 100% (08 Dec 2017 17:00) (93% - 100%)  Mode: AC/ CMV (Assist Control/ Continuous Mandatory Ventilation)  RR (machine): 16  TV (machine): 400  FiO2: 40  PEEP: 5  ITime: 1  MAP: 9.3  PIP: 26   (if applicable)    Chest Tube (if applicable)    HEENT: Head is normocephalic and atraumatic. Extraocular muscles are intact. Mucous membranes are moist. + ETT    NECK: Supple, no palpable adenopathy.    LUNGS: Clear to auscultation, no wheezing, rales, or rhonchi.    HEART: Regular rate and rhythm without murmur.    ABDOMEN: Soft, nontender, and nondistended.  No hepatosplenomegaly is noted.    EXTREMITIES: Without any cyanosis, clubbing, rash, lesions or edema.    NEUROLOGIC: Awake, alert, oriented.     SKIN: Warm, dry, good turgor.      LABS:                        10.1   32.6  )-----------( 346      ( 08 Dec 2017 06:10 )             32.5     12-08    143  |  104  |  7   ----------------------------<  88  3.4<L>   |  36<H>  |  <0.20<L>    Ca    7.9<L>      08 Dec 2017 06:10  Phos  1.8     12-08  Mg     2.2     12-08          ABG - ( 08 Dec 2017 05:13 )  pH: 7.48  /  pCO2: 52    /  pO2: 99    / HCO3: 38    / Base Excess: 12.9  /  SaO2: 98                              MICROBIOLOGY: (if applicable)    RADIOLOGY & ADDITIONAL STUDIES:  EKG:   CXR:< from: Xray Chest 1 View AP- PORTABLE-Urgent (12.08.17 @ 09:51) >  FINDINGS/  IMPRESSION:  Patient is rotated.    ET and enteric tubes andleft IJ catheter remain in place. No   pneumothorax.    Mild diffuse interstitial prominence, patchy airspace opacities, worse in   the right lung, and small to moderate right and small left pleural   effusions, are similar to the prior study.    Thecardiac silhouette is within normal limits in size.         < end of copied text >    ECHO:    IMPRESSION: 89y Female PAST MEDICAL & SURGICAL HISTORY:  Psoriasis  Dementia  No significant past surgical history   p/w       IMP:    This is an 89 yrs old woman with prior medial condition admitted for acute hypoxic resp failure  and septic shock due to pna. She is intubated and now off pressors. Pna  and bacteremia due to H influenza. Pulmo hypetension. Failed daily SBT. copious ett secretions. WBC is tending up and with fevers with watery diarrhea. Palliative eval noted.. 2 MD for DNR . Pat spike fever today while on antibx CXR with b/l effusion    Sugg:  -continue vent support and daily SAT/SBR  -continue antibx  -stool for c-idiff  -check ua  -consider adding iv vanco  -hemodynamic monitoring-diuresis  -dvt/gi prophylaxix  -trend wbc

## 2017-12-08 NOTE — PROGRESS NOTE ADULT - SUBJECTIVE AND OBJECTIVE BOX
· Subjective and Objective: 	  INTERVAL HPI/OVERNIGHT EVENTS: CT chest/abd/pelvis shows B/L moderate pleural effusion with multifocal pneumonia and biliary ductal dilatation - C diff sent      PRESSORS: [] YES [] NO  WHICH:    ANTIBIOTICS:                  DATE STARTED:  ANTIBIOTICS:                  DATE STARTED:  ANTIBIOTICS:                  DATE STARTED:    Antimicrobial:  meropenem IVPB      meropenem IVPB 1000 milliGRAM(s) IV Intermittent every 8 hours    Cardiovascular:  furosemide   Injectable 40 milliGRAM(s) IV Push every 12 hours  norepinephrine Infusion 0.5 MICROgram(s)/kG/Min IV Continuous <Continuous>    Pulmonary:    Hematalogic:  heparin  Injectable 5000 Unit(s) SubCutaneous every 8 hours    Other:  acetaminophen    Suspension 650 milliGRAM(s) Oral every 6 hours PRN  dexmedetomidine Infusion 0.2 MICROgram(s)/kG/Hr IV Continuous <Continuous>  fentaNYL    Injectable 100 MICROGram(s) IV Push every 2 hours PRN  fentaNYL   Infusion 3 MICROgram(s)/kG/Hr IV Continuous <Continuous>  pantoprazole   Suspension 40 milliGRAM(s) Oral daily  propylthiouracil 50 milliGRAM(s) Oral daily  QUEtiapine 25 milliGRAM(s) Oral at bedtime    acetaminophen    Suspension 650 milliGRAM(s) Oral every 6 hours PRN  dexmedetomidine Infusion 0.2 MICROgram(s)/kG/Hr IV Continuous <Continuous>  fentaNYL    Injectable 100 MICROGram(s) IV Push every 2 hours PRN  fentaNYL   Infusion 3 MICROgram(s)/kG/Hr IV Continuous <Continuous>  furosemide   Injectable 40 milliGRAM(s) IV Push every 12 hours  heparin  Injectable 5000 Unit(s) SubCutaneous every 8 hours  meropenem IVPB      meropenem IVPB 1000 milliGRAM(s) IV Intermittent every 8 hours  norepinephrine Infusion 0.5 MICROgram(s)/kG/Min IV Continuous <Continuous>  pantoprazole   Suspension 40 milliGRAM(s) Oral daily  propylthiouracil 50 milliGRAM(s) Oral daily  QUEtiapine 25 milliGRAM(s) Oral at bedtime    Drug Dosing Weight  Height (cm): 144.78 (28 Nov 2017 18:44)  Weight (kg): 46.7 (28 Nov 2017 18:44)  BMI (kg/m2): 22.3 (28 Nov 2017 18:44)  BSA (m2): 1.36 (28 Nov 2017 18:44)    CENTRAL LINE: [] YES [] NO  LOCATION:   DATE INSERTED:  REMOVE: [] YES [] NO  EXPLAIN:    VOSS: [] YES [] NO    DATE INSERTED:  REMOVE:  [] YES [] NO  EXPLAIN:    A-LINE:  [] YES [] NO  LOCATION:   DATE INSERTED:  REMOVE:  [] YES [] NO  EXPLAIN:    PMH -reviewed admission note, no change since admission  PAST MEDICAL & SURGICAL HISTORY:  Psoriasis  Dementia  No significant past surgical history      ICU Vital Signs Last 24 Hrs  T(C): 36.6 (08 Dec 2017 07:00), Max: 37.3 (07 Dec 2017 19:30)  T(F): 97.9 (08 Dec 2017 07:00), Max: 99.1 (07 Dec 2017 19:30)  HR: 73 (08 Dec 2017 08:00) (68 - 160)  BP: 122/40 (08 Dec 2017 08:00) (94/53 - 159/53)  BP(mean): 59 (08 Dec 2017 08:00) (51 - 179)  ABP: --  ABP(mean): --  RR: 18 (08 Dec 2017 08:00) (15 - 23)  SpO2: 96% (08 Dec 2017 08:00) (91% - 100%)      ABG - ( 08 Dec 2017 05:13 )  pH: 7.48  /  pCO2: 52    /  pO2: 99    / HCO3: 38    / Base Excess: 12.9  /  SaO2: 98                    12-07 @ 07:01  -  12-08 @ 07:00  --------------------------------------------------------  IN: 1685.9 mL / OUT: 4160 mL / NET: -2474.1 mL        Mode: AC/ CMV (Assist Control/ Continuous Mandatory Ventilation)  RR (machine): 16  TV (machine): 400  FiO2: 4  PEEP: 5  ITime: 1  MAP: 11  PIP: 28      PHYSICAL EXAM:    GENERAL: []NAD, []well-groomed, []well-developed  HEAD:  []Atraumatic, []Normocephalic  EYES: []EOMI, []PERRLA, []conjunctiva and sclera clear  ENMT: []No tonsillar erythema, exudates, or enlargement; []Moist mucous membranes, []Good dentition, []No lesions  NECK: []Supple, normal appearance, []No JVD; []Normal thyroid; []Trachea midline  NERVOUS SYSTEM:  []Alert & Oriented X3, []Good concentration; []Motor Strength 5/5 B/L upper and lower extremities; []DTRs 2+ intact and symmetric  CHEST/LUNG: []No chest deformity; []Normal percussion bilaterally; []No rales, rhonchi, wheezing   HEART: []Regular rate and rhythm; []No murmurs, rubs, or gallops  ABDOMEN: []Soft, Nontender, Nondistended; []Bowel sounds present  EXTREMITIES:  []2+ Peripheral Pulses, []No clubbing, cyanosis, or edema  LYMPH: []No lymphadenopathy noted  SKIN: []No rashes or lesions; []Good capillary refill      LABS:  CBC Full  -  ( 08 Dec 2017 06:10 )  WBC Count : 32.6 K/uL  Hemoglobin : 10.1 g/dL  Hematocrit : 32.5 %  Platelet Count - Automated : 346 K/uL  Mean Cell Volume : 100.7 fl  Mean Cell Hemoglobin : 31.4 pg  Mean Cell Hemoglobin Concentration : 31.2 gm/dL  Auto Neutrophil # : x  Auto Lymphocyte # : x  Auto Monocyte # : x  Auto Eosinophil # : x  Auto Basophil # : x  Auto Neutrophil % : x  Auto Lymphocyte % : x  Auto Monocyte % : x  Auto Eosinophil % : x  Auto Basophil % : x    12-08    143  |  104  |  7   ----------------------------<  88  3.4<L>   |  36<H>  |  <0.20<L>    Ca    7.9<L>      08 Dec 2017 06:10  Phos  1.8     12-08  Mg     2.2     12-08              RADIOLOGY & ADDITIONAL STUDIES REVIEWED:  ***    []GOALS OF CARE DISCUSSION WITH PATIENT/FAMILY/PROXY:    CRITICAL CARE TIME SPENT: 35 minutes    Assessment and Plan:   · Assessment		  89 F from [facility] Right Femur Neck Fracture, DONELL, Hyperthyroidism (PTU), Constipation p/w difficulty breathing - admitted to ICU for hypoxic and hypercapnia respiratory failure and septic shock requiring pressors 2/2 PNA and bacteremic with H. influenza vs  Acute Systolic CHF EF 25%     Problem/Plan - 1:  ·  Problem: Septic shock.  Plan: 2/2 RLL pneumonia and bacteremic 2/2 H. influenzae, +RVP  - No leukocytosis, afebrile overnight, and off pressors  - C/w Precedex and PRN Dilaudid for sedation  - BCx grew H. influenza sensitive to Rocephin  ***Repeat BCx/Sputum 11/30 NGTD  - Concern for fluid overload; began IV Lasix; monitor UO and AM CXR  ***Restarted Levophed for hypotension.      Problem/Plan - 2:  ·  Problem: Leukocytosis.  Plan: Unknown cause, BCx NGTD and H. influenza sensitive to Rocephin  BERTHA Garrison  - F/u CT abdomen and RUQ US; contact precautions 2/2 + RSV.      Problem/Plan - 3:  ·  Problem: Respiratory failure.  Plan: Dependent on the ventilator  - Failed SBT p 5 minutes  - Adjusting sedation.      Problem/Plan - 4:  ·  Problem: Acute systolic congestive heart failure.  Plan: Prior TTE 11/8 moderate pulmonary HTN and normal EF  - Repeat TTE shows severe segmental systolic HF w/ EF 25  ***Began IV Lasix, AM CXR showed worsening congestion.      Problem/Plan - 5:  ·  Problem: EKG abnormalities.  Plan: EKG - LVH, LAD, incomplete LBBB, QRS /CE elevated to 1.5 then dropped to 1.0  - Acute onset of Afib; given one dose amiodarone and converted back to sinus rhythm  - C/w ASA and Atorvastatin  Cardiology Dr. Garrison.      Problem/Plan - 6:  Problem: Prophylactic measure. Plan: IMPROVE score 2 2/2 age and immobilization - DVT PPx w/ HSQ  - GI PPx w/ Protonix.

## 2017-12-08 NOTE — PROGRESS NOTE ADULT - PROBLEM SELECTOR PLAN 3
Dependent on the ventilator  - Failed SBT p 5 minutes  - Adjusting sedation Dependent on the ventilator  - Failed SBT   - Sedation w/ Precedex and Fentanyl PRN

## 2017-12-08 NOTE — PROCEDURE NOTE - NSSITEPREP_SKIN_A_CORE
chlorhexidine
povidone-iodine ( under 2 weeks of age or 1500 grams)/chlorhexidine/povidone iodine (if allergic to chlorhexidine)

## 2017-12-08 NOTE — PROGRESS NOTE ADULT - ASSESSMENT
89 F from [facility] PM Right Femur Neck Fracture, DONELL, Hyperthyroidism (PTU), Constipation p/w difficulty breathing - admitted to ICU for hypoxic and hypercapnia respiratory failure and septic shock requiring pressors 2/2 PNA and bacteremic with H. influenza vs  Acute Systolic CHF EF 25%

## 2017-12-08 NOTE — PROGRESS NOTE ADULT - SUBJECTIVE AND OBJECTIVE BOX
INTERVAL HPI/OVERNIGHT EVENTS: CT chest/abd/pelvis shows B/L moderate pleural effusion with multifocal pneumonia and biliary ductal dilatation - C diff sent      PRESSORS: [] YES [] NO  WHICH:    ANTIBIOTICS:                  DATE STARTED:  ANTIBIOTICS:                  DATE STARTED:  ANTIBIOTICS:                  DATE STARTED:    Antimicrobial:  meropenem IVPB      meropenem IVPB 1000 milliGRAM(s) IV Intermittent every 8 hours    Cardiovascular:  furosemide   Injectable 40 milliGRAM(s) IV Push every 12 hours  norepinephrine Infusion 0.5 MICROgram(s)/kG/Min IV Continuous <Continuous>    Pulmonary:    Hematalogic:  heparin  Injectable 5000 Unit(s) SubCutaneous every 8 hours    Other:  acetaminophen    Suspension 650 milliGRAM(s) Oral every 6 hours PRN  dexmedetomidine Infusion 0.2 MICROgram(s)/kG/Hr IV Continuous <Continuous>  fentaNYL    Injectable 100 MICROGram(s) IV Push every 2 hours PRN  fentaNYL   Infusion 3 MICROgram(s)/kG/Hr IV Continuous <Continuous>  pantoprazole   Suspension 40 milliGRAM(s) Oral daily  propylthiouracil 50 milliGRAM(s) Oral daily  QUEtiapine 25 milliGRAM(s) Oral at bedtime    acetaminophen    Suspension 650 milliGRAM(s) Oral every 6 hours PRN  dexmedetomidine Infusion 0.2 MICROgram(s)/kG/Hr IV Continuous <Continuous>  fentaNYL    Injectable 100 MICROGram(s) IV Push every 2 hours PRN  fentaNYL   Infusion 3 MICROgram(s)/kG/Hr IV Continuous <Continuous>  furosemide   Injectable 40 milliGRAM(s) IV Push every 12 hours  heparin  Injectable 5000 Unit(s) SubCutaneous every 8 hours  meropenem IVPB      meropenem IVPB 1000 milliGRAM(s) IV Intermittent every 8 hours  norepinephrine Infusion 0.5 MICROgram(s)/kG/Min IV Continuous <Continuous>  pantoprazole   Suspension 40 milliGRAM(s) Oral daily  propylthiouracil 50 milliGRAM(s) Oral daily  QUEtiapine 25 milliGRAM(s) Oral at bedtime    Drug Dosing Weight  Height (cm): 144.78 (28 Nov 2017 18:44)  Weight (kg): 46.7 (28 Nov 2017 18:44)  BMI (kg/m2): 22.3 (28 Nov 2017 18:44)  BSA (m2): 1.36 (28 Nov 2017 18:44)    CENTRAL LINE: [] YES [] NO  LOCATION:   DATE INSERTED:  REMOVE: [] YES [] NO  EXPLAIN:    VOSS: [] YES [] NO    DATE INSERTED:  REMOVE:  [] YES [] NO  EXPLAIN:    A-LINE:  [] YES [] NO  LOCATION:   DATE INSERTED:  REMOVE:  [] YES [] NO  EXPLAIN:    PMH -reviewed admission note, no change since admission  PAST MEDICAL & SURGICAL HISTORY:  Psoriasis  Dementia  No significant past surgical history      ICU Vital Signs Last 24 Hrs  T(C): 36.6 (08 Dec 2017 07:00), Max: 37.3 (07 Dec 2017 19:30)  T(F): 97.9 (08 Dec 2017 07:00), Max: 99.1 (07 Dec 2017 19:30)  HR: 73 (08 Dec 2017 08:00) (68 - 160)  BP: 122/40 (08 Dec 2017 08:00) (94/53 - 159/53)  BP(mean): 59 (08 Dec 2017 08:00) (51 - 179)  ABP: --  ABP(mean): --  RR: 18 (08 Dec 2017 08:00) (15 - 23)  SpO2: 96% (08 Dec 2017 08:00) (91% - 100%)      ABG - ( 08 Dec 2017 05:13 )  pH: 7.48  /  pCO2: 52    /  pO2: 99    / HCO3: 38    / Base Excess: 12.9  /  SaO2: 98                    12-07 @ 07:01  -  12-08 @ 07:00  --------------------------------------------------------  IN: 1685.9 mL / OUT: 4160 mL / NET: -2474.1 mL        Mode: AC/ CMV (Assist Control/ Continuous Mandatory Ventilation)  RR (machine): 16  TV (machine): 400  FiO2: 4  PEEP: 5  ITime: 1  MAP: 11  PIP: 28      PHYSICAL EXAM:    GENERAL: []NAD, []well-groomed, []well-developed  HEAD:  []Atraumatic, []Normocephalic  EYES: []EOMI, []PERRLA, []conjunctiva and sclera clear  ENMT: []No tonsillar erythema, exudates, or enlargement; []Moist mucous membranes, []Good dentition, []No lesions  NECK: []Supple, normal appearance, []No JVD; []Normal thyroid; []Trachea midline  NERVOUS SYSTEM:  []Alert & Oriented X3, []Good concentration; []Motor Strength 5/5 B/L upper and lower extremities; []DTRs 2+ intact and symmetric  CHEST/LUNG: []No chest deformity; []Normal percussion bilaterally; []No rales, rhonchi, wheezing   HEART: []Regular rate and rhythm; []No murmurs, rubs, or gallops  ABDOMEN: []Soft, Nontender, Nondistended; []Bowel sounds present  EXTREMITIES:  []2+ Peripheral Pulses, []No clubbing, cyanosis, or edema  LYMPH: []No lymphadenopathy noted  SKIN: []No rashes or lesions; []Good capillary refill      LABS:  CBC Full  -  ( 08 Dec 2017 06:10 )  WBC Count : 32.6 K/uL  Hemoglobin : 10.1 g/dL  Hematocrit : 32.5 %  Platelet Count - Automated : 346 K/uL  Mean Cell Volume : 100.7 fl  Mean Cell Hemoglobin : 31.4 pg  Mean Cell Hemoglobin Concentration : 31.2 gm/dL  Auto Neutrophil # : x  Auto Lymphocyte # : x  Auto Monocyte # : x  Auto Eosinophil # : x  Auto Basophil # : x  Auto Neutrophil % : x  Auto Lymphocyte % : x  Auto Monocyte % : x  Auto Eosinophil % : x  Auto Basophil % : x    12-08    143  |  104  |  7   ----------------------------<  88  3.4<L>   |  36<H>  |  <0.20<L>    Ca    7.9<L>      08 Dec 2017 06:10  Phos  1.8     12-08  Mg     2.2     12-08              RADIOLOGY & ADDITIONAL STUDIES REVIEWED:  ***    []GOALS OF CARE DISCUSSION WITH PATIENT/FAMILY/PROXY:    CRITICAL CARE TIME SPENT: 35 minutes INTERVAL HPI/OVERNIGHT EVENTS: CT chest/abd/pelvis shows B/L moderate pleural effusion with multifocal pneumonia and biliary ductal dilatation       PRESSORS: NE    ANTIBIOTICS:  JOLENE                DATE STARTED: 12/7    Antimicrobial:  meropenem IVPB      meropenem IVPB 1000 milliGRAM(s) IV Intermittent every 8 hours    Cardiovascular:  furosemide   Injectable 40 milliGRAM(s) IV Push every 12 hours  norepinephrine Infusion 0.5 MICROgram(s)/kG/Min IV Continuous <Continuous>    Pulmonary:    Hematalogic:  heparin  Injectable 5000 Unit(s) SubCutaneous every 8 hours    Other:  acetaminophen    Suspension 650 milliGRAM(s) Oral every 6 hours PRN  dexmedetomidine Infusion 0.2 MICROgram(s)/kG/Hr IV Continuous <Continuous>  fentaNYL    Injectable 100 MICROGram(s) IV Push every 2 hours PRN  fentaNYL   Infusion 3 MICROgram(s)/kG/Hr IV Continuous <Continuous>  pantoprazole   Suspension 40 milliGRAM(s) Oral daily  propylthiouracil 50 milliGRAM(s) Oral daily  QUEtiapine 25 milliGRAM(s) Oral at bedtime    acetaminophen    Suspension 650 milliGRAM(s) Oral every 6 hours PRN  dexmedetomidine Infusion 0.2 MICROgram(s)/kG/Hr IV Continuous <Continuous>  fentaNYL    Injectable 100 MICROGram(s) IV Push every 2 hours PRN  fentaNYL   Infusion 3 MICROgram(s)/kG/Hr IV Continuous <Continuous>  furosemide   Injectable 40 milliGRAM(s) IV Push every 12 hours  heparin  Injectable 5000 Unit(s) SubCutaneous every 8 hours  meropenem IVPB      meropenem IVPB 1000 milliGRAM(s) IV Intermittent every 8 hours  norepinephrine Infusion 0.5 MICROgram(s)/kG/Min IV Continuous <Continuous>  pantoprazole   Suspension 40 milliGRAM(s) Oral daily  propylthiouracil 50 milliGRAM(s) Oral daily  QUEtiapine 25 milliGRAM(s) Oral at bedtime    Drug Dosing Weight  Height (cm): 144.78 (28 Nov 2017 18:44)  Weight (kg): 46.7 (28 Nov 2017 18:44)  BMI (kg/m2): 22.3 (28 Nov 2017 18:44)  BSA (m2): 1.36 (28 Nov 2017 18:44)    CENTRAL LINE: [] YES [] NO  LOCATION:   DATE INSERTED:  REMOVE: [] YES [] NO  EXPLAIN:    VOSS: [] YES [] NO    DATE INSERTED:  REMOVE:  [] YES [] NO  EXPLAIN:    A-LINE:  [] YES [] NO  LOCATION:   DATE INSERTED:  REMOVE:  [] YES [] NO  EXPLAIN:    PMH -reviewed admission note, no change since admission  PAST MEDICAL & SURGICAL HISTORY:  Psoriasis  Dementia  No significant past surgical history      ICU Vital Signs Last 24 Hrs  T(C): 36.6 (08 Dec 2017 07:00), Max: 37.3 (07 Dec 2017 19:30)  T(F): 97.9 (08 Dec 2017 07:00), Max: 99.1 (07 Dec 2017 19:30)  HR: 73 (08 Dec 2017 08:00) (68 - 160)  BP: 122/40 (08 Dec 2017 08:00) (94/53 - 159/53)  BP(mean): 59 (08 Dec 2017 08:00) (51 - 179)  ABP: --  ABP(mean): --  RR: 18 (08 Dec 2017 08:00) (15 - 23)  SpO2: 96% (08 Dec 2017 08:00) (91% - 100%)      ABG - ( 08 Dec 2017 05:13 )  pH: 7.48  /  pCO2: 52    /  pO2: 99    / HCO3: 38    / Base Excess: 12.9  /  SaO2: 98                    12-07 @ 07:01  -  12-08 @ 07:00  --------------------------------------------------------  IN: 1685.9 mL / OUT: 4160 mL / NET: -2474.1 mL        Mode: AC/ CMV (Assist Control/ Continuous Mandatory Ventilation)  RR (machine): 16  TV (machine): 400  FiO2: 4  PEEP: 5  ITime: 1  MAP: 11  PIP: 28      PHYSICAL EXAM:    GENERAL: []NAD, []well-groomed, []well-developed  HEAD:  []Atraumatic, []Normocephalic  EYES: []EOMI, []PERRLA, []conjunctiva and sclera clear  ENMT: []No tonsillar erythema, exudates, or enlargement; []Moist mucous membranes, []Good dentition, []No lesions  NECK: []Supple, normal appearance, []No JVD; []Normal thyroid; []Trachea midline  NERVOUS SYSTEM:  []Alert & Oriented X3, []Good concentration; []Motor Strength 5/5 B/L upper and lower extremities; []DTRs 2+ intact and symmetric  CHEST/LUNG: []No chest deformity; []Normal percussion bilaterally; []No rales, rhonchi, wheezing   HEART: []Regular rate and rhythm; []No murmurs, rubs, or gallops  ABDOMEN: []Soft, Nontender, Nondistended; []Bowel sounds present  EXTREMITIES:  []2+ Peripheral Pulses, []No clubbing, cyanosis, or edema  LYMPH: []No lymphadenopathy noted  SKIN: []No rashes or lesions; []Good capillary refill      LABS:  CBC Full  -  ( 08 Dec 2017 06:10 )  WBC Count : 32.6 K/uL  Hemoglobin : 10.1 g/dL  Hematocrit : 32.5 %  Platelet Count - Automated : 346 K/uL  Mean Cell Volume : 100.7 fl  Mean Cell Hemoglobin : 31.4 pg  Mean Cell Hemoglobin Concentration : 31.2 gm/dL  Auto Neutrophil # : x  Auto Lymphocyte # : x  Auto Monocyte # : x  Auto Eosinophil # : x  Auto Basophil # : x  Auto Neutrophil % : x  Auto Lymphocyte % : x  Auto Monocyte % : x  Auto Eosinophil % : x  Auto Basophil % : x    12-08    143  |  104  |  7   ----------------------------<  88  3.4<L>   |  36<H>  |  <0.20<L>    Ca    7.9<L>      08 Dec 2017 06:10  Phos  1.8     12-08  Mg     2.2     12-08              RADIOLOGY & ADDITIONAL STUDIES REVIEWED:  ***    []GOALS OF CARE DISCUSSION WITH PATIENT/FAMILY/PROXY:    CRITICAL CARE TIME SPENT: 35 minutes INTERVAL HPI/OVERNIGHT EVENTS: CT chest/abd/pelvis shows B/L moderate pleural effusion with multifocal pneumonia and biliary ductal dilatation       PRESSORS: NE    ANTIBIOTICS:  JOLENE                DATE STARTED: 12/7    Antimicrobial:  meropenem IVPB      meropenem IVPB 1000 milliGRAM(s) IV Intermittent every 8 hours    Cardiovascular:  furosemide   Injectable 40 milliGRAM(s) IV Push every 12 hours  norepinephrine Infusion 0.5 MICROgram(s)/kG/Min IV Continuous <Continuous>    Pulmonary:    Hematalogic:  heparin  Injectable 5000 Unit(s) SubCutaneous every 8 hours    Other:  acetaminophen    Suspension 650 milliGRAM(s) Oral every 6 hours PRN  dexmedetomidine Infusion 0.2 MICROgram(s)/kG/Hr IV Continuous <Continuous>  fentaNYL    Injectable 100 MICROGram(s) IV Push every 2 hours PRN  fentaNYL   Infusion 3 MICROgram(s)/kG/Hr IV Continuous <Continuous>  pantoprazole   Suspension 40 milliGRAM(s) Oral daily  propylthiouracil 50 milliGRAM(s) Oral daily  QUEtiapine 25 milliGRAM(s) Oral at bedtime    acetaminophen    Suspension 650 milliGRAM(s) Oral every 6 hours PRN  dexmedetomidine Infusion 0.2 MICROgram(s)/kG/Hr IV Continuous <Continuous>  fentaNYL    Injectable 100 MICROGram(s) IV Push every 2 hours PRN  fentaNYL   Infusion 3 MICROgram(s)/kG/Hr IV Continuous <Continuous>  furosemide   Injectable 40 milliGRAM(s) IV Push every 12 hours  heparin  Injectable 5000 Unit(s) SubCutaneous every 8 hours  meropenem IVPB      meropenem IVPB 1000 milliGRAM(s) IV Intermittent every 8 hours  norepinephrine Infusion 0.5 MICROgram(s)/kG/Min IV Continuous <Continuous>  pantoprazole   Suspension 40 milliGRAM(s) Oral daily  propylthiouracil 50 milliGRAM(s) Oral daily  QUEtiapine 25 milliGRAM(s) Oral at bedtime    Drug Dosing Weight  Height (cm): 144.78 (28 Nov 2017 18:44)  Weight (kg): 46.7 (28 Nov 2017 18:44)  BMI (kg/m2): 22.3 (28 Nov 2017 18:44)  BSA (m2): 1.36 (28 Nov 2017 18:44)    CENTRAL LINE: [x] YES [] NO  LOCATION: [facility]  DATE INSERTED: 11/29  REMOVE: [] YES [x] NO  EXPLAIN: Aishwarya VOSS: [] YES [x] NO    DATE INSERTED: 11/28  REMOVE:  [] YES [x] NO  EXPLAIN: IOs    A-LINE:  NO    PMH -reviewed admission note, no change since admission  PAST MEDICAL & SURGICAL HISTORY:  Psoriasis  Dementia  No significant past surgical history      ICU Vital Signs Last 24 Hrs  T(C): 36.6 (08 Dec 2017 07:00), Max: 37.3 (07 Dec 2017 19:30)  T(F): 97.9 (08 Dec 2017 07:00), Max: 99.1 (07 Dec 2017 19:30)  HR: 73 (08 Dec 2017 08:00) (68 - 160)  BP: 122/40 (08 Dec 2017 08:00) (94/53 - 159/53)  BP(mean): 59 (08 Dec 2017 08:00) (51 - 179)  ABP: --  ABP(mean): --  RR: 18 (08 Dec 2017 08:00) (15 - 23)  SpO2: 96% (08 Dec 2017 08:00) (91% - 100%)      ABG - ( 08 Dec 2017 05:13 )  pH: 7.48  /  pCO2: 52    /  pO2: 99    / HCO3: 38    / Base Excess: 12.9  /  SaO2: 98                    12-07 @ 07:01  -  12-08 @ 07:00  --------------------------------------------------------  IN: 1685.9 mL / OUT: 4160 mL / NET: -2474.1 mL        Mode: AC/ CMV (Assist Control/ Continuous Mandatory Ventilation)  RR (machine): 16  TV (machine): 400  FiO2: 4  PEEP: 5  ITime: 1  MAP: 11  PIP: 28      PHYSICAL EXAM:    GENERAL: [x]Moderate Distress, []well-groomed, []well-developed  HEAD:  []Atraumatic, [x]Normocephalic  EYES: [x]EOMI, []PERRLA, []conjunctiva and sclera clear  ENMT: [x]No tonsillar erythema, exudates, or enlargement; []Moist mucous membranes, []Good dentition, []No lesions  NECK: [x]Supple, normal appearance, []No JVD; []Normal thyroid; []Trachea midline  NERVOUS SYSTEM:  [x]Sedated, []Good concentration; []Motor Strength 5/5 B/L upper and lower extremities; []DTRs 2+ intact and symmetric  CHEST/LUNG: [x]No chest deformity; []Normal percussion bilaterally; []No rales, rhonchi, wheezing   HEART: [x]Regular rate and rhythm; []No murmurs, rubs, or gallops  ABDOMEN: []Soft, Nontender, Nondistended; [x]Bowel sounds present  EXTREMITIES:  []2+ Peripheral Pulses, [x]No clubbing, cyanosis, or edema  LYMPH: [x]No lymphadenopathy noted  SKIN: [x]No rashes or lesions; []Good capillary refill      LABS:  CBC Full  -  ( 08 Dec 2017 06:10 )  WBC Count : 32.6 K/uL  Hemoglobin : 10.1 g/dL  Hematocrit : 32.5 %  Platelet Count - Automated : 346 K/uL  Mean Cell Volume : 100.7 fl  Mean Cell Hemoglobin : 31.4 pg  Mean Cell Hemoglobin Concentration : 31.2 gm/dL  Auto Neutrophil # : x  Auto Lymphocyte # : x  Auto Monocyte # : x  Auto Eosinophil # : x  Auto Basophil # : x  Auto Neutrophil % : x  Auto Lymphocyte % : x  Auto Monocyte % : x  Auto Eosinophil % : x  Auto Basophil % : x    12-08    143  |  104  |  7   ----------------------------<  88  3.4<L>   |  36<H>  |  <0.20<L>    Ca    7.9<L>      08 Dec 2017 06:10  Phos  1.8     12-08  Mg     2.2     12-08    RADIOLOGY & ADDITIONAL STUDIES REVIEWED:  YES    []GOALS OF CARE DISCUSSION WITH PATIENT/FAMILY/PROXY:    CRITICAL CARE TIME SPENT: 35 minutes

## 2017-12-08 NOTE — PROCEDURE NOTE - NSPROCDETAILS_GEN_ALL_CORE
guidewire recovered/lumen(s) aspirated and flushed/sterile dressing applied/ultrasound guidance/sterile technique, catheter placed
catheter inserted over needle/location identified, draped/prepped, sterile technique used, needle inserted/introduced/Seldinger technique

## 2017-12-08 NOTE — PROGRESS NOTE ADULT - ASSESSMENT
A 89 yo Female with right femur neck fx, difficulty walking,  brought in to ER by EMS from [facility] for  evaluation of  difficulty of breathing. In the ER, she found to be tachycardic, tachypneic up to 50's, hypotensive to SBP of 50's and lethargic and required intubation. She also has leukocytosis and chest xray shows right sided pneumonia. She has started on Levaquin, Vancomycin and zosyn, cultures pending. The ID consult requested to assist with further evaluation and antibiotic  management.       # Septic shock- off pressor  # Multifocal pneumonia  # RSV  # Bacteremia-  Haemophilus influenzae  # Repeat Blood culture- No growth to date 11/30/17  # Biliary ductal dilatation   # B/L Moderate pleural effusion- s/p Right thoracentesis 12/8/17      Would recommend:  1. Follow up Pleural fluid culture  2. Monitor WBC count, is trending  back down  3. Continue  Meropenem 1 g Q 8hours in the setting of biliary ductal dilatation  4. Weaning trial as per ICU protocol  5. Supportive care for RSV and Isolation as per Hospital policy  6. Consider GI evaluation for further evaluation of biliary ductal dilatation      d/w ICU team    will follow the patient with you

## 2017-12-09 NOTE — PROGRESS NOTE ADULT - PROBLEM SELECTOR PLAN 4
Likely 2/2 tube feeds  - Holding x 24 hours  ***If diarrhea improved, no need to test for C. diff; overall infection likely related to multifocal PNA

## 2017-12-09 NOTE — PROGRESS NOTE ADULT - PROBLEM SELECTOR PLAN 1
2/2 RLL pneumonia and bacteremic 2/2 H. influenzae, +RVP  - No leukocytosis, afebrile overnight, and off pressors  - C/w Precedex and PRN Fentanyl for sedation  - BCx grew H. influenza sensitive to Rocephin  - Repeat BCx/Sputum 11/30 NGTD, repeat Sputum GPR/GNR  - Concern for fluid overload; began IV Lasix; monitor UO and AM CXR  - C/w taper Levophed

## 2017-12-09 NOTE — PROGRESS NOTE ADULT - PROBLEM SELECTOR PLAN 2
Unknown cause, BCx NGTD and H. influenza sensitive to Rocephin  - CT chest/abd/pelvis shows multifocal PNA and moderate bilateral infusions  ***Thoracentesis L - 850 blood tinged/cloudy fluid drained, f/u results/Cx  - Meropenem Day 2  ID [place]

## 2017-12-09 NOTE — PROGRESS NOTE ADULT - SUBJECTIVE AND OBJECTIVE BOX
Patient is seen and examined at the bed side, currently is afebrile.   She is intubated and on pressor. The Leukocytosis is trending down. The Pleural fluid has no growth, most likely due to prior antibiotics  on board.          REVIEW OF SYSTEMS: Unable to obtain since intubated/sedated          ICU Vital Signs Last 24 Hrs  T(C): 37.1 (09 Dec 2017 13:30), Max: 38.2 (09 Dec 2017 11:00)  T(F): 98.7 (09 Dec 2017 13:30), Max: 100.7 (09 Dec 2017 11:00)  HR: 66 (09 Dec 2017 15:30) (62 - 112)  BP: 144/47 (09 Dec 2017 15:30) (61/27 - 148/70)  BP(mean): 70 (09 Dec 2017 15:30) (35 - 102)  ABP: --  ABP(mean): --  RR: 16 (09 Dec 2017 15:30) (14 - 33)  SpO2: 100% (09 Dec 2017 15:30) (87% - 100%)          PHYSICAL EXAM:    GENERAL: Intubated/sedated  HEENT: ET tube in placed  CVS: s1 and s2 present  RESP: Air entry B/L   GI: abdomen distended  EXT: No pedal  edema, Right hip staples in placed, site looks clean, no  redness  CNS: intubated/sedated              ALLERGIES: NKDA                LABS:                        10.8   21.9  )-----------( 432      ( 09 Dec 2017 06:43 )             34.3                           10.1   32.6  )-----------( 346      ( 08 Dec 2017 06:10 )             32.5                             9.7    40.2  )-----------( 280      ( 07 Dec 2017 06:36 )             31.6             12-    147<H>  |  106  |  9   ----------------------------<  82  3.3<L>   |  33<H>  |  0.30<L>    Ca    7.3<L>      09 Dec 2017 06:43  Phos  2.2     12-09  Mg     2.0     12-09                        TPro  5.3<L>  /  Alb  2.6<L>  /  TBili  0.6  /  DBili  x   /  AST  24  /  ALT  24  /  AlkPhos  65  12-04            PT/INR - ( 2017 11:02 )   PT: 17.7 sec;   INR: 1.61 ratio         PTT - ( 2017 11:02 )  PTT:41.2 sec  Urinalysis Basic - ( 2017 16:48 )    Color: Yellow / Appearance: Slightly Turbid / S.020 / pH: x  Gluc: x / Ketone: Small  / Bili: Negative / Urobili: 4   Blood: x / Protein: 100 / Nitrite: Negative   Leuk Esterase: Negative / RBC: 5-10 /HPF / WBC 3-5 /HPF   Sq Epi: x / Non Sq Epi: Moderate /HPF / Bacteria: Many /HPF                  MEDICATIONS  (STANDING):  dexmedetomidine Infusion 0.2 MICROgram(s)/kG/Hr (3 mL/Hr) IV Continuous <Continuous>  fentaNYL   Infusion 3 MICROgram(s)/kG/Hr (14.01 mL/Hr) IV Continuous <Continuous>  furosemide   Injectable 40 milliGRAM(s) IV Push every 12 hours  heparin  Injectable 5000 Unit(s) SubCutaneous every 8 hours  meropenem IVPB      meropenem IVPB 1000 milliGRAM(s) IV Intermittent every 8 hours  norepinephrine Infusion 0.5 MICROgram(s)/kG/Min (21.891 mL/Hr) IV Continuous <Continuous>  pantoprazole   Suspension 40 milliGRAM(s) Oral daily  propylthiouracil 50 milliGRAM(s) Oral daily  QUEtiapine 25 milliGRAM(s) Oral at bedtime    MEDICATIONS  (PRN):  acetaminophen    Suspension 650 milliGRAM(s) Oral every 6 hours PRN For Temp greater than 38 C (100.4 F)  fentaNYL    Injectable 100 MICROGram(s) IV Push every 2 hours PRN Severe Pain (7 - 10)                RADIOLOGY & ADDITIONAL TESTS:    17 : Xray Chest 1 View AP- PORTABLE-Urgent (17 @ 17:04) : Persistent mild pulmonary vascular congestive changes. Persistent small pleural effusions.  No pneumothorax.    Life supporting devices in appropriate position.          17: CT Abdomen and Pelvis w/ Oral Cont and w/ IV Cont (17 @ 18:36) : Nodular bilateral lung opacities and bilateral lower lobe consolidations,   suspicious for multifocal pneumonia.  Moderate bilateral pleural effusions.  BILE DUCTS: Biliary ductal dilatation.        17 : Xray Chest 1 View AP -PORTABLE-Routine (17 @ 09:34) : No change in small bilateral pleural effusions and right perihilar airspace disease.        17 : Xray Chest 1 View AP- PORTABLE-Urgent (17 @ 11:51) : Enteric tube has been advanced, below the left hemidiaphragm, tip collimated off the field-of-view. ET tube and left IJ catheter remain in  place. No pneumothorax. Persistent right midlung opacity. Mild diffuse interstitial prominence.  Small bilateral pleural effusions.          17 : Xray Chest 1 View AP/PA (17 @ 05:48) : Increased lung volumes consistent with COPD. Bones are again quite  demineralized. Endotracheal tube is in good position. Nasogastric tube courses into the abdominal area off the lower edge of the film. Heart is grossly normal in size.  Significant infiltrates are seen again bilaterally but most pronounced in the right perihilar area.      17 : Xray Chest 1 View AP -PORTABLE-Routine (17 @ 16:28) : Patchy right perihilar opacities, which may be due to pneumonia. Mild  diffuse interstitial prominence. Small bilateral pleural effusions.            MICROBIOLOGY DATA:    Culture - Body Fluid with Gram Stain (17 @ 03:29)    Gram Stain:   polymorphonuclear leukocytes seen  No organisms seen  by cytocentrifuge    Specimen Source: .Body Fluid Thoracentesis Fluid          Culture - Sputum . (17 @ 01:21)    Gram Stain:   Few polymorphonuclear leukocytes per low power field  Rare Squamous epithelial cells per low power field  Few Gram Positive Rods seen per oil power field    Specimen Source: .Sputum Sputum    Culture Results:   Normal Respiratory Jennifer present          Culture - Blood (17 @ 00:47)    -  Haemophilus influenzae: Detec    Gram Stain:   Growth in anaerobic bottle:  Gram Negative Coccobacilli    -  Ampicillin: S    -  Ceftriaxone: S    -  Chloramphenicol: S    -  Levofloxacin: S    -  Meropenem: S    Specimen Source: .Blood Blood-Venous    Organism: Blood Culture PCR    Organism: Haemophilus influenzae    Culture Results:   Growth in anaerobic bottle: Haemophilus influenzae  ***Blood Panel PCR results on this specimen are available  approximately 3 hours after the Gram stain result.***  Gram stain, PCR, and/or culture results may not always  correspond due to difference in methodologies.  ************************************************************  This PCR assay was performed using [employer].  The following targets are tested for: Enterococcus,  vancomycin resistant enterococci, Listeria monocytogenes,  coagulase negative staphylococci, S. aureus,  methicillin resistant S. aureus, Streptococcus agalactiae  (Group B), S. pneumoniae, S. pyogenes (Group A),  Acinetobacter baumannii, Enterobacter cloacae, E. coli,  Klebsiella oxytoca, K. pneumoniae, Proteus sp.,  Serratia marcescens, Haemophilus influenzae,  Neisseria meningitidis, Pseudomonas aeruginosa, Candida  albicans, C. glabrata, C krusei, C parapsilosis,  C. tropicalis and the KPC resistance gene.    Organism Identification: Blood Culture PCR  Haemophilus influenzae    Method Type: PCR    Method Type: KB          Culture - Sputum . (17 @ 11:55)    Gram Stain:   Moderate polymorphonuclear leukocytes per low power field  Rare Squamous epithelial cells per low power field  Rare Gram Positive Cocci per oil power field    Specimen Source: .Sputum Sputum - trap    Culture Results:   No growth      Culture - Blood (17 @ 10:13)    Specimen Source: .Blood Blood-Peripheral    Culture Results:   No growth to date.    Culture - Blood (17 @ 10:13)    Specimen Source: .Blood Blood-Peripheral    Culture Results:   No growth to date.        Legionella pneumophila Antigen, Urine (17 @ 17:17)    Legionella Antigen, Urine: Negative            Culture - Blood (17 @ 00:47)    Specimen Source: .Blood Blood-Peripheral    Culture Results:   No growth to date.      Culture - Urine (17 @ 00:19)    Specimen Source: .Urine Catheterized    Culture Results:   No growth          Rapid Respiratory Viral Panel (17 @ 19:15)    Rapid RVP Result: Detected: The [employer] RVP Rapid uses polymerase chain reaction (PCR) and melt  curve analysis to screen for adenovirus; coronavirus HKU1, NL63, 229E,  OC43; human metapneumovirus (hMPV); human enterovirus/rhinovirus  (Entero/RV); influenza A; influenza A/H1;influenza A/H3; influenza  A/H1-2009; influenza B; parainfluenza viruses 1, 2, 3, 4; respiratory  syncytial virus; Bordetella pertussis; Mycoplasma pneumoniae; and  Chlamydophila pneumoniae.

## 2017-12-09 NOTE — PROGRESS NOTE ADULT - ASSESSMENT
89 F from [facility] PM Right Femur Neck Fracture, DONELL, Hyperthyroidism (PTU), Constipation p/w difficulty breathing - admitted to ICU for hypoxic and hypercapnia respiratory failure and septic shock requiring pressors 2/2 PNA and bacteremic with H. influenza vs  Acute Systolic CHF EF 25%

## 2017-12-09 NOTE — PROGRESS NOTE ADULT - SUBJECTIVE AND OBJECTIVE BOX
· Subjective and Objective: 	  INTERVAL /OVERNIGHT EVENTS: no events overnight, underwent thora yesterday in hopes of helping to extubate, failed sbt this morning     PRESSORS: [x ] YES [ ] NO  WHICH: levophed    ANTIBIOTICS: meropenem                 DATE STARTED: 12/7    Antimicrobial:  meropenem IVPB      meropenem IVPB 1000 milliGRAM(s) IV Intermittent every 8 hours    Cardiovascular:  furosemide   Injectable 40 milliGRAM(s) IV Push every 12 hours  norepinephrine Infusion 0.5 MICROgram(s)/kG/Min IV Continuous <Continuous>    Pulmonary: N/A    Hematalogic:  heparin  Injectable 5000 Unit(s) SubCutaneous every 8 hours    Other:  acetaminophen    Suspension 650 milliGRAM(s) Oral every 6 hours PRN  dexmedetomidine Infusion 0.2 MICROgram(s)/kG/Hr IV Continuous <Continuous>  fentaNYL    Injectable 100 MICROGram(s) IV Push every 2 hours PRN  fentaNYL   Infusion 3 MICROgram(s)/kG/Hr IV Continuous <Continuous>  pantoprazole   Suspension 40 milliGRAM(s) Oral daily  propylthiouracil 50 milliGRAM(s) Oral daily  QUEtiapine 25 milliGRAM(s) Oral at bedtime    acetaminophen    Suspension 650 milliGRAM(s) Oral every 6 hours PRN  dexmedetomidine Infusion 0.2 MICROgram(s)/kG/Hr IV Continuous <Continuous>  fentaNYL    Injectable 100 MICROGram(s) IV Push every 2 hours PRN  fentaNYL   Infusion 3 MICROgram(s)/kG/Hr IV Continuous <Continuous>  furosemide   Injectable 40 milliGRAM(s) IV Push every 12 hours  heparin  Injectable 5000 Unit(s) SubCutaneous every 8 hours  meropenem IVPB      meropenem IVPB 1000 milliGRAM(s) IV Intermittent every 8 hours  norepinephrine Infusion 0.5 MICROgram(s)/kG/Min IV Continuous <Continuous>  pantoprazole   Suspension 40 milliGRAM(s) Oral daily  propylthiouracil 50 milliGRAM(s) Oral daily  QUEtiapine 25 milliGRAM(s) Oral at bedtime    Drug Dosing Weight  Height (cm): 144.78 (28 Nov 2017 18:44)  Weight (kg): 46.7 (28 Nov 2017 18:44)  BMI (kg/m2): 22.3 (28 Nov 2017 18:44)  BSA (m2): 1.36 (28 Nov 2017 18:44)    CENTRAL LINE: [x ] YES [ ] NO  LOCATION: [facility]  DATE INSERTED: 11/29  REMOVE: [x ] YES [ ] NO  EXPLAIN: greater than 7 days of insertion    MORALES: [x ] YES [ ] NO    DATE INSERTED: 11/28  REMOVE:  [ ] YES [x ] NO  EXPLAIN: urine output monitoring    A-LINE:  [ ] YES [x ] NO      ICU Vital Signs Last 24 Hrs  T(C): 37.3 (08 Dec 2017 23:30), Max: 38.8 (08 Dec 2017 12:30)  T(F): 99.1 (08 Dec 2017 23:30), Max: 101.9 (08 Dec 2017 12:30)  HR: 88 (09 Dec 2017 00:10) (62 - 196)  BP: 147/50 (08 Dec 2017 21:30) (60/30 - 151/52)  BP(mean): 73 (08 Dec 2017 21:30) (35 - 179)  RR: 23 (08 Dec 2017 21:30) (14 - 28)  SpO2: 100% (09 Dec 2017 00:10) (93% - 100%)      ABG - ( 08 Dec 2017 05:13 )  pH: 7.48  /  pCO2: 52    /  pO2: 99    / HCO3: 38    / Base Excess: 12.9  /  SaO2: 98                    12-07 @ 07:01  -  12-08 @ 07:00  --------------------------------------------------------  IN: 1685.9 mL / OUT: 4260 mL / NET: -2574.1 mL        Mode: AC/ CMV (Assist Control/ Continuous Mandatory Ventilation)  RR (machine): 16  TV (machine): 400  FiO2: 40  PEEP: 5  ITime: 1  MAP: 13  PIP: 28      PHYSICAL EXAM:    GENERAL:   HEAD: atraumatic, normocephalic   EYES: PERRLA, white sclera.   ENMT: nasal mucosa- Oral cavity-   NECK: supple, JVD/ no JVD, thyroid-   LYMPH: no palpable lymph nodes     SKIN: warm, dry   CHEST/LUNG: ET tube: size        cm at lip. No Chest deformity , no chest tenderness. bilateral breath sounds,no  adventitious sounds  HEART: RRR, no m/r/g   ABDOMEN: soft, nontender, nondistended; bowel sounds.  :morales catheter.  EXTREMITIES: +1 non-pitting edema, no cyanosis, no clubbing.  NEURO: AA0X , mood/ affect-, no focal neuro deficits        LABS:  CBC Full  -  ( 08 Dec 2017 06:10 )  WBC Count : 32.6 K/uL  Hemoglobin : 10.1 g/dL  Hematocrit : 32.5 %  Platelet Count - Automated : 346 K/uL  Mean Cell Volume : 100.7 fl  Mean Cell Hemoglobin : 31.4 pg  Mean Cell Hemoglobin Concentration : 31.2 gm/dL  Auto Neutrophil # : x  Auto Lymphocyte # : x  Auto Monocyte # : x  Auto Eosinophil # : x  Auto Basophil # : x  Auto Neutrophil % : 93.0 %  Auto Lymphocyte % : 4.0 %  Auto Monocyte % : 3.0 %  Auto Eosinophil % : x  Auto Basophil % : x    12-08    143  |  104  |  7   ----------------------------<  88  3.4<L>   |  36<H>  |  <0.20<L>    Ca    7.9<L>      08 Dec 2017 06:10  Phos  1.8     12-08  Mg     2.2     12-08          Culture Results:   Normal Respiratory Jennifer present (12-08 @ 01:21)  Culture Results:   No growth to date. (12-07 @ 17:37)  Culture Results:   No growth to date. (12-07 @ 17:37)  Culture Results:   Normal Respiratory Jennifer present (12-07 @ 17:33)      RADIOLOGY & ADDITIONAL STUDIES REVIEWED:     [ ]GOALS OF CARE DISCUSSION WITH PATIENT/FAMILY/PROXY:    CRITICAL CARE TIME SPENT: 35 minutes   INTERVAL /OVERNIGHT EVENTS: no events overnight    PRESSORS: [x ] YES [ ] NO  WHICH: levophed    ANTIBIOTICS: meropenem                 DATE STARTED: 12/7    Antimicrobial:  meropenem IVPB      meropenem IVPB 1000 milliGRAM(s) IV Intermittent every 8 hours    Cardiovascular:  furosemide   Injectable 40 milliGRAM(s) IV Push every 12 hours  norepinephrine Infusion 0.5 MICROgram(s)/kG/Min IV Continuous <Continuous>    Pulmonary: N/A    Hematalogic:  heparin  Injectable 5000 Unit(s) SubCutaneous every 8 hours    Other:  acetaminophen    Suspension 650 milliGRAM(s) Oral every 6 hours PRN  dexmedetomidine Infusion 0.2 MICROgram(s)/kG/Hr IV Continuous <Continuous>  fentaNYL    Injectable 100 MICROGram(s) IV Push every 2 hours PRN  fentaNYL   Infusion 3 MICROgram(s)/kG/Hr IV Continuous <Continuous>  pantoprazole   Suspension 40 milliGRAM(s) Oral daily  propylthiouracil 50 milliGRAM(s) Oral daily  QUEtiapine 25 milliGRAM(s) Oral at bedtime    acetaminophen    Suspension 650 milliGRAM(s) Oral every 6 hours PRN  dexmedetomidine Infusion 0.2 MICROgram(s)/kG/Hr IV Continuous <Continuous>  fentaNYL    Injectable 100 MICROGram(s) IV Push every 2 hours PRN  fentaNYL   Infusion 3 MICROgram(s)/kG/Hr IV Continuous <Continuous>  furosemide   Injectable 40 milliGRAM(s) IV Push every 12 hours  heparin  Injectable 5000 Unit(s) SubCutaneous every 8 hours  meropenem IVPB      meropenem IVPB 1000 milliGRAM(s) IV Intermittent every 8 hours  norepinephrine Infusion 0.5 MICROgram(s)/kG/Min IV Continuous <Continuous>  pantoprazole   Suspension 40 milliGRAM(s) Oral daily  propylthiouracil 50 milliGRAM(s) Oral daily  QUEtiapine 25 milliGRAM(s) Oral at bedtime    Drug Dosing Weight  Height (cm): 144.78 (28 Nov 2017 18:44)  Weight (kg): 46.7 (28 Nov 2017 18:44)  BMI (kg/m2): 22.3 (28 Nov 2017 18:44)  BSA (m2): 1.36 (28 Nov 2017 18:44)    CENTRAL LINE: [x ] YES [ ] NO  LOCATION: [facility]  DATE INSERTED: 11/29  REMOVE: [x ] YES [ ] NO  EXPLAIN: greater than 7 days of insertion    MORALES: [x ] YES [ ] NO    DATE INSERTED: 11/28  REMOVE:  [ ] YES [x ] NO  EXPLAIN: urine output monitoring    A-LINE:  [ ] YES [x ] NO      ICU Vital Signs Last 24 Hrs  T(C): 37.3 (08 Dec 2017 23:30), Max: 38.8 (08 Dec 2017 12:30)  T(F): 99.1 (08 Dec 2017 23:30), Max: 101.9 (08 Dec 2017 12:30)  HR: 88 (09 Dec 2017 00:10) (62 - 196)  BP: 147/50 (08 Dec 2017 21:30) (60/30 - 151/52)  BP(mean): 73 (08 Dec 2017 21:30) (35 - 179)  RR: 23 (08 Dec 2017 21:30) (14 - 28)  SpO2: 100% (09 Dec 2017 00:10) (93% - 100%)      ABG - ( 08 Dec 2017 05:13 )  pH: 7.48  /  pCO2: 52    /  pO2: 99    / HCO3: 38    / Base Excess: 12.9  /  SaO2: 98                    12-07 @ 07:01  -  12-08 @ 07:00  --------------------------------------------------------  IN: 1685.9 mL / OUT: 4260 mL / NET: -2574.1 mL        Mode: AC/ CMV (Assist Control/ Continuous Mandatory Ventilation)  RR (machine): 16  TV (machine): 400  FiO2: 40  PEEP: 5  ITime: 1  MAP: 13  PIP: 28      PHYSICAL EXAM:    GENERAL:   HEAD: atraumatic, normocephalic   EYES: PERRLA, white sclera.   ENMT: nasal mucosa- Oral cavity-   NECK: supple, JVD/ no JVD, thyroid-   LYMPH: no palpable lymph nodes     SKIN: warm, dry   CHEST/LUNG: ET tube: size        cm at lip. No Chest deformity , no chest tenderness. bilateral breath sounds,no  adventitious sounds  HEART: RRR, no m/r/g   ABDOMEN: soft, nontender, nondistended; bowel sounds.  :morales catheter.  EXTREMITIES: +1 non-pitting edema, no cyanosis, no clubbing.  NEURO: AA0X , mood/ affect-, no focal neuro deficits        LABS:  CBC Full  -  ( 08 Dec 2017 06:10 )  WBC Count : 32.6 K/uL  Hemoglobin : 10.1 g/dL  Hematocrit : 32.5 %  Platelet Count - Automated : 346 K/uL  Mean Cell Volume : 100.7 fl  Mean Cell Hemoglobin : 31.4 pg  Mean Cell Hemoglobin Concentration : 31.2 gm/dL  Auto Neutrophil # : x  Auto Lymphocyte # : x  Auto Monocyte # : x  Auto Eosinophil # : x  Auto Basophil # : x  Auto Neutrophil % : 93.0 %  Auto Lymphocyte % : 4.0 %  Auto Monocyte % : 3.0 %  Auto Eosinophil % : x  Auto Basophil % : x    12-08    143  |  104  |  7   ----------------------------<  88  3.4<L>   |  36<H>  |  <0.20<L>    Ca    7.9<L>      08 Dec 2017 06:10  Phos  1.8     12-08  Mg     2.2     12-08          Culture Results:   Normal Respiratory Jennifer present (12-08 @ 01:21)  Culture Results:   No growth to date. (12-07 @ 17:37)  Culture Results:   No growth to date. (12-07 @ 17:37)  Culture Results:   Normal Respiratory Jennifer present (12-07 @ 17:33)      RADIOLOGY & ADDITIONAL STUDIES REVIEWED:     [ ]GOALS OF CARE DISCUSSION WITH PATIENT/FAMILY/PROXY:    CRITICAL CARE TIME SPENT: 35 minutes     Assessment and Plan:   · Assessment		  89 F from [facility] Right Femur Neck Fracture, DONELL, Hyperthyroidism (PTU), Constipation p/w difficulty breathing - admitted to ICU for hypoxic and hypercapnia respiratory failure and septic shock requiring pressors 2/2 PNA and bacteremic with H. influenza vs  Acute Systolic CHF EF 25%     Problem/Plan - 1:  ·  Problem: Septic shock.  Plan: 2/2 RLL pneumonia and bacteremic 2/2 H. influenzae, +RVP  - No leukocytosis, afebrile overnight, and off pressors  - C/w Precedex and PRN Fentanyl for sedation  - BCx grew H. influenza sensitive to Rocephin  - Repeat BCx/Sputum 11/30 NGTD, repeat Sputum GPR/GNR  - Concern for fluid overload; began IV Lasix; monitor UO and AM CXR  - C/w taper Levophed.      Problem/Plan - 2:  ·  Problem: Leukocytosis.  Plan: Unknown cause, BCx NGTD and H. influenza sensitive to Rocephin  - CT chest/abd/pelvis shows multifocal PNA and moderate bilateral infusions  ***Thoracentesis L - 850 blood tinged/cloudy fluid drained, f/u results/Cx  - Meropenem Day 2  ID Garrison.      Problem/Plan - 3:  ·  Problem: Respiratory failure.  Plan: Dependent on the ventilator  - Failed SBT   - Sedation w/ Precedex and Fentanyl PRN.      Problem/Plan - 4:  ·  Problem: Diarrhea.  Plan: Likely 2/2 tube feeds  - Holding x 24 hours  ***If diarrhea improved, no need to test for C. diff; overall infection likely related to multifocal PNA.      Problem/Plan - 5:  ·  Problem: Acute systolic congestive heart failure.  Plan: Prior TTE 11/8 moderate pulmonary HTN and normal EF  - Repeat TTE shows severe segmental systolic HF w/ EF 25  ***Began IV Lasix, AM CXR showed worsening congestion.      Problem/Plan - 6:  Problem: EKG abnormalities. Plan: EKG - LVH, LAD, incomplete LBBB, QRS /CE elevated to 1.5 then dropped to 1.0  - Acute onset of Afib; given one dose amiodarone and converted back to sinus rhythm  - C/w ASA and Atorvastatin  Cardiology Dr. Garrison.     Problem/Plan - 7:  ·  Problem: Prophylactic measure.  Plan: IMPROVE score 2 2/2 age and immobilization - DVT PPx w/ HSQ  - GI PPx w/ Protonix.

## 2017-12-09 NOTE — PROGRESS NOTE ADULT - ASSESSMENT
A 88 yo Female with right femur neck fx, difficulty walking,  brought in to ER by EMS from [facility] for  evaluation of  difficulty of breathing. In the ER, she found to be tachycardic, tachypneic up to 50's, hypotensive to SBP of 50's and lethargic and required intubation. She also has leukocytosis and chest xray shows right sided pneumonia. She has started on Levaquin, Vancomycin and zosyn, cultures pending. The ID consult requested to assist with further evaluation and antibiotic  management.       # Septic shock- off pressor  # Multifocal pneumonia  # RSV  # Bacteremia-  Haemophilus influenzae  # Repeat Blood culture- No growth to date 11/30/17  # Biliary ductal dilatation   # B/L Moderate pleural effusion- s/p Right thoracentesis 12/8/17      Would recommend:  1. Monitor WBC count, is trending  back down  2. Continue  Meropenem 1 g Q 8hours in the setting of biliary ductal dilatation  3. Weaning trial as per ICU protocol  4. Supportive care for RSV and Isolation as per Hospital policy        d/w ICU team    will follow the patient with you

## 2017-12-10 NOTE — PROGRESS NOTE ADULT - SUBJECTIVE AND OBJECTIVE BOX
Patient is seen and examined at the bed side, remains afebrile.   She is s/p  extubation today but has copious bronchial secretions. The Leukocytosis continue trending down. The Blood pressure is low due to fentanyl drip for agitation.            REVIEW OF SYSTEMS: Unable to obtain since sedated          ICU Vital Signs Last 24 Hrs  T(C): 37.2 (10 Dec 2017 15:20), Max: 37.2 (09 Dec 2017 21:00)  T(F): 99 (10 Dec 2017 15:20), Max: 99 (09 Dec 2017 21:00)  HR: 104 (10 Dec 2017 18:00) (66 - 125)  BP: 129/76 (10 Dec 2017 18:00) (82/33 - 168/95)  BP(mean): 82 (10 Dec 2017 18:00) (42 - 118)  ABP: --  ABP(mean): --  RR: 29 (10 Dec 2017 18:00) (16 - 47)  SpO2: 99% (10 Dec 2017 18:00) (88% - 100%)            PHYSICAL EXAM:    GENERAL: Extubated , on oxygen via nasal canula  CVS: s1 and s2 present  RESP: Air entry B/L   GI: abdomen distended  EXT: No pedal  edema, Right hip staples in placed, site looks clean, no  redness  CNS: sedated due to agitation              ALLERGIES: NKDA                LABS:                        9.6    18.3  )-----------( 445      ( 10 Dec 2017 07:00 )             31.0                           10.8   21.9  )-----------( 432      ( 09 Dec 2017 06:43 )             34.3                           10.1   32.6  )-----------( 346      ( 08 Dec 2017 06:10 )             32.5                             9.7    40.2  )-----------( 280      ( 07 Dec 2017 06:36 )             31.6           12-10    145  |  103  |  13  ----------------------------<  111<H>  3.3<L>   |  37<H>  |  0.24<L>    Ca    7.9<L>      10 Dec 2017 07:00  Phos  1.7     12-10  Mg     2.3     12-10        12-09    147<H>  |  106  |  9   ----------------------------<  82  3.3<L>   |  33<H>  |  0.30<L>    Ca    7.3<L>      09 Dec 2017 06:43  Phos  2.2       Mg     2.0                             TPro  5.3<L>  /  Alb  2.6<L>  /  TBili  0.6  /  DBili  x   /  AST  24  /  ALT  24  /  AlkPhos  65  12            PT/INR - ( 2017 11:02 )   PT: 17.7 sec;   INR: 1.61 ratio         PTT - ( 2017 11:02 )  PTT:41.2 sec  Urinalysis Basic - ( 2017 16:48 )    Color: Yellow / Appearance: Slightly Turbid / S.020 / pH: x  Gluc: x / Ketone: Small  / Bili: Negative / Urobili: 4   Blood: x / Protein: 100 / Nitrite: Negative   Leuk Esterase: Negative / RBC: 5-10 /HPF / WBC 3-5 /HPF   Sq Epi: x / Non Sq Epi: Moderate /HPF / Bacteria: Many /HPF                  MEDICATIONS  (STANDING):  acetazolamide  IVPB 250 milliGRAM(s) IV Intermittent every 12 hours  fentaNYL   Infusion 4 MICROgram(s)/kG/Hr (18.68 mL/Hr) IV Continuous <Continuous>  heparin  Injectable 5000 Unit(s) SubCutaneous every 8 hours  meropenem IVPB      meropenem IVPB 1000 milliGRAM(s) IV Intermittent every 8 hours  pantoprazole   Suspension 40 milliGRAM(s) Oral daily  propylthiouracil 50 milliGRAM(s) Oral daily  QUEtiapine 25 milliGRAM(s) Oral at bedtime  scopolamine   Patch 1.5 milliGRAM(s) Transdermal every 3 days    MEDICATIONS  (PRN):  acetaminophen    Suspension 650 milliGRAM(s) Oral every 6 hours PRN For Temp greater than 38 C (100.4 F)  fentaNYL    Injectable 50 MICROGram(s) IV Push every 4 hours PRN agitation  glycopyrrolate Injectable 0.2 milliGRAM(s) IV Push every 6 hours PRN secretions  LORazepam   Injectable 1 milliGRAM(s) IV Push every 4 hours PRN Agitation  morphine  - Injectable 2 milliGRAM(s) IV Push every 3 hours PRN dyspnea              RADIOLOGY & ADDITIONAL TESTS:    17 : Xray Chest 1 View AP- PORTABLE-Urgent (17 @ 17:04) : Persistent mild pulmonary vascular congestive changes. Persistent small pleural effusions.  No pneumothorax.    Life supporting devices in appropriate position.          17: CT Abdomen and Pelvis w/ Oral Cont and w/ IV Cont (17 @ 18:36) : Nodular bilateral lung opacities and bilateral lower lobe consolidations,   suspicious for multifocal pneumonia.  Moderate bilateral pleural effusions.  BILE DUCTS: Biliary ductal dilatation.        17 : Xray Chest 1 View AP -PORTABLE-Routine (17 @ 09:34) : No change in small bilateral pleural effusions and right perihilar airspace disease.        17 : Xray Chest 1 View AP- PORTABLE-Urgent (17 @ 11:51) : Enteric tube has been advanced, below the left hemidiaphragm, tip collimated off the field-of-view. ET tube and left IJ catheter remain in  place. No pneumothorax. Persistent right midlung opacity. Mild diffuse interstitial prominence.  Small bilateral pleural effusions.          17 : Xray Chest 1 View AP/PA (17 @ 05:48) : Increased lung volumes consistent with COPD. Bones are again quite  demineralized. Endotracheal tube is in good position. Nasogastric tube courses into the abdominal area off the lower edge of the film. Heart is grossly normal in size.  Significant infiltrates are seen again bilaterally but most pronounced in the right perihilar area.      17 : Xray Chest 1 View AP -PORTABLE-Routine (17 @ 16:28) : Patchy right perihilar opacities, which may be due to pneumonia. Mild  diffuse interstitial prominence. Small bilateral pleural effusions.            MICROBIOLOGY DATA:    Culture - Body Fluid with Gram Stain (17 @ 03:29)    Gram Stain:   polymorphonuclear leukocytes seen  No organisms seen  by cytocentrifuge    Specimen Source: .Body Fluid Thoracentesis Fluid          Culture - Sputum . (17 @ 01:21)    Gram Stain:   Few polymorphonuclear leukocytes per low power field  Rare Squamous epithelial cells per low power field  Few Gram Positive Rods seen per oil power field    Specimen Source: .Sputum Sputum    Culture Results:   Normal Respiratory Jennifer present          Culture - Blood (17 @ 00:47)    -  Haemophilus influenzae: Detec    Gram Stain:   Growth in anaerobic bottle:  Gram Negative Coccobacilli    -  Ampicillin: S    -  Ceftriaxone: S    -  Chloramphenicol: S    -  Levofloxacin: S    -  Meropenem: S    Specimen Source: .Blood Blood-Venous    Organism: Blood Culture PCR    Organism: Haemophilus influenzae    Culture Results:   Growth in anaerobic bottle: Haemophilus influenzae  ***Blood Panel PCR results on this specimen are available  approximately 3 hours after the Gram stain result.***  Gram stain, PCR, and/or culture results may not always  correspond due to difference in methodologies.  ************************************************************  This PCR assay was performed using [employer].  The following targets are tested for: Enterococcus,  vancomycin resistant enterococci, Listeria monocytogenes,  coagulase negative staphylococci, S. aureus,  methicillin resistant S. aureus, Streptococcus agalactiae  (Group B), S. pneumoniae, S. pyogenes (Group A),  Acinetobacter baumannii, Enterobacter cloacae, E. coli,  Klebsiella oxytoca, K. pneumoniae, Proteus sp.,  Serratia marcescens, Haemophilus influenzae,  Neisseria meningitidis, Pseudomonas aeruginosa, Candida  albicans, C. glabrata, C krusei, C parapsilosis,  C. tropicalis and the KPC resistance gene.    Organism Identification: Blood Culture PCR  Haemophilus influenzae    Method Type: PCR    Method Type: KB          Culture - Sputum . (17 @ 11:55)    Gram Stain:   Moderate polymorphonuclear leukocytes per low power field  Rare Squamous epithelial cells per low power field  Rare Gram Positive Cocci per oil power field    Specimen Source: .Sputum Sputum - trap    Culture Results:   No growth      Culture - Blood (17 @ 10:13)    Specimen Source: .Blood Blood-Peripheral    Culture Results:   No growth to date.    Culture - Blood (17 @ 10:13)    Specimen Source: .Blood Blood-Peripheral    Culture Results:   No growth to date.        Legionella pneumophila Antigen, Urine (17 @ 17:17)    Legionella Antigen, Urine: Negative            Culture - Blood (17 @ 00:47)    Specimen Source: .Blood Blood-Peripheral    Culture Results:   No growth to date.      Culture - Urine (17 @ 00:19)    Specimen Source: .Urine Catheterized    Culture Results:   No growth          Rapid Respiratory Viral Panel (17 @ 19:15)    Rapid RVP Result: Detected: The [employer] RVP Rapid uses polymerase chain reaction (PCR) and melt  curve analysis to screen for adenovirus; coronavirus HKU1, NL63, 229E,  OC43; human metapneumovirus (hMPV); human enterovirus/rhinovirus  (Entero/RV); influenza A; influenza A/H1;influenza A/H3; influenza  A/H1-2009; influenza B; parainfluenza viruses 1, 2, 3, 4; respiratory  syncytial virus; Bordetella pertussis; Mycoplasma pneumoniae; and  Chlamydophila pneumoniae.

## 2017-12-10 NOTE — PROGRESS NOTE ADULT - PROBLEM SELECTOR PLAN 7
IMPROVE score 2 2/2 age and immobilization - DVT PPx w/ HSQ  - GI PPx w/ Protonix

## 2017-12-10 NOTE — CHART NOTE - NSCHARTNOTEFT_GEN_A_CORE
patient successfully weaned from vent and extubated this AM, seems to be doing OK right now on aerosol mask, still on precedex.  Plan:  -will continue precedex, start PRN morphine for dyspnea, d/c fentanyl infusion  -no escalation of care, no pressors, no re-intubation

## 2017-12-10 NOTE — PROGRESS NOTE ADULT - PROBLEM SELECTOR PLAN 2
Unknown cause, BCx NGTD and H. influenza sensitive to Rocephin  - CT chest/abd/pelvis shows multifocal PNA and moderate bilateral infusions  ***Thoracentesis L - 850 blood tinged/cloudy fluid drained, f/u results/Cx which is exudative likely para-pneumonic effusion  - Meropenem Day 2  ID Amin

## 2017-12-10 NOTE — PROGRESS NOTE ADULT - SUBJECTIVE AND OBJECTIVE BOX
· Subjective and Objective: 	  INTERVAL /OVERNIGHT EVENTS: no events overnight, underwent thora yesterday in hopes of helping to extubate, failed sbt this morning     PRESSORS: [x ] YES [ ] NO  WHICH: levophed    ANTIBIOTICS: meropenem                 DATE STARTED: 12/7    Antimicrobial:  meropenem IVPB      meropenem IVPB 1000 milliGRAM(s) IV Intermittent every 8 hours    Cardiovascular:  furosemide   Injectable 40 milliGRAM(s) IV Push every 12 hours  norepinephrine Infusion 0.5 MICROgram(s)/kG/Min IV Continuous <Continuous>    Pulmonary: N/A    Hematalogic:  heparin  Injectable 5000 Unit(s) SubCutaneous every 8 hours    Other:  acetaminophen    Suspension 650 milliGRAM(s) Oral every 6 hours PRN  dexmedetomidine Infusion 0.2 MICROgram(s)/kG/Hr IV Continuous <Continuous>  fentaNYL    Injectable 100 MICROGram(s) IV Push every 2 hours PRN  fentaNYL   Infusion 3 MICROgram(s)/kG/Hr IV Continuous <Continuous>  pantoprazole   Suspension 40 milliGRAM(s) Oral daily  propylthiouracil 50 milliGRAM(s) Oral daily  QUEtiapine 25 milliGRAM(s) Oral at bedtime    acetaminophen    Suspension 650 milliGRAM(s) Oral every 6 hours PRN  dexmedetomidine Infusion 0.2 MICROgram(s)/kG/Hr IV Continuous <Continuous>  fentaNYL    Injectable 100 MICROGram(s) IV Push every 2 hours PRN  fentaNYL   Infusion 3 MICROgram(s)/kG/Hr IV Continuous <Continuous>  furosemide   Injectable 40 milliGRAM(s) IV Push every 12 hours  heparin  Injectable 5000 Unit(s) SubCutaneous every 8 hours  meropenem IVPB      meropenem IVPB 1000 milliGRAM(s) IV Intermittent every 8 hours  norepinephrine Infusion 0.5 MICROgram(s)/kG/Min IV Continuous <Continuous>  pantoprazole   Suspension 40 milliGRAM(s) Oral daily  propylthiouracil 50 milliGRAM(s) Oral daily  QUEtiapine 25 milliGRAM(s) Oral at bedtime    Drug Dosing Weight  Height (cm): 144.78 (28 Nov 2017 18:44)  Weight (kg): 46.7 (28 Nov 2017 18:44)  BMI (kg/m2): 22.3 (28 Nov 2017 18:44)  BSA (m2): 1.36 (28 Nov 2017 18:44)    CENTRAL LINE: [x ] YES [ ] NO  LOCATION: [facility]  DATE INSERTED: 11/29  REMOVE: [x ] YES [ ] NO  EXPLAIN: greater than 7 days of insertion    MORALES: [x ] YES [ ] NO    DATE INSERTED: 11/28  REMOVE:  [ ] YES [x ] NO  EXPLAIN: urine output monitoring          Mode: AC/ CMV (Assist Control/ Continuous Mandatory Ventilation)  RR (machine): 16  TV (machine): 400  FiO2: 40  ICU Vital Signs Last 24 Hrs  T(C): 37.6 (09 Dec 2017 17:22), Max: 38.2 (09 Dec 2017 11:00)  T(F): 99.6 (09 Dec 2017 17:22), Max: 100.7 (09 Dec 2017 11:00)  HR: 93 (09 Dec 2017 20:47) (63 - 133)  BP: 123/52 (09 Dec 2017 19:00) (83/65 - 148/70)  BP(mean): 68 (09 Dec 2017 19:00) (53 - 108)  ABP: --  ABP(mean): --  RR: 22 (09 Dec 2017 19:00) (16 - 33)  SpO2: 100% (09 Dec 2017 20:47) (87% - 100%)  PEEP: 5  ITime: 1  MAP: 13  PIP: 28      PHYSICAL EXAM:    GENERAL: sedated, intubated  HEAD: atraumatic, normocephalic   EYES: PERRLA, white sclera.   ENMT: nasal mucosa- Oral cavity- dry  NECK: supple, no JVD, thyroid- not palpable  LYMPH: no palpable lymph nodes     SKIN: warm, dry   CHEST/LUNG: ET tube: size        cm at lip. No Chest deformity , no chest tenderness. bilateral breath sounds,no  adventitious sounds  HEART: RRR, no m/r/g   ABDOMEN: soft, nontender, nondistended; bowel sounds+  :morales catheter.  EXTREMITIES:no-pitting edema, no cyanosis, no clubbing.  NEURO: sedated        LABS:  CBC Full  -  ( 08 Dec 2017 06:10 )  WBC Count : 32.6 K/uL  Hemoglobin : 10.1 g/dL  Hematocrit : 32.5 %  Platelet Count - Automated : 346 K/uL  Mean Cell Volume : 100.7 fl  Mean Cell Hemoglobin : 31.4 pg  Mean Cell Hemoglobin Concentration : 31.2 gm/dL  Auto Neutrophil # : x  Auto Lymphocyte # : x  Auto Monocyte # : x  Auto Eosinophil # : x  Auto Basophil # : x  Auto Neutrophil % : 93.0 %  Auto Lymphocyte % : 4.0 %  Auto Monocyte % : 3.0 %  Auto Eosinophil % : x  Auto Basophil % : x    12-08    143  |  104  |  7   ----------------------------<  88  3.4<L>   |  36<H>  |  <0.20<L>    Ca    7.9<L>      08 Dec 2017 06:10  Phos  1.8     12-08  Mg     2.2     12-08          Culture Results:   Normal Respiratory Jennifer present (12-08 @ 01:21)  Culture Results:   No growth to date. (12-07 @ 17:37)  Culture Results:   No growth to date. (12-07 @ 17:37)  Culture Results:   Normal Respiratory Jennifer present (12-07 @ 17:33)      RADIOLOGY & ADDITIONAL STUDIES REVIEWED:     [ ]GOALS OF CARE DISCUSSION WITH PATIENT/FAMILY/PROXY:    CRITICAL CARE TIME SPENT: 35 minutes    Assessment and Plan:   · Assessment		  89 F from [facility] Right Femur Neck Fracture, DONELL, Hyperthyroidism (PTU), Constipation p/w difficulty breathing - admitted to ICU for hypoxic and hypercapnia respiratory failure and septic shock requiring pressors 2/2 PNA and bacteremic with H. influenza vs  Acute Systolic CHF EF 25%     Problem/Plan - 1:  ·  Problem: Septic shock.  Plan: 2/2 RLL pneumonia and bacteremic 2/2 H. influenzae, +RVP  leucocytosis persistent, c/w Abx, afebrile overnight  BCx grew H. influenza sensitive to Rocephin  Repeat BCx/Sputum 11/30 NGTD, repeat Sputum GPR/GNR  -c/w pressors and taper as tolerated.      Problem/Plan - 2:  ·  Problem: Leukocytosis.  Plan: Unknown cause, BCx NGTD and H. influenza sensitive to Rocephin  - CT chest/abd/pelvis shows multifocal PNA and moderate bilateral infusions  ***Thoracentesis L - 850 blood tinged/cloudy fluid drained, f/u results/Cx which is exudative likely para-pneumonic effusion  - Meropenem Day 2  ID Garrison.      Problem/Plan - 3:  ·  Problem: Respiratory failure.  Plan: c/w ventilatory support.      Problem/Plan - 4:  ·  Problem: Diarrhea.  Plan: resolved.      Problem/Plan - 5:  ·  Problem: Acute systolic congestive heart failure.  Plan: Prior TTE 11/8 moderate pulmonary HTN and normal EF  Repeat TTE shows severe segmental systolic HF w/ EF 25  On IV lasix Q12.      Problem/Plan - 6:  Problem: EKG abnormalities. Plan: EKG - LVH, LAD, incomplete LBBB, QRS /CE elevated to 1.5 then dropped to 1.0  - Acute onset of Afib; given one dose amiodarone and converted back to sinus rhythm  - C/w ASA and Atorvastatin  Cardiology Dr. Garrison.     Problem/Plan - 7:  ·  Problem: Prophylactic measure.  Plan: IMPROVE score 2 2/2 age and immobilization - DVT PPx w/ HSQ  - GI PPx w/ Protonix.

## 2017-12-10 NOTE — CHART NOTE - NSCHARTNOTEFT_GEN_A_CORE
patient has been on spontaneous breathing trial for past 30 minutes, seems to be doing well, saturation 95%, will attempt extubation as this may be the best window of opportunity.

## 2017-12-10 NOTE — PROGRESS NOTE ADULT - ATTENDING COMMENTS
due to patient multiple co-morbidities such as copd, CHF and continued respiratory failure despite diligent ICU level of care, 2PC decision for no attempt in cardiac resuscitation is medically reasonable to not cause further burden. Also if pt is able to successfully be weaned off the artificial ventilator, it is also medically reasonable to not reintubate to prolong pt's life artificially with such poor prognosis. I agree with 2PC decision for no reintubation.   This was carefully reviewed and discussed with Dr. Espana and DNR form was filled out.
89 female with hx of hyperthyroidism, anemia, presented with acute resp failure, RSV infection, H flu bacteremia, pneumonia, acute CHFrEF/severe cardiomyopathy, NSTEMI.    Failed SBT this AM, overall prognosis poor.   Total CC time 35 min.
Patient was intubated in the ED, tachycardic to 110-130's in the ED, BP improved to 156/72 after starting levophed,  No leucocytosis, but has a left shift, BMP shows hypernatremia to 151, elevated BUN to 45, lactate is 3.6. MCV elevated to 104.1.   ABG on s/p intubation on FiO2 of 100%, TV: 400, PEEP of 5 is pH: 7.22, PaCO2: 66, PaO2: 95, Bicarb: 26, O2 Sat of 100%  s/p 2L NS bolus and vanc and zosyn in the ED  CXR: Patchy right perihilar opacities, which may be due to pneumonia. Mild diffuse interstitial prominence. Small bilateral pleural effusions.  EKG - LVH , LAD , incomplete LBBB, QRS     Admitted to ICU for hypoxic and hypercapnia respiratory failure and septic shock requiring pressors 2/2 PNA found to be bacteremic with H. influenza           Problem/Plan - 1:  ·  Problem: Septic shock.  Plan: 2/2 RLL pneumonia, found to be bacteremic 2/2 H. influenzae (gram neg.)  no leucocytosis, afebrile overnight Tmax 100.2  c/w LR @ 100 cc  c/w fentanyl, precedex , titrate for RASS   c/w phenylephrine , titrate for MAP of 65  lactate trended down to 1.7 on 11/30  RVP positive  legionella - negative, d/c'ed levaquin  BCx positive for gram negative coccobacillus - Haemophilus influenzae, c/w zosyn per ID recommendation, awaiting sensitivity  Ucx negative to date  CVP low, gave another 2-3 L bolus of lactate due to low CVP of 8  c/w LR @100ml/hr  titrating to CVP of 12-14.      Problem/Plan - 2:  ·  Problem: Respiratory failure.  Plan: hypoxic and hypercapnia respiratory failure 2/2 RLL pneumonia, found to be bacteremic with H. influenza, RVP positive  BG on s/p intubation on FiO2 of 100%, TV: 400, PEEP of 5 is   pH: 7.22, PaCO2: 66, PaO2: 95, Bicarb: 26, O2 Sat of 100%  c/w sedation , titrate for RASS score of -2  c/w levophed , titrate for MAP of 65.      Problem/Plan - 3:  ·  Problem: EKG abnormalities.  Plan: EKG - LVH , LAD , incomplete LBBB, QRS   T1 T2 elevated at 1.5, trended down T3 to 1.03  monitor on tele  c/w aspirin , statin through NG tube   no BB due to hypotension  pt had acute onset of Afib-given one dose amiodarone and converted back to sinus rhythm  f/u repeat echo  f/u cardio consult = Dr. Garrison.
89 female with hx of hyperthyroidism, anemia, presented with acute resp failure, RSV infection, H flu bacteremia, pneumonia, acute CHFrEF/severe cardiomyopathy, NSTEMI.    Attempting SBT today.  Will continue abx, diuresis, mechanical ventilation.  Now DNR/DNI, prognosis poor.  No family or HCP to make decisions.   Total CC time 35 min.
89 female with hx of hyperthyroidism, anemia, presented with acute resp failure, RSV infection, H flu bacteremia, pneumonia, acute CHFrEF/severe cardiomyopathy, NSTEMI.    Daily SBT  Will continue abx, diuresis, mechanical ventilation.  Now DNR/DNI, prognosis poor.  No family or HCP to make decisions.   Will try to perform thoracentesis to help liberate from vent.  Total CC time 35 min.
89 female with hx of hyperthyroidism, anemia, presented with acute resp failure, RSV infection, H flu bacteremia, pneumonia, acute CHFrEF/severe cardiomyopathy, NSTEMI.    Still failing SBT.  Will continue abx, diuresis, mechanical ventilation.  Now DNR/DNI, prognosis poor.  Total CC time 35 min
89 female with hx of hyperthyroidism, anemia, presented with acute resp failure, RSV infection, H flu bacteremia, pneumonia, acute CHFrEF/severe cardiomyopathy, NSTEMI.    Still failing SBTs, copious secretions.  Will continue abx, diuresis.  Total CC time 35 min.
Patient was intubated in the ED, tachycardic to 110-130's in the ED, BP improved to 156/72 after starting levophed,  No leucocytosis, but has a left shift, BMP shows hypernatremia to 151, elevated BUN to 45, lactate is 3.6. MCV elevated to 104.1.   ABG on s/p intubation on FiO2 of 100%, TV: 400, PEEP of 5 is pH: 7.22, PaCO2: 66, PaO2: 95, Bicarb: 26, O2 Sat of 100%  s/p 2L NS bolus and vanc and zosyn in the ED  CXR: Patchy right perihilar opacities, which may be due to pneumonia. Mild diffuse interstitial prominence. Small bilateral pleural effusions.  EKG - LVH , LAD , incomplete LBBB, QRS     Admitted to ICU for hypoxic and hypercapnia respiratory failure and septic shock requiring pressors 2/2 PNA found to be bacteremic with H. influenza           Problem/Plan - 1:  ·  Problem: Septic shock.  Plan: 2/2 RLL pneumonia, found to be bacteremic 2/2 H. influenzae (gram neg.)  no leucocytosis, afebrile overnight Tmax 100.2  c/w fentanyl, precedex , titrate for RASS   c/w phenylephrine , titrate for MAP of 65  lactate trended down to 1.7 on 11/30  RVP positive  legionella - negative, d/c'ed levaquin  BCx positive for gram negative coccobacillus - Haemophilus influenzae, c/w zosyn per ID recommendation, awaiting sensitivity  Ucx negative to date  CVP low 12/1, gave another 2-3 L bolus of lactate due to low CVP of 8- Improved  d/c LR, d/c'ed pressors.     Problem/Plan - 2:  ·  Problem: Respiratory failure.  Plan: hypoxic and hypercapnia respiratory failure 2/2 RLL pneumonia, found to be bacteremic with H. influenza, RVP positive  s/p intubation  c/w fentanyl 25mcg q4h   c/w precedex.     Problem/Plan - 3:  ·  Problem: EKG abnormalities.  Plan: EKG - LVH , LAD , incomplete LBBB, QRS   T1 T2 elevated at 1.5, trended down T3 to 1.03  monitor on tele  c/w aspirin , statin through NG tube   no BB due to hypotension  pt had acute onset of Afib-given one dose amiodarone and converted back to sinus rhythm  f/u repeat echo  f/u cardio consult = Dr. Garrison.      Problem/Plan - 4:  ·  Problem: Hypernatremia.  Plan: Na 147  s/p 1 bolus LR  c/w 250ml FW q6, c/w tube feeds.     Problem/Plan - 5:  ·  Problem: Prophylactic measure.  Plan: c/w GI ppx and DVT as ordered  IMPROVE - 2.       -taper off pressors  -change iv fentanyl to ivp fentanyl  -SAT//SBT once off presors and more awake
Patient was intubated in the ED, tachycardic to 110-130's in the ED, BP improved to 156/72 after starting levophed,  No leucocytosis, but has a left shift, BMP shows hypernatremia to 151, elevated BUN to 45, lactate is 3.6. MCV elevated to 104.1.   ABG on s/p intubation on FiO2 of 100%, TV: 400, PEEP of 5 is pH: 7.22, PaCO2: 66, PaO2: 95, Bicarb: 26, O2 Sat of 100%  s/p 2L NS bolus and vanc and zosyn in the ED  CXR: Patchy right perihilar opacities, which may be due to pneumonia. Mild diffuse interstitial prominence. Small bilateral pleural effusions.  EKG - LVH , LAD , incomplete LBBB, QRS     Admitted to ICU for hypoxic and hypercapnia respiratory failure and septic shock requiring pressors 2/2 PNA found to be bacteremic with H. influenza           Problem/Plan - 1:  ·  Problem: Septic shock.  Plan: 2/2 RLL pneumonia, likely gram negative organism due to likely aspiration  qsofa - 3/3   lactate - 3.9 ,tachycardic, no leucocytosis, afebrile overnight Tmax 100.2  c/w vanco + zosyn  c/w LR @ 100 cc  ABG on s/p intubation on FiO2 of 50%, TV: 400, PEEP of 5 is   pH: 7.38, PaCO2: 43, PaO2: 99, Bicarb: 24, O2 Sat of 98%  c/w fentanyl, precedex , titrate for RASS   c/w phenylephrine , titrate for MAP of 65  lactate increased to 7 yesterday AM, came down to 2.2 in PM, this AM lactate is 1.7  RVP positive  legionella - negative, d/c'ed levaquin  BCx positive for gram negative coccobacillus - Haemophilus influenzae, c/w vanc and zosyn  f/u UCx  CVP low, gave another bolus of lactate  titrating to CVP of 12-14.     Problem/Plan - 2:  ·  Problem: Respiratory failure.  Plan: hypoxic and hypercapnia respiratory failure 2/2 RLL pneumonia, found to be bacteremic with H. influenza, RVP positive  BG on s/p intubation on FiO2 of 100%, TV: 400, PEEP of 5 is   pH: 7.22, PaCO2: 66, PaO2: 95, Bicarb: 26, O2 Sat of 100%  c/w sedation , titrate for RASS score of -2  c/w levophed , titrate for MAP of 65.  -bolus ivf to cvp~12  -monitor urine out put
-pat is off pressors  -BP is low normal with cxr showing pul edema, therefore cannot rx lasix  -continue antibx for bacteremia, tapered to rocephin   -continue vent support and hemodynamic support
89 female with hx of hyperthyroidism, anemia, presented with acute resp failure, RSV infection, H flu bacteremia, pneumonia with parapneumonic effusion (thoracentesis on 12/8), acute CHFrEF/severe cardiomyopathy, NSTEMI, septic shock.  Very difficult to wean from vent.   Plan:  -Daily SBT  -Will continue abx, diuresis, mechanical ventilation.  Now DNR/DNI, prognosis poor.  No family or HCP to make decisions.   Total CC time 35 min.
89 female with hx of hyperthyroidism, anemia, presented with acute resp failure, RSV infection, H flu bacteremia, pneumonia with parapneumonic effusion (thoracentesis on 12/8), acute CHFrEF/severe cardiomyopathy, NSTEMI.    Daily SBT  Will continue abx, diuresis, mechanical ventilation.  Now DNR/DNI, prognosis poor.  No family or HCP to make decisions.   Total CC time 35 min.

## 2017-12-10 NOTE — PROGRESS NOTE ADULT - SUBJECTIVE AND OBJECTIVE BOX
INTERVAL /OVERNIGHT EVENTS: no events overnight, underwent thora yesterday in hopes of helping to extubate, failed sbt this morning     PRESSORS: [x ] YES [ ] NO  WHICH: levophed    ANTIBIOTICS: meropenem                 DATE STARTED: 12/7    Antimicrobial:  meropenem IVPB      meropenem IVPB 1000 milliGRAM(s) IV Intermittent every 8 hours    Cardiovascular:  furosemide   Injectable 40 milliGRAM(s) IV Push every 12 hours  norepinephrine Infusion 0.5 MICROgram(s)/kG/Min IV Continuous <Continuous>    Pulmonary: N/A    Hematalogic:  heparin  Injectable 5000 Unit(s) SubCutaneous every 8 hours    Other:  acetaminophen    Suspension 650 milliGRAM(s) Oral every 6 hours PRN  dexmedetomidine Infusion 0.2 MICROgram(s)/kG/Hr IV Continuous <Continuous>  fentaNYL    Injectable 100 MICROGram(s) IV Push every 2 hours PRN  fentaNYL   Infusion 3 MICROgram(s)/kG/Hr IV Continuous <Continuous>  pantoprazole   Suspension 40 milliGRAM(s) Oral daily  propylthiouracil 50 milliGRAM(s) Oral daily  QUEtiapine 25 milliGRAM(s) Oral at bedtime    acetaminophen    Suspension 650 milliGRAM(s) Oral every 6 hours PRN  dexmedetomidine Infusion 0.2 MICROgram(s)/kG/Hr IV Continuous <Continuous>  fentaNYL    Injectable 100 MICROGram(s) IV Push every 2 hours PRN  fentaNYL   Infusion 3 MICROgram(s)/kG/Hr IV Continuous <Continuous>  furosemide   Injectable 40 milliGRAM(s) IV Push every 12 hours  heparin  Injectable 5000 Unit(s) SubCutaneous every 8 hours  meropenem IVPB      meropenem IVPB 1000 milliGRAM(s) IV Intermittent every 8 hours  norepinephrine Infusion 0.5 MICROgram(s)/kG/Min IV Continuous <Continuous>  pantoprazole   Suspension 40 milliGRAM(s) Oral daily  propylthiouracil 50 milliGRAM(s) Oral daily  QUEtiapine 25 milliGRAM(s) Oral at bedtime    Drug Dosing Weight  Height (cm): 144.78 (28 Nov 2017 18:44)  Weight (kg): 46.7 (28 Nov 2017 18:44)  BMI (kg/m2): 22.3 (28 Nov 2017 18:44)  BSA (m2): 1.36 (28 Nov 2017 18:44)    CENTRAL LINE: [x ] YES [ ] NO  LOCATION: [facility]  DATE INSERTED: 11/29  REMOVE: [x ] YES [ ] NO  EXPLAIN: greater than 7 days of insertion    MORALES: [x ] YES [ ] NO    DATE INSERTED: 11/28  REMOVE:  [ ] YES [x ] NO  EXPLAIN: urine output monitoring          Mode: AC/ CMV (Assist Control/ Continuous Mandatory Ventilation)  RR (machine): 16  TV (machine): 400  FiO2: 40  ICU Vital Signs Last 24 Hrs  T(C): 37.6 (09 Dec 2017 17:22), Max: 38.2 (09 Dec 2017 11:00)  T(F): 99.6 (09 Dec 2017 17:22), Max: 100.7 (09 Dec 2017 11:00)  HR: 93 (09 Dec 2017 20:47) (63 - 133)  BP: 123/52 (09 Dec 2017 19:00) (83/65 - 148/70)  BP(mean): 68 (09 Dec 2017 19:00) (53 - 108)  ABP: --  ABP(mean): --  RR: 22 (09 Dec 2017 19:00) (16 - 33)  SpO2: 100% (09 Dec 2017 20:47) (87% - 100%)  PEEP: 5  ITime: 1  MAP: 13  PIP: 28      PHYSICAL EXAM:    GENERAL: sedated, intubated  HEAD: atraumatic, normocephalic   EYES: PERRLA, white sclera.   ENMT: nasal mucosa- Oral cavity- dry  NECK: supple, no JVD, thyroid- not palpable  LYMPH: no palpable lymph nodes     SKIN: warm, dry   CHEST/LUNG: ET tube: size        cm at lip. No Chest deformity , no chest tenderness. bilateral breath sounds,no  adventitious sounds  HEART: RRR, no m/r/g   ABDOMEN: soft, nontender, nondistended; bowel sounds+  :morales catheter.  EXTREMITIES:no-pitting edema, no cyanosis, no clubbing.  NEURO: sedated        LABS:  CBC Full  -  ( 08 Dec 2017 06:10 )  WBC Count : 32.6 K/uL  Hemoglobin : 10.1 g/dL  Hematocrit : 32.5 %  Platelet Count - Automated : 346 K/uL  Mean Cell Volume : 100.7 fl  Mean Cell Hemoglobin : 31.4 pg  Mean Cell Hemoglobin Concentration : 31.2 gm/dL  Auto Neutrophil # : x  Auto Lymphocyte # : x  Auto Monocyte # : x  Auto Eosinophil # : x  Auto Basophil # : x  Auto Neutrophil % : 93.0 %  Auto Lymphocyte % : 4.0 %  Auto Monocyte % : 3.0 %  Auto Eosinophil % : x  Auto Basophil % : x    12-08    143  |  104  |  7   ----------------------------<  88  3.4<L>   |  36<H>  |  <0.20<L>    Ca    7.9<L>      08 Dec 2017 06:10  Phos  1.8     12-08  Mg     2.2     12-08          Culture Results:   Normal Respiratory Jennifer present (12-08 @ 01:21)  Culture Results:   No growth to date. (12-07 @ 17:37)  Culture Results:   No growth to date. (12-07 @ 17:37)  Culture Results:   Normal Respiratory Jennifer present (12-07 @ 17:33)      RADIOLOGY & ADDITIONAL STUDIES REVIEWED:     [ ]GOALS OF CARE DISCUSSION WITH PATIENT/FAMILY/PROXY:    CRITICAL CARE TIME SPENT: 35 minutes

## 2017-12-10 NOTE — PROGRESS NOTE ADULT - PROBLEM SELECTOR PLAN 1
2/2 RLL pneumonia and bacteremic 2/2 H. influenzae, +RVP  leucocytosis persistent, c/w Abx, afebrile overnight  BCx grew H. influenza sensitive to Rocephin  Repeat BCx/Sputum 11/30 NGTD, repeat Sputum GPR/GNR  -c/w pressors and taper as tolerated

## 2017-12-10 NOTE — PROGRESS NOTE ADULT - ASSESSMENT
A 88 yo Female with right femur neck fx, difficulty walking,  brought in to ER by EMS from [facility] for  evaluation of  difficulty of breathing. In the ER, she found to be tachycardic, tachypneic up to 50's, hypotensive to SBP of 50's and lethargic and required intubation. She also has leukocytosis and chest xray shows right sided pneumonia. She has started on Levaquin, Vancomycin and zosyn, cultures pending. The ID consult requested to assist with further evaluation and antibiotic  management.       # Septic shock- off pressor  # Multifocal pneumonia  # RSV  # Bacteremia-  Haemophilus influenzae  # Repeat Blood culture- No growth to date 11/30/17  # Biliary ductal dilatation   # B/L Moderate pleural effusion- s/p Right thoracentesis 12/8/17      Would recommend:  1. Aspiration precaution and continue bronchial suctioning  2. Monitor WBC count, is trending  down  3. Continue  Meropenem 1 g Q 8hours in the setting of biliary ductal dilatation  4. Supportive care for RSV and Isolation as per Hospital policy        d/w ICU team    will follow the patient with you

## 2017-12-10 NOTE — PROGRESS NOTE ADULT - PROBLEM SELECTOR PLAN 5
Prior TTE 11/8 moderate pulmonary HTN and normal EF  Repeat TTE shows severe segmental systolic HF w/ EF 25  On IV lasix Q12

## 2017-12-10 NOTE — PROGRESS NOTE ADULT - PROBLEM SELECTOR PROBLEM 1
Acute respiratory failure with hypoxia
Septic shock

## 2017-12-11 NOTE — PROGRESS NOTE ADULT - SUBJECTIVE AND OBJECTIVE BOX
· Patient Death Criteria	Patient is dead based on Cardiopulmonary criteria including absent breath sounds, pulselessness and/or asystole	     Death (Provider only):  Patient condition: .     Preliminary cause of death: Sepsis.     Chief Complaint/Reason for Visit:  Chief Complaint/Reason for Admission	difficulty breathing	     Hospital Course:  · Hospital Course		  89 F from [facility] Right Femur Neck Fracture, DONELL, Hyperthyroidism (PTU), Constipation p/w difficulty breathing - admitted to ICU for hypoxic and hypercapnia respiratory failure and septic shock requiring pressors 2/2 PNA and bacteremic with H. influenza vs  Acute Systolic CHF EF 25%.  On admission, pt EKG - LVH, LAD, incomplete LBBB, QRS /CE elevated to 1.5 then dropped to 1.0 Acute onset of Afib; given one dose amiodarone and converted back to sinus rhythm. Pt continued on aspirin and statin. Pt found to have RLL pneumonia with persistent leukocytosis for many days. Pt BCx grew H. influenza sensitive to Rocephin. Repeat BCx/Sputum on  had no growth to date. Repeat sputum culture showed gram positive and gram negative rods. Pt was continued on pressors. Pt underwent thoracentesis which revealed 850ml of blood tinged/cloudy fluid drained, likely parapneumonic effusion, results pending. Patient switched from Rocephin to Meropenem 3 days prior due to concern for abdominal source of infection given worsening/persistent leukocytosis. Pt was intubated on  and extubated on 12/10. Pt has LIJ in place, placed on  for pressure support.  Prior TTE  moderate pulmonary HTN and normal EF. Repeat TTE shows severe segmental systolic HF w/ EF 25 On IV lasix Q12. Pt made DNR/DNI via 2 PC consent. Pt has no next of kin. Pt was maintained on pressure support despite extubation, however went into asystole on 7:33 am. Organ donation called.

## 2017-12-11 NOTE — DISCHARGE NOTE FOR THE EXPIRED PATIENT - HOSPITAL COURSE
89 F from [facility] Right Femur Neck Fracture, DONELL, Hyperthyroidism (PTU), Constipation p/w difficulty breathing - admitted to ICU for hypoxic and hypercapnia respiratory failure and septic shock requiring pressors 2/2 PNA and bacteremic with H. influenza vs  Acute Systolic CHF EF 25%.  On admission, pt EKG - LVH, LAD, incomplete LBBB, QRS /CE elevated to 1.5 then dropped to 1.0 Acute onset of Afib; given one dose amiodarone and converted back to sinus rhythm  - C/w ASA and Atorvastatin  Cardiology Dr. Garrison.   Pt has persistent leukocytosis however afebrile for many days. Pt BCx grew H. influenza sensitive to Rocephin. Repeat BCx/Sputum on 11/30 had no growth to date. Repeat sputum culture showed gram positive and gram negative rods. Pt was continued on pressors. Pt underwent thoracentesis which revealed 850ml of blood tinged/cloudy fluid drained, likely parapneumonic effusion, results pending. Patient switched from Rocephin to Meropenem 3 days prior due to concern for abdominal source of infection given worsening/persistent leukocytosis. Pt was intubated on 11/28 and extubated on 12/10. Pt has LIJ in place, placed on 11/29 for pressure support.  Prior TTE 11/8 moderate pulmonary HTN and normal EF  Repeat TTE shows severe segmental systolic HF w/ EF 25  On IV lasix Q12.      Problem/Plan - 6:  Problem: EKG abnormalities. Plan:   Problem/Plan - 7:  ·  Problem: Prophylactic measure.  Plan: IMPROVE score 2 2/2 age and immobilization - DVT PPx w/ HSQ  - GI PPx w/ Protonix.     Attending Attestation:   89 female with hx of hyperthyroidism, anemia, presented with acute resp failure, RSV infection, H flu bacteremia, pneumonia with parapneumonic effusion (thoracentesis on 12/8), acute CHFrEF/severe cardiomyopathy, NSTEMI, septic shock.  Very difficult to wean from vent.   Plan:  -Daily SBT  -Will continue abx, diuresis, mechanical ventilation.  Now DNR/DNI, prognosis poor.  No family or HCP to make decisions.   Total CC time 35 min.      I was physically present for the key portions of the evaluation and management (E/M) service provided.  I agree with the above history, physical, and plan which I have reviewed and edited where appropriate. 89 F from [facility] Right Femur Neck Fracture, DONELL, Hyperthyroidism (PTU), Constipation p/w difficulty breathing - admitted to ICU for hypoxic and hypercapnia respiratory failure and septic shock requiring pressors 2/2 PNA and bacteremic with H. influenza vs  Acute Systolic CHF EF 25%.  On admission, pt EKG - LVH, LAD, incomplete LBBB, QRS /CE elevated to 1.5 then dropped to 1.0 Acute onset of Afib; given one dose amiodarone and converted back to sinus rhythm. Pt continued on aspirin and statin. Pt found to have RLL pneumonia with persistent leukocytosis for many days. Pt BCx grew H. influenza sensitive to Rocephin. Repeat BCx/Sputum on 11/30 had no growth to date. Repeat sputum culture showed gram positive and gram negative rods. Pt was continued on pressors. Pt underwent thoracentesis which revealed 850ml of blood tinged/cloudy fluid drained, likely parapneumonic effusion, results pending. Patient switched from Rocephin to Meropenem 3 days prior due to concern for abdominal source of infection given worsening/persistent leukocytosis. Pt was intubated on 11/28 and extubated on 12/10. Pt has LIJ in place, placed on 11/29 for pressure support.  Prior TTE 11/8 moderate pulmonary HTN and normal EF. Repeat TTE shows severe segmental systolic HF w/ EF 25 On IV lasix Q12. Pt made DNR/DNI via 2 PC consent. Pt has no next of kin. Pt was maintained on pressure support despite extubation, however went into asystole on 7:33 am. Organ donation called. 89 F from [facility] Right Femur Neck Fracture, DONELL, Hyperthyroidism (PTU), Constipation p/w difficulty breathing - admitted to ICU for hypoxic and hypercapnia respiratory failure and septic shock requiring pressors 2/2 PNA and bacteremic with H. influenza vs  Acute Systolic CHF EF 25%.  On admission, pt EKG - LVH, LAD, incomplete LBBB, QRS /CE elevated to 1.5 then dropped to 1.0 Acute onset of Afib; given one dose amiodarone and converted back to sinus rhythm. Pt continued on aspirin and statin. Pt found to have RLL pneumonia with persistent leukocytosis for many days. Pt BCx grew H. influenza sensitive to Rocephin. Repeat BCx/Sputum on 11/30 had no growth to date. Repeat sputum culture showed gram positive and gram negative rods. Pt was continued on pressors. Pt underwent thoracentesis which revealed 850ml of blood tinged/cloudy fluid drained, likely parapneumonic effusion, results pending. Patient switched from Rocephin to Meropenem 3 days prior due to concern for abdominal source of infection given worsening/persistent leukocytosis. Pt was intubated on 11/28 and extubated on 12/10. Pt has LIJ in place, placed on 11/29 for pressure support.  Prior TTE 11/8 moderate pulmonary HTN and normal EF. Repeat TTE shows severe segmental systolic HF w/ EF 25 On IV lasix Q12. Pt made DNR/DNI via 2 PC consent. Pt has no next of kin. Pt was maintained on pressure support despite extubation, however went into asystole on 7:33 am. Organ donation called.     for a full account of hospital course please refer to actual medical records for this is a brief summery 89 F from [facility] Right Femur Neck Fracture, DONELL, Hyperthyroidism (PTU), Constipation p/w difficulty breathing - admitted to ICU for hypoxic and hypercapnia respiratory failure and septic shock requiring pressors 2/2 PNA and bacteremic with H. influenza vs  Acute Systolic CHF EF 25%.  On admission, pt EKG - LVH, LAD, incomplete LBBB, QRS /CE elevated to 1.5 then dropped to 1.0 Acute onset of Afib; given one dose amiodarone and converted back to sinus rhythm. Pt continued on aspirin and statin. Pt found to have RLL pneumonia with persistent leukocytosis for many days. Pt BCx grew H. influenza sensitive to Rocephin. Repeat BCx/Sputum on 11/30 had no growth to date. Repeat sputum culture showed gram positive and gram negative rods. Pt was continued on pressors. Pt underwent thoracentesis which revealed 850ml of blood tinged/cloudy fluid drained, likely parapneumonic effusion, results pending. Patient switched from Rocephin to Meropenem 3 days prior due to concern for abdominal source of infection given worsening/persistent leukocytosis. Pt was intubated on 11/28 and extubated on 12/10. Pt has LIJ in place, placed on 11/29 for pressure support.  Prior TTE 11/8 moderate pulmonary HTN and normal EF. Repeat TTE shows severe segmental systolic HF w/ EF 25 On IV lasix Q12. Pt made DNR/DNI via 2 PC consent. Pt has no next of kin. Pt was maintained on pressure support despite extubation, however went into asystole on 7:33 am. Organ donation called, stated they will be donating organs for scientific purposes, therefore not autopsy was performed.     for a full account of hospital course please refer to actual medical records for this is a brief summery

## 2017-12-12 LAB
CULTURE RESULTS: SIGNIFICANT CHANGE UP
CULTURE RESULTS: SIGNIFICANT CHANGE UP
SPECIMEN SOURCE: SIGNIFICANT CHANGE UP
SPECIMEN SOURCE: SIGNIFICANT CHANGE UP

## 2017-12-14 LAB
CULTURE RESULTS: SIGNIFICANT CHANGE UP
SPECIMEN SOURCE: SIGNIFICANT CHANGE UP

## 2017-12-19 PROCEDURE — 86901 BLOOD TYPING SEROLOGIC RH(D): CPT

## 2017-12-19 PROCEDURE — 93005 ELECTROCARDIOGRAM TRACING: CPT

## 2017-12-19 PROCEDURE — 85027 COMPLETE CBC AUTOMATED: CPT

## 2017-12-19 PROCEDURE — 73700 CT LOWER EXTREMITY W/O DYE: CPT

## 2017-12-19 PROCEDURE — 80053 COMPREHEN METABOLIC PANEL: CPT

## 2017-12-19 PROCEDURE — 83690 ASSAY OF LIPASE: CPT

## 2017-12-19 PROCEDURE — 76000 FLUOROSCOPY <1 HR PHYS/QHP: CPT

## 2017-12-19 PROCEDURE — 80061 LIPID PANEL: CPT

## 2017-12-19 PROCEDURE — 84439 ASSAY OF FREE THYROXINE: CPT

## 2017-12-19 PROCEDURE — 86140 C-REACTIVE PROTEIN: CPT

## 2017-12-19 PROCEDURE — 82550 ASSAY OF CK (CPK): CPT

## 2017-12-19 PROCEDURE — 84484 ASSAY OF TROPONIN QUANT: CPT

## 2017-12-19 PROCEDURE — 97530 THERAPEUTIC ACTIVITIES: CPT

## 2017-12-19 PROCEDURE — 97116 GAIT TRAINING THERAPY: CPT

## 2017-12-19 PROCEDURE — 80048 BASIC METABOLIC PNL TOTAL CA: CPT

## 2017-12-19 PROCEDURE — 97110 THERAPEUTIC EXERCISES: CPT

## 2017-12-19 PROCEDURE — 83036 HEMOGLOBIN GLYCOSYLATED A1C: CPT

## 2017-12-19 PROCEDURE — 73502 X-RAY EXAM HIP UNI 2-3 VIEWS: CPT

## 2017-12-19 PROCEDURE — C1713: CPT

## 2017-12-19 PROCEDURE — 86900 BLOOD TYPING SEROLOGIC ABO: CPT

## 2017-12-19 PROCEDURE — 84443 ASSAY THYROID STIM HORMONE: CPT

## 2017-12-19 PROCEDURE — 99285 EMERGENCY DEPT VISIT HI MDM: CPT | Mod: 25

## 2017-12-19 PROCEDURE — 93306 TTE W/DOPPLER COMPLETE: CPT

## 2017-12-19 PROCEDURE — P9040: CPT

## 2017-12-19 PROCEDURE — 80076 HEPATIC FUNCTION PANEL: CPT

## 2017-12-19 PROCEDURE — 36430 TRANSFUSION BLD/BLD COMPNT: CPT

## 2017-12-19 PROCEDURE — 85730 THROMBOPLASTIN TIME PARTIAL: CPT

## 2017-12-19 PROCEDURE — 85652 RBC SED RATE AUTOMATED: CPT

## 2017-12-19 PROCEDURE — 84481 FREE ASSAY (FT-3): CPT

## 2017-12-19 PROCEDURE — 83735 ASSAY OF MAGNESIUM: CPT

## 2017-12-19 PROCEDURE — 84100 ASSAY OF PHOSPHORUS: CPT

## 2017-12-19 PROCEDURE — 82553 CREATINE MB FRACTION: CPT

## 2017-12-19 PROCEDURE — 82962 GLUCOSE BLOOD TEST: CPT

## 2017-12-19 PROCEDURE — 82607 VITAMIN B-12: CPT

## 2017-12-19 PROCEDURE — 82746 ASSAY OF FOLIC ACID SERUM: CPT

## 2017-12-19 PROCEDURE — 86850 RBC ANTIBODY SCREEN: CPT

## 2017-12-19 PROCEDURE — 71045 X-RAY EXAM CHEST 1 VIEW: CPT

## 2017-12-19 PROCEDURE — 86920 COMPATIBILITY TEST SPIN: CPT

## 2017-12-19 PROCEDURE — C1769: CPT

## 2017-12-19 PROCEDURE — 85610 PROTHROMBIN TIME: CPT

## 2018-03-16 NOTE — PROGRESS NOTE ADULT - SUBJECTIVE AND OBJECTIVE BOX
Patient is seen and examined at the bed side, is afebrile.  She remains intubated and  off pressor. The Leukocytosis is trending down.          REVIEW OF SYSTEMS: Unable to obtain since intubated/sedated            ICU Vital Signs Last 24 Hrs  T(C): 37.1 (04 Dec 2017 15:20), Max: 37.4 (03 Dec 2017 23:53)  T(F): 98.7 (04 Dec 2017 15:20), Max: 99.3 (03 Dec 2017 23:53)  HR: 84 (04 Dec 2017 18:00) (58 - 103)  BP: 123/50 (04 Dec 2017 18:00) (99/39 - 151/89)  BP(mean): 67 (04 Dec 2017 18:00) (50 - 102)  ABP: --  ABP(mean): --  RR: 18 (04 Dec 2017 18:00) (14 - 28)  SpO2: 95% (04 Dec 2017 18:00) (94% - 99%)            PHYSICAL EXAM:    GENERAL: Intubated/sedated  HEENT: ET tube in placed  CVS: s1 and s2 present  RESP: Air entry B/L   GI: abdomen distended  EXT: No pedal  edema, Right hip staples in placed, site looks clean, no  redness  CNS: intubated/sedated              ALLERGIES: NKDA                LABS:                        9.6    20.8  )-----------( 136      ( 04 Dec 2017 06:15 )             31.4                             10.5   25.8  )-----------( 128      ( 03 Dec 2017 07:07 )             33.0                           10.6   24.2  )-----------( 140      ( 02 Dec 2017 07:36 )             34.8                             11.3   22.5  )-----------( 167      ( 01 Dec 2017 06:38 )             36.8           12-04    144  |  107  |  11  ----------------------------<  99  4.0   |  31  |  0.20<L>    Ca    8.2<L>      04 Dec 2017 06:15  Phos  2.1     12-04  Mg     2.0     12-04    TPro  5.3<L>  /  Alb  2.6<L>  /  TBili  0.6  /  DBili  x   /  AST  24  /  ALT  24  /  AlkPhos  65  12-04          PT/INR - ( 2017 11:02 )   PT: 17.7 sec;   INR: 1.61 ratio         PTT - ( 2017 11:02 )  PTT:41.2 sec  Urinalysis Basic - ( 2017 16:48 )    Color: Yellow / Appearance: Slightly Turbid / S.020 / pH: x  Gluc: x / Ketone: Small  / Bili: Negative / Urobili: 4   Blood: x / Protein: 100 / Nitrite: Negative   Leuk Esterase: Negative / RBC: 5-10 /HPF / WBC 3-5 /HPF   Sq Epi: x / Non Sq Epi: Moderate /HPF / Bacteria: Many /HPF              ABG - ( 2017 05:30 )  pH: 7.38  /  pCO2: 43    /  pO2: 99    / HCO3: 24    / Base Excess: -0.3  /  SaO2: 98                MEDICATIONS  (STANDING):  ascorbic acid 500 milliGRAM(s) Oral daily  aspirin  chewable 81 milliGRAM(s) Oral daily  atorvastatin 40 milliGRAM(s) Oral at bedtime  cefTRIAXone   IVPB 2 Gram(s) IV Intermittent every 24 hours  cefTRIAXone   IVPB      dexmedetomidine Infusion 0.2 MICROgram(s)/kG/Hr (3 mL/Hr) IV Continuous <Continuous>  ergocalciferol 49176 Unit(s) Oral <User Schedule>  ferrous    sulfate Liquid 300 milliGRAM(s) Enteral Tube daily  folic acid 1 milliGRAM(s) Oral daily  heparin  Injectable 5000 Unit(s) SubCutaneous every 8 hours  multivitamin 1 Tablet(s) Oral daily  pantoprazole   Suspension 40 milliGRAM(s) Oral daily  propylthiouracil 50 milliGRAM(s) Oral daily    MEDICATIONS  (PRN):  acetaminophen  Suppository 650 milliGRAM(s) Rectal every 6 hours PRN For Temp greater than 38 C (100.4 F)  HYDROmorphone  Injectable 1 milliGRAM(s) IV Push every 4 hours PRN Severe Pain (7 - 10)                    RADIOLOGY & ADDITIONAL TESTS:      17 : Xray Chest 1 View AP -PORTABLE-Routine (17 @ 09:34) : No change in small bilateral pleural effusions and right perihilar airspace disease.        17 : Xray Chest 1 View AP- PORTABLE-Urgent (17 @ 11:51) : Enteric tube has been advanced, below the left hemidiaphragm, tip collimated off the field-of-view. ET tube and left IJ catheter remain in  place. No pneumothorax. Persistent right midlung opacity. Mild diffuse interstitial prominence.  Small bilateral pleural effusions.          17 : Xray Chest 1 View AP/PA (17 @ 05:48) : Increased lung volumes consistent with COPD. Bones are again quite  demineralized. Endotracheal tube is in good position. Nasogastric tube courses into the abdominal area off the lower edge of the film. Heart is grossly normal in size.  Significant infiltrates are seen again bilaterally but most pronounced in the right perihilar area.      17 : Xray Chest 1 View AP -PORTABLE-Routine (17 @ 16:28) : Patchy right perihilar opacities, which may be due to pneumonia. Mild  diffuse interstitial prominence. Small bilateral pleural effusions.            MICROBIOLOGY DATA:  Culture - Blood (17 @ 00:47)    -  Haemophilus influenzae: Detec    Gram Stain:   Growth in anaerobic bottle:  Gram Negative Coccobacilli    -  Ampicillin: S    -  Ceftriaxone: S    -  Chloramphenicol: S    -  Levofloxacin: S    -  Meropenem: S    Specimen Source: .Blood Blood-Venous    Organism: Blood Culture PCR    Organism: Haemophilus influenzae    Culture Results:   Growth in anaerobic bottle: Haemophilus influenzae  ***Blood Panel PCR results on this specimen are available  approximately 3 hours after the Gram stain result.***  Gram stain, PCR, and/or culture results may not always  correspond due to difference in methodologies.  ************************************************************  This PCR assay was performed using [employer].  The following targets are tested for: Enterococcus,  vancomycin resistant enterococci, Listeria monocytogenes,  coagulase negative staphylococci, S. aureus,  methicillin resistant S. aureus, Streptococcus agalactiae  (Group B), S. pneumoniae, S. pyogenes (Group A),  Acinetobacter baumannii, Enterobacter cloacae, E. coli,  Klebsiella oxytoca, K. pneumoniae, Proteus sp.,  Serratia marcescens, Haemophilus influenzae,  Neisseria meningitidis, Pseudomonas aeruginosa, Candida  albicans, C. glabrata, C krusei, C parapsilosis,  C. tropicalis and the KPC resistance gene.    Organism Identification: Blood Culture PCR  Haemophilus influenzae    Method Type: PCR    Method Type: KB          Culture - Sputum . (17 @ 11:55)    Gram Stain:   Moderate polymorphonuclear leukocytes per low power field  Rare Squamous epithelial cells per low power field  Rare Gram Positive Cocci per oil power field    Specimen Source: .Sputum Sputum - trap    Culture Results:   No growth      Culture - Blood (17 @ 10:13)    Specimen Source: .Blood Blood-Peripheral    Culture Results:   No growth to date.    Culture - Blood (17 @ 10:13)    Specimen Source: .Blood Blood-Peripheral    Culture Results:   No growth to date.        Legionella pneumophila Antigen, Urine (17 @ 17:17)    Legionella Antigen, Urine: Negative            Culture - Blood (17 @ 00:47)    Specimen Source: .Blood Blood-Peripheral    Culture Results:   No growth to date.      Culture - Urine (17 @ 00:19)    Specimen Source: .Urine Catheterized    Culture Results:   No growth            Rapid Respiratory Viral Panel (17 @ 19:15)    Rapid RVP Result: Detected: The [employer] RVP Rapid uses polymerase chain reaction (PCR) and melt  curve analysis to screen for adenovirus; coronavirus HKU1, NL63, 229E,  OC43; human metapneumovirus (hMPV); human enterovirus/rhinovirus  (Entero/RV); influenza A; influenza A/H1;influenza A/H3; influenza  A/H1-2009; influenza B; parainfluenza viruses 1, 2, 3, 4; respiratory  syncytial virus; Bordetella pertussis; Mycoplasma pneumoniae; and  Chlamydophila pneumoniae. .

## 2019-03-21 NOTE — PHYSICAL THERAPY INITIAL EVALUATION ADULT - GENERAL OBSERVATIONS, REHAB EVAL
Consult received, EMR, radiology and labs reviewed. Patient received supine in bed, intubated, sedated . Patient agreed to EVALUATION from Physical Therapist. Statement Selected

## 2020-08-24 NOTE — ED ADULT TRIAGE NOTE - SOURCE OF INFORMATION
Called and informed pt that her pap smear is negative, but indicates she has bv. Flagyl 500 mg bid and Difluan sent to pharmacy. EMS

## 2023-12-10 NOTE — ED PROVIDER NOTE - NS ED MD EM SELECTION
[id number] Comprehensive
Normal vision: sees adequately in most situations; can see medication labels, newsprint

## 2025-01-16 NOTE — DISCHARGE NOTE ADULT - NS AS DC STROKE DX YN
1130 Report called to Suzan Masters. Pt scheduled for 1230 pickup    Electronically signed by Charlene May RN on 1/16/2025 at 11:38 AM       no
